# Patient Record
Sex: FEMALE | Race: ASIAN | NOT HISPANIC OR LATINO | Employment: UNEMPLOYED | URBAN - METROPOLITAN AREA
[De-identification: names, ages, dates, MRNs, and addresses within clinical notes are randomized per-mention and may not be internally consistent; named-entity substitution may affect disease eponyms.]

---

## 2017-07-28 ENCOUNTER — HOSPITAL ENCOUNTER (OUTPATIENT)
Dept: MAMMOGRAPHY | Facility: HOSPITAL | Age: 72
Discharge: HOME | End: 2017-07-28

## 2017-07-28 DIAGNOSIS — Z12.31 VISIT FOR SCREENING MAMMOGRAM: ICD-10-CM

## 2017-10-11 ENCOUNTER — COMMUNICATION - HEALTHEAST (OUTPATIENT)
Dept: FAMILY MEDICINE | Facility: CLINIC | Age: 72
End: 2017-10-11

## 2017-11-01 ENCOUNTER — OFFICE VISIT - HEALTHEAST (OUTPATIENT)
Dept: FAMILY MEDICINE | Facility: CLINIC | Age: 72
End: 2017-11-01

## 2017-11-01 DIAGNOSIS — Z23 IMMUNIZATION DUE: ICD-10-CM

## 2017-11-01 DIAGNOSIS — Z23 NEED FOR INFLUENZA VACCINATION: ICD-10-CM

## 2017-11-01 ASSESSMENT — MIFFLIN-ST. JEOR: SCORE: 957.13

## 2021-05-30 ENCOUNTER — RECORDS - HEALTHEAST (OUTPATIENT)
Dept: ADMINISTRATIVE | Facility: CLINIC | Age: 76
End: 2021-05-30

## 2021-05-31 ENCOUNTER — RECORDS - HEALTHEAST (OUTPATIENT)
Dept: ADMINISTRATIVE | Facility: CLINIC | Age: 76
End: 2021-05-31

## 2021-05-31 VITALS — BODY MASS INDEX: 28.33 KG/M2 | HEIGHT: 57 IN | WEIGHT: 131.31 LBS

## 2021-07-03 NOTE — ADDENDUM NOTE
Addendum Note by Belgica Whiteside CMA at 11/1/2017  5:56 PM     Author: Belgica Whiteside CMA Service: -- Author Type: Certified Medical Assistant    Filed: 11/1/2017  5:56 PM Encounter Date: 11/1/2017 Status: Signed    : Belgica Whiteside CMA (Certified Medical Assistant)    Addended by: BELGICA WHITESIDE on: 11/1/2017 05:56 PM        Modules accepted: Orders

## 2021-07-21 ENCOUNTER — RECORDS - HEALTHEAST (OUTPATIENT)
Dept: ADMINISTRATIVE | Facility: CLINIC | Age: 76
End: 2021-07-21

## 2023-12-19 ENCOUNTER — OFFICE VISIT (OUTPATIENT)
Dept: FAMILY MEDICINE | Facility: CLINIC | Age: 78
End: 2023-12-19
Payer: COMMERCIAL

## 2023-12-19 ENCOUNTER — TELEPHONE (OUTPATIENT)
Dept: FAMILY MEDICINE | Facility: CLINIC | Age: 78
End: 2023-12-19

## 2023-12-19 VITALS
RESPIRATION RATE: 21 BRPM | HEIGHT: 59 IN | WEIGHT: 115 LBS | DIASTOLIC BLOOD PRESSURE: 81 MMHG | HEART RATE: 85 BPM | OXYGEN SATURATION: 96 % | SYSTOLIC BLOOD PRESSURE: 129 MMHG | TEMPERATURE: 98.3 F | BODY MASS INDEX: 23.18 KG/M2

## 2023-12-19 DIAGNOSIS — E78.5 HYPERLIPIDEMIA, UNSPECIFIED HYPERLIPIDEMIA TYPE: ICD-10-CM

## 2023-12-19 DIAGNOSIS — E11.69 TYPE 2 DIABETES MELLITUS WITH OTHER SPECIFIED COMPLICATION, WITHOUT LONG-TERM CURRENT USE OF INSULIN (H): Primary | ICD-10-CM

## 2023-12-19 DIAGNOSIS — H04.123 DRY EYES: ICD-10-CM

## 2023-12-19 DIAGNOSIS — Z23 INFLUENZA VACCINATION ADMINISTERED AT CURRENT VISIT: ICD-10-CM

## 2023-12-19 LAB
ALBUMIN SERPL BCG-MCNC: 4.2 G/DL (ref 3.5–5.2)
ALP SERPL-CCNC: 144 U/L (ref 40–150)
ALT SERPL W P-5'-P-CCNC: 19 U/L (ref 0–50)
ANION GAP SERPL CALCULATED.3IONS-SCNC: 13 MMOL/L (ref 7–15)
AST SERPL W P-5'-P-CCNC: 18 U/L (ref 0–45)
BILIRUB SERPL-MCNC: 0.4 MG/DL
BUN SERPL-MCNC: 16.7 MG/DL (ref 8–23)
CALCIUM SERPL-MCNC: 9.4 MG/DL (ref 8.8–10.2)
CHLORIDE SERPL-SCNC: 96 MMOL/L (ref 98–107)
CHOLEST SERPL-MCNC: 334 MG/DL
CREAT SERPL-MCNC: 0.79 MG/DL (ref 0.51–0.95)
CREAT UR-MCNC: 22.2 MG/DL
DEPRECATED HCO3 PLAS-SCNC: 23 MMOL/L (ref 22–29)
EGFRCR SERPLBLD CKD-EPI 2021: 75 ML/MIN/1.73M2
ERYTHROCYTE [DISTWIDTH] IN BLOOD BY AUTOMATED COUNT: 12 % (ref 10–15)
FASTING STATUS PATIENT QL REPORTED: NO
GLUCOSE SERPL-MCNC: 645 MG/DL (ref 70–99)
HCT VFR BLD AUTO: 41.5 % (ref 35–47)
HDLC SERPL-MCNC: 43 MG/DL
HGB BLD-MCNC: 13.5 G/DL (ref 11.7–15.7)
LDLC SERPL CALC-MCNC: 215 MG/DL
MCH RBC QN AUTO: 28.7 PG (ref 26.5–33)
MCHC RBC AUTO-ENTMCNC: 32.5 G/DL (ref 31.5–36.5)
MCV RBC AUTO: 88 FL (ref 78–100)
MICROALBUMIN UR-MCNC: <12 MG/L
MICROALBUMIN/CREAT UR: NORMAL MG/G{CREAT}
NONHDLC SERPL-MCNC: 291 MG/DL
PLATELET # BLD AUTO: 224 10E3/UL (ref 150–450)
POTASSIUM SERPL-SCNC: 4.1 MMOL/L (ref 3.4–5.3)
PROT SERPL-MCNC: 7.9 G/DL (ref 6.4–8.3)
RBC # BLD AUTO: 4.7 10E6/UL (ref 3.8–5.2)
SODIUM SERPL-SCNC: 132 MMOL/L (ref 135–145)
TRIGL SERPL-MCNC: 381 MG/DL
WBC # BLD AUTO: 6 10E3/UL (ref 4–11)

## 2023-12-19 PROCEDURE — 82570 ASSAY OF URINE CREATININE: CPT

## 2023-12-19 PROCEDURE — 90662 IIV NO PRSV INCREASED AG IM: CPT

## 2023-12-19 PROCEDURE — 80061 LIPID PANEL: CPT

## 2023-12-19 PROCEDURE — 36415 COLL VENOUS BLD VENIPUNCTURE: CPT

## 2023-12-19 PROCEDURE — 99207 PR FOOT EXAM NO CHARGE: CPT

## 2023-12-19 PROCEDURE — 82043 UR ALBUMIN QUANTITATIVE: CPT

## 2023-12-19 PROCEDURE — 85027 COMPLETE CBC AUTOMATED: CPT

## 2023-12-19 PROCEDURE — 80053 COMPREHEN METABOLIC PANEL: CPT

## 2023-12-19 PROCEDURE — 99204 OFFICE O/P NEW MOD 45 MIN: CPT | Mod: 25

## 2023-12-19 PROCEDURE — 83036 HEMOGLOBIN GLYCOSYLATED A1C: CPT

## 2023-12-19 PROCEDURE — G0008 ADMIN INFLUENZA VIRUS VAC: HCPCS

## 2023-12-19 RX ORDER — GLIPIZIDE 10 MG/1
10 TABLET, FILM COATED, EXTENDED RELEASE ORAL DAILY
COMMUNITY
Start: 2023-03-09 | End: 2023-12-27

## 2023-12-19 RX ORDER — ATORVASTATIN CALCIUM 20 MG/1
20 TABLET, FILM COATED ORAL DAILY
COMMUNITY
Start: 2023-02-02 | End: 2024-03-04

## 2023-12-19 RX ORDER — GABAPENTIN 100 MG/1
100 CAPSULE ORAL
COMMUNITY
Start: 2023-03-09 | End: 2023-12-27

## 2023-12-19 NOTE — PROGRESS NOTES
Preceptor Attestation:   Patient seen, evaluated and discussed with the resident. I have verified the content of the note, which accurately reflects my assessment of the patient and the plan of care.  Supervising Physician:Maryellen Benedict MD  [facility]

## 2023-12-19 NOTE — PROGRESS NOTES
Assessment & Plan     Type 2 diabetes mellitus with other specified complication, without long-term current use of insulin (H)  Unknown controlled.  Has a blood glucose monitor with her, but states it has not been working properly.  Initially, she was checking her blood sugars twice a day, but again due to her monitor not working has not been of late.  Patient is not having episodes of hypoglycemia.     Medications before this visit: Patient unsure of which medication she is taking, but thinks her current regimen is Metformin 1000 BID, Jardiance 10 mg, Glipizide 10 mg  Medications after this visit: Will continue current regimen.  Will obtain lab work today, and request records from prior primary care.  Discussed that patient should bring all of her medications at follow-up visit, so we can document appropriately.     Diabetes Measures:  A1c <8.0, checked in last year: Unknown last A1c.  Last A1c documented in our chart is from 2014.  Patient's daughter reports she thinks that A1c was checked about 3 months ago.  Statin if indicated: Atorvastatin 20 mg  BP <140/90: At goal today.  On ASA if ischemic vascular disease: no  Tobacco use: no   UTD on annual eye exam: yes, reports seeing eye doctor yearly     - OR FOOT EXAM NO CHARGE  - AMB Adult Diabetes Educator Referral; Future  - Hemoglobin A1c  - Albumin Random Urine Quantitative with Creat Ratio  - CBC with platelets  - Comprehensive metabolic panel    Difficult to obtain clear history from patient, she was unsure of what medication she is taking or when last lab work was done. Will obtain screening lab work today.  In addition, had patient fill out TEMO to get information from prior primary care provider.  Advised to continue taking medications as prescribed for now until we have further information.   - follow up with in 2 weeks      Hyperlipidemia, unspecified hyperlipidemia type  Patient reports history of hyperlipidemia currently on atorvastatin 20 mg.  Reports  "taking this medication without any adverse effects.  Unknown last lab testing.  Will check lipids today.  - Lipid panel reflex to direct LDL Non-fasting    Influenza vaccination administered at current visit  Due for flu shot today.  - INFLUENZA VACCINE 65+ (FLUZONE HD)    Dry eyes  Requesting refill today.   - hypromellose-dextran (ARTIFICAL TEARS) 0.1-0.3 % ophthalmic solution; Place 1-2 drops into both eyes daily as needed for dry eyes       Return in about 2 weeks (around 1/2/2024) for Follow up labs+DM.    Joellen Meeks DO [facility]      Rancho Taylor is a 78 year old, presenting for the following health issues:  Diabetes and Imm/Inj (Flu shot)        12/19/2023     9:42 AM   Additional Questions   Roomed by Lashanda   Accompanied by daughter       HPI   78 year old presents with daughter today to establish care at our clinic.  She reports that she was previously seen by a provider, [facility].     She is here today for follow-up on her diabetes.  She is unsure of her last A1c.  She is unsure of what medication she is taking, but does endorse taking some medications.  She has a blood glucose monitor today in clinic, but states it has not been working.  She reports trying to take her blood sugar twice a day, but again does not know how her monitor is working.      Diabetes Follow-up    How often are you checking your blood sugar? Typically two time daily, but having issues with blood glucose sensor  What concerns do you have today about your diabetes? None and Other: issues with blood glucose sensor   Do you have any of these symptoms? (Select all that apply)  States she feels like \"bugs are crawling on feet\".  No redness, sores or blisters on feet.  No complaints of excessive thirst.  No reports of blurry vision.  No significant changes to weight.          Hyperlipidemia Follow-Up    Are you regularly taking any medication or supplement to lower your cholesterol?  " " Yes- reports taking atorvastatin 20 mg  Are you having muscle aches or other side effects that you think could be caused by your cholesterol lowering medication?  No    Hypertension Follow-up    Do you check your blood pressure regularly outside of the clinic? No   Are you following a low salt diet? No  Are your blood pressures ever more than 140 on the top number (systolic) OR more   than 90 on the bottom number (diastolic), for example 140/90? No    BP Readings from Last 2 Encounters:   12/19/23 129/81     Hemoglobin A1C (%)   Date Value   12/02/2014 6.7 (H)   09/02/2014 6.9 (H)     LDL Cholesterol Direct   Date Value   09/02/2014 159 mg/dl (H)   05/05/2014 131 mg/dL (H)     Also endorsing some ringing in her ears.  She states this has been chronic for many years.      Review of Systems   Constitutional, HEENT, cardiovascular, pulmonary, GI, , musculoskeletal, neuro, skin, endocrine and psych systems are negative, except as otherwise noted.      Objective    /81 (BP Location: Right arm, Patient Position: Sitting, Cuff Size: Adult Regular)   Pulse 85   Temp 98.3  F (36.8  C) (Oral)   Resp 21   Ht 1.49 m (4' 10.66\")   Wt 52.2 kg (115 lb)   SpO2 96%   BMI 23.50 kg/m    Body mass index is 23.5 kg/m .  Physical Exam   GEN: Pleasant female, in no acute distress.   HEENT: Normocephalic, atraumatic.   NECK: Supple. No cervical or supraclavicular adenopathy.   PULM: Non-labored breathing. No use of accessory muscles. Clear to ausculation bilaterally. No wheezes or crackles.   CV: Regular rate and rhythm. Normal S1, S2. No rubs, murmurs, or gallops.    ABDOMEN: Normoactive bowel sounds. Non-tender to palpation. Non-distended.    EXTREMITES:  No clubbing, cyanosis, or edema.    SKIN: No rash on limited skin exam  NEURO:  Awake. Oriented to person, place, time and situation.  Moving all extremities.    PSYCH: Calm. Appropriate affect, insight, judgment.  DM Foot Exam: DP and PT pulses 2+. No cuts or ulcers " present. Toenails hyperkeratotic. Sensation decreased with monofilament testing, but patient with difficulty following commands.      ----- Service Performed and Documented by Resident or Fellow ------

## 2023-12-19 NOTE — PATIENT INSTRUCTIONS
It was nice to meet you today! We discussed your diabetes at today's visit. We obtained some lab work and requested information from your prior primary care provider.     Continue your current regimen until we have further records and lab work.     We would like for you to follow up in 2 weeks where we will review your lab work and follow up on your records.     Please let us know if you have any questions or concerns.

## 2023-12-20 ENCOUNTER — HOSPITAL ENCOUNTER (EMERGENCY)
Facility: HOSPITAL | Age: 78
Discharge: HOME | End: 2023-12-20
Attending: EMERGENCY MEDICINE | Admitting: EMERGENCY MEDICINE
Payer: COMMERCIAL

## 2023-12-20 ENCOUNTER — TELEPHONE (OUTPATIENT)
Dept: FAMILY MEDICINE | Facility: CLINIC | Age: 78
End: 2023-12-20
Payer: COMMERCIAL

## 2023-12-20 VITALS
DIASTOLIC BLOOD PRESSURE: 78 MMHG | SYSTOLIC BLOOD PRESSURE: 128 MMHG | RESPIRATION RATE: 14 BRPM | HEART RATE: 80 BPM | WEIGHT: 113.76 LBS | BODY MASS INDEX: 23.24 KG/M2 | TEMPERATURE: 97.8 F | OXYGEN SATURATION: 96 %

## 2023-12-20 DIAGNOSIS — R73.9 HYPERGLYCEMIA: ICD-10-CM

## 2023-12-20 LAB
ALBUMIN UR-MCNC: NEGATIVE MG/DL
ANION GAP SERPL CALCULATED.3IONS-SCNC: 11 MMOL/L (ref 7–15)
APPEARANCE UR: CLEAR
B-OH-BUTYR SERPL-SCNC: 0.2 MMOL/L
BASE EXCESS BLDV CALC-SCNC: 2.6 MMOL/L
BASOPHILS # BLD AUTO: 0 10E3/UL (ref 0–0.2)
BASOPHILS NFR BLD AUTO: 1 %
BILIRUB UR QL STRIP: NEGATIVE
BUN SERPL-MCNC: 19.7 MG/DL (ref 8–23)
CALCIUM SERPL-MCNC: 9.6 MG/DL (ref 8.8–10.2)
CHLORIDE SERPL-SCNC: 95 MMOL/L (ref 98–107)
COLOR UR AUTO: ABNORMAL
CREAT SERPL-MCNC: 0.85 MG/DL (ref 0.51–0.95)
DEPRECATED HCO3 PLAS-SCNC: 26 MMOL/L (ref 22–29)
EGFRCR SERPLBLD CKD-EPI 2021: 69 ML/MIN/1.73M2
EOSINOPHIL # BLD AUTO: 0.1 10E3/UL (ref 0–0.7)
EOSINOPHIL NFR BLD AUTO: 1 %
ERYTHROCYTE [DISTWIDTH] IN BLOOD BY AUTOMATED COUNT: 12 % (ref 10–15)
GLUCOSE BLDC GLUCOMTR-MCNC: 367 MG/DL (ref 70–99)
GLUCOSE BLDC GLUCOMTR-MCNC: 494 MG/DL (ref 70–99)
GLUCOSE SERPL-MCNC: 502 MG/DL (ref 70–99)
GLUCOSE UR STRIP-MCNC: >1000 MG/DL
HBA1C MFR BLD: >15 % (ref 0–5.6)
HCO3 BLDV-SCNC: 28 MMOL/L (ref 24–30)
HCT VFR BLD AUTO: 40.9 % (ref 35–47)
HGB BLD-MCNC: 13.4 G/DL (ref 11.7–15.7)
HGB UR QL STRIP: NEGATIVE
IMM GRANULOCYTES # BLD: 0 10E3/UL
IMM GRANULOCYTES NFR BLD: 0 %
KETONES UR STRIP-MCNC: NEGATIVE MG/DL
LEUKOCYTE ESTERASE UR QL STRIP: ABNORMAL
LYMPHOCYTES # BLD AUTO: 2.4 10E3/UL (ref 0.8–5.3)
LYMPHOCYTES NFR BLD AUTO: 36 %
MCH RBC QN AUTO: 28.5 PG (ref 26.5–33)
MCHC RBC AUTO-ENTMCNC: 32.8 G/DL (ref 31.5–36.5)
MCV RBC AUTO: 87 FL (ref 78–100)
MONOCYTES # BLD AUTO: 0.4 10E3/UL (ref 0–1.3)
MONOCYTES NFR BLD AUTO: 7 %
MUCOUS THREADS #/AREA URNS LPF: PRESENT /LPF
NEUTROPHILS # BLD AUTO: 3.6 10E3/UL (ref 1.6–8.3)
NEUTROPHILS NFR BLD AUTO: 55 %
NITRATE UR QL: NEGATIVE
NRBC # BLD AUTO: 0 10E3/UL
NRBC BLD AUTO-RTO: 0 /100
OXYHGB MFR BLDV: 57.8 % (ref 70–75)
PCO2 BLDV: 48 MM HG (ref 35–50)
PH BLDV: 7.38 [PH] (ref 7.35–7.45)
PH UR STRIP: 5.5 [PH] (ref 5–7)
PLATELET # BLD AUTO: 220 10E3/UL (ref 150–450)
PO2 BLDV: 30 MM HG (ref 25–47)
POTASSIUM SERPL-SCNC: 4.4 MMOL/L (ref 3.4–5.3)
RBC # BLD AUTO: 4.71 10E6/UL (ref 3.8–5.2)
RBC URINE: 3 /HPF
SAO2 % BLDV: 58.6 % (ref 70–75)
SODIUM SERPL-SCNC: 132 MMOL/L (ref 135–145)
SP GR UR STRIP: 1.03 (ref 1–1.03)
SQUAMOUS EPITHELIAL: 8 /HPF
UROBILINOGEN UR STRIP-MCNC: <2 MG/DL
WBC # BLD AUTO: 6.5 10E3/UL (ref 4–11)
WBC URINE: 1 /HPF

## 2023-12-20 PROCEDURE — 250N000012 HC RX MED GY IP 250 OP 636 PS 637: Performed by: EMERGENCY MEDICINE

## 2023-12-20 PROCEDURE — 81001 URINALYSIS AUTO W/SCOPE: CPT | Performed by: EMERGENCY MEDICINE

## 2023-12-20 PROCEDURE — 87086 URINE CULTURE/COLONY COUNT: CPT | Performed by: EMERGENCY MEDICINE

## 2023-12-20 PROCEDURE — 82805 BLOOD GASES W/O2 SATURATION: CPT | Performed by: EMERGENCY MEDICINE

## 2023-12-20 PROCEDURE — 36415 COLL VENOUS BLD VENIPUNCTURE: CPT | Performed by: EMERGENCY MEDICINE

## 2023-12-20 PROCEDURE — 80048 BASIC METABOLIC PNL TOTAL CA: CPT | Performed by: EMERGENCY MEDICINE

## 2023-12-20 PROCEDURE — 96360 HYDRATION IV INFUSION INIT: CPT

## 2023-12-20 PROCEDURE — 85025 COMPLETE CBC W/AUTO DIFF WBC: CPT | Performed by: EMERGENCY MEDICINE

## 2023-12-20 PROCEDURE — 99284 EMERGENCY DEPT VISIT MOD MDM: CPT | Mod: 25

## 2023-12-20 PROCEDURE — 82962 GLUCOSE BLOOD TEST: CPT

## 2023-12-20 PROCEDURE — 258N000003 HC RX IP 258 OP 636: Performed by: EMERGENCY MEDICINE

## 2023-12-20 PROCEDURE — 82010 KETONE BODYS QUAN: CPT | Performed by: EMERGENCY MEDICINE

## 2023-12-20 RX ADMIN — SODIUM CHLORIDE 1000 ML: 9 INJECTION, SOLUTION INTRAVENOUS at 10:24

## 2023-12-20 RX ADMIN — INSULIN ASPART 8 UNITS: 100 INJECTION, SOLUTION INTRAVENOUS; SUBCUTANEOUS at 12:03

## 2023-12-20 ASSESSMENT — ACTIVITIES OF DAILY LIVING (ADL): ADLS_ACUITY_SCORE: 35

## 2023-12-20 NOTE — ED TRIAGE NOTES
Pt presents to triage ambulatory from home. Pt was seen in clinic yesterday and had labs drawn. Pt received a call today that her BG was elevated and she should report to the ER. Pt takes metformin two times daily and did take it this morning. Pt is also reporting headache, bilateral knee pain, right shoulder pain, and generalized muscle aches. She believes the shoulder pain is related to an injection yesterday.     Triage Assessment (Adult)       Row Name 12/20/23 1011          Triage Assessment    Airway WDL WDL        Respiratory WDL    Respiratory WDL WDL        Skin Circulation/Temperature WDL    Skin Circulation/Temperature WDL WDL        Cardiac WDL    Cardiac WDL WDL        Peripheral/Neurovascular WDL    Peripheral Neurovascular WDL WDL        Cognitive/Neuro/Behavioral WDL    Cognitive/Neuro/Behavioral WDL WDL

## 2023-12-20 NOTE — ED PROVIDER NOTES
EMERGENCY DEPARTMENT ENCOUNTER     NAME: Claudia May   AGE: 78 year old female   YOB: 1945   MRN: [id number]   EVALUATION DATE & TIME: No admission date for patient encounter.   PCP: Stacey Morgan     Chief Complaint   Patient presents with    Hyperglycemia   :    FINAL IMPRESSION       1. Hyperglycemia           ED COURSE & MEDICAL DECISION MAKING      Pertinent Labs & Imaging studies reviewed. (See chart for details)   78 year old female  presents to the Emergency Department for evaluation of elevated blood glucose that was discovered on a CMP when she went in to establish care at the [facility]. Initial Vitals Reviewed. Initial exam notable for well-appearing patient who has normal vitals and is asymptomatic.  Blood glucose here was just shy of 500.  Labs were initiated to rule out DKA and are fortunately negative.  Her blood sugar is around 500, and with a small amount of subcutaneous insulin and fluids improved into the 300s.  Given that she is asymptomatic, I do not think this requires inpatient management.  It looks like she was on glipizide and metformin at least as of the chart records I can see some going to encourage her to continue her diabetes medicines and follow-up with family clinic for medication adjustments and teaching.           At the conclusion of the encounter I discussed the results of all of the tests and the disposition. The questions were answered. The patient or family acknowledged understanding and was agreeable with the care plan.     0 minutes critical care time, see procedure note below for details if relevant    Medical Decision Making    History:  Supplemental history from: Family Member/Significant Other  External Record(s) reviewed: Outpatient Record: Telephone encounter with [facility] from 12/20/2023    Work Up:  Chart documentation includes differential considered and any EKGs or imaging independently interpreted by provider, where specified.  In  additional to work up documented, I considered the following work up: Documented in chart, if applicable.    External consultation:  Discussion of management with another provider: Documented in chart, if applicable    Complicating factors:  Care impacted by chronic illness: Diabetes  Care affected by social determinants of health: Access to Medical Care    Disposition considerations: Discharge. I recommended the patient continue their current prescription strength medication(s): metformin and glipizide. I considered admission, but ultimately discharged patient with reassuring workup and clinical improvement.        MEDICATIONS GIVEN IN THE EMERGENCY:   Medications   sodium chloride 0.9% BOLUS 1,000 mL (has no administration in time range)      NEW PRESCRIPTIONS STARTED AT TODAY'S ER VISIT   New Prescriptions    No medications on file     ================================================================   HISTORY OF PRESENT ILLNESS       Patient information was obtained from: Patient and family  Use of Intrepreter:  Yes  Phone  - Language Leanne Mckeon is a 78 year old female with history of DM who presents as a referral from clinic.  Patient reportedly had a CMP drawn and a visit to establish care.  She has a history of diabetes but was not sure about what medication she is on.  She was otherwise asymptomatic, but blood sugar resulted at 645, so she was referred to the ED for evaluation.  She is asymptomatic currently.    ================================================================        PAST HISTORY     PAST MEDICAL HISTORY:   No past medical history on file.   PAST SURGICAL HISTORY:   Past Surgical History:   Procedure Laterality Date    BREAST CYST ASPIRATION Left     left breast abcess drained      CURRENT MEDICATIONS:   acetaminophen (TYLENOL) 500 MG tablet  aspirin 81 MG EC tablet  atorvastatin (LIPITOR) 20 MG tablet  blood sugar diagnostic (ONE TOUCH ULTRA TEST) Strp  cholecalciferol, vitamin D3,  (VITAMIN D3) 2,000 unit cap  empagliflozin (JARDIANCE) 10 MG TABS tablet  ergocalciferol (ERGOCALCIFEROL) 50,000 unit capsule  FLUoxetine (PROZAC) 20 MG capsule  gabapentin (NEURONTIN) 100 MG capsule  glipiZIDE (GLUCOTROL XL) 10 MG 24 hr tablet  hypromellose-dextran (ARTIFICAL TEARS) 0.1-0.3 % ophthalmic solution  lisinopril (PRINIVIL,ZESTRIL) 5 MG tablet  MEDICATION CANNOT BE REORDERED - PLEASE MANUALLY REORDER AND DISCONTINUE THE OLD ORDER  metFORMIN (GLUCOPHAGE) 500 MG tablet  methyl salicylate-menthol (ICY HOT) 30-10 % Crea  METHYL SALICYLATE/MENTHOL (ICY HOT EXTRA STRENGTH TOP)  peg 400-propylene glycol PF (SYSTANE ULTRA, PF,) 0.4-0.3 % Dpet      ALLERGIES:   Allergies   Allergen Reactions    Ibuprofen Rash      FAMILY HISTORY:   No family history on file.   SOCIAL HISTORY:   Social History     Socioeconomic History    Marital status:    Tobacco Use    Smoking status: Never     Passive exposure: Never    Smokeless tobacco: Never   Social History Narrative    Steven     Social Determinants of Health     Financial Resource Strain: Low Risk  (12/19/2023)    Financial Resource Strain     Within the past 12 months, have you or your family members you live with been unable to get utilities (heat, electricity) when it was really needed?: No   Food Insecurity: Low Risk  (12/19/2023)    Food Insecurity     Within the past 12 months, did you worry that your food would run out before you got money to buy more?: No     Within the past 12 months, did the food you bought just not last and you didn t have money to get more?: No   Transportation Needs: Low Risk  (12/19/2023)    Transportation Needs     Within the past 12 months, has lack of transportation kept you from medical appointments, getting your medicines, non-medical meetings or appointments, work, or from getting things that you need?: No   Interpersonal Safety: Low Risk  (12/19/2023)    Interpersonal Safety     Do you feel physically and emotionally safe where  you currently live?: Yes     Within the past 12 months, have you been hit, slapped, kicked or otherwise physically hurt by someone?: No     Within the past 12 months, have you been humiliated or emotionally abused in other ways by your partner or ex-partner?: No   Housing Stability: Low Risk  (12/19/2023)    Housing Stability     Do you have housing? : Yes     Are you worried about losing your housing?: No        VITALS  Patient Vitals for the past 24 hrs:   Weight   12/20/23 1014 51.6 kg (113 lb 12.1 oz)        ================================================================    PHYSICAL EXAM     VITAL SIGNS: Wt 51.6 kg (113 lb 12.1 oz)   BMI 23.24 kg/m     Constitutional:  Awake, no acute distress   HENT:  Atraumatic, oropharynx without exudate or erythema, membranes moist  Lymph:  No adenopathy  Eyes: EOM intact, PERRL, no injection  Neck: Supple  Respiratory:  Clear to auscultation bilaterally, no wheezes or crackles   Cardiovascular:  Regular rate and rhythm, single S1 and S2   GI:  Soft, nontender, nondistended, no rebound or guarding   Musculoskeletal:  Moves all extremities, no lower extremity edema, no deformities    Skin:  Warm, dry  Neurologic:  Alert and oriented x3, no focal deficits noted       ================================================================  LAB       All pertinent labs reviewed and interpreted.   Labs Ordered and Resulted from Time of ED Arrival to Time of ED Departure   BASIC METABOLIC PANEL - Abnormal       Result Value    Sodium 132 (*)     Potassium 4.4      Chloride 95 (*)     Carbon Dioxide (CO2) 26      Anion Gap 11      Urea Nitrogen 19.7      Creatinine 0.85      GFR Estimate 69      Calcium 9.6      Glucose 502 (*)    BLOOD GAS VENOUS - Abnormal    pH Venous 7.38      pCO2 Venous 48      pO2 Venous 30      Bicarbonate Venous 28      Base Excess/Deficit 2.6      Oxyhemoglobin Venous 57.8 (*)     O2 Sat, Venous 58.6 (*)    ROUTINE UA WITH MICROSCOPIC REFLEX TO CULTURE -  Abnormal    Color Urine Light Yellow      Appearance Urine Clear      Glucose Urine >1000 (*)     Bilirubin Urine Negative      Ketones Urine Negative      Specific Gravity Urine 1.029      Blood Urine Negative      pH Urine 5.5      Protein Albumin Urine Negative      Urobilinogen Urine <2.0      Nitrite Urine Negative      Leukocyte Esterase Urine 250 Cris/uL (*)     Mucus Urine Present (*)     RBC Urine 3 (*)     WBC Urine 1      Squamous Epithelials Urine 8 (*)    GLUCOSE BY METER - Abnormal    GLUCOSE BY METER POCT 494 (*)    GLUCOSE BY METER - Abnormal    GLUCOSE BY METER POCT 367 (*)    KETONE BETA-HYDROXYBUTYRATE QUANTITATIVE, RAPID - Normal    Ketone (Beta-Hydroxybutyrate) Quantitative 0.20     GLUCOSE MONITOR NURSING POCT   CBC WITH PLATELETS AND DIFFERENTIAL    WBC Count 6.5      RBC Count 4.71      Hemoglobin 13.4      Hematocrit 40.9      MCV 87      MCH 28.5      MCHC 32.8      RDW 12.0      Platelet Count 220      % Neutrophils 55      % Lymphocytes 36      % Monocytes 7      % Eosinophils 1      % Basophils 1      % Immature Granulocytes 0      NRBCs per 100 WBC 0      Absolute Neutrophils 3.6      Absolute Lymphocytes 2.4      Absolute Monocytes 0.4      Absolute Eosinophils 0.1      Absolute Basophils 0.0      Absolute Immature Granulocytes 0.0      Absolute NRBCs 0.0     URINE CULTURE        ===============================================================  RADIOLOGY       Reviewed all pertinent imaging. Please see official radiology report.   No orders to display         ================================================================  EKG         I have independently reviewed and interpreted the EKG(s) documented above.     ================================================================  PROCEDURES           Francia Seymour M.D.   Emergency Medicine   [facility] EMERGENCY DEPARTMENT  [address] [place], [place] [id number] [phone]  Dept:  [phone]        Francia Seymour MD  12/20/23 2421

## 2023-12-20 NOTE — TELEPHONE ENCOUNTER
Returning message about critical lab value drawn in clinic yesterday 12/19. Pt is an 79 yo F who presented to clinic to establish care, reportedly w/ pmh of type 2 DM. Pt was unclear about what medications she takes for this, believes these may include metformin/glipizide but she was unsure and did not have her medications w/ her. CMP drawn at that time returned showing BG elevated to 645. Na low at 130 though w/ correction for serum glucose this is WNL. She did not have an elevated anion gap at that time.     Called pt via Leanne tele - interpretor this AM. Spoke w/ her daughter who states pt continues to feel quite unwell w/ fatigue and body aches. Denies significant nausea or vomiting at this time though appetite is poor. Given pt is 78 years old and not well established in clinic at this time, w/ sugars elevated >600 I do believe she should be evaluated more urgently. Recommended to pt daughter that she bring her mother in to the ER for further evaluation. She is comfortable w/ this plan and plans to bring her mother to [facility] ER after dropping her daughters off at school this AM. All questions answered at this time and pt's daughter feels comfortable w/ plan to self transport to ED via private vehicle.

## 2023-12-20 NOTE — RESULT ENCOUNTER NOTE
Ordering MD will be in this afternoon, per clinic abnormal result protocol, will route to Urgent Task Physician, Dr Medley to review and further advise. Thank you for your help. Dionne ANDREA

## 2023-12-20 NOTE — DISCHARGE INSTRUCTIONS
Make sure to take your diabetes medicines and make an appointment to follow-up with the [facility] clinic to discuss changes.

## 2023-12-20 NOTE — TELEPHONE ENCOUNTER
6:59 PM    Returned page from lab for critical lab value.   Blood glucose from [facility] BMP in clinic 645.     Called the patient with Leanne interpretor.   There was no answer. The interpretor left a voicemail. I will try to call back again later tonight.     Steve Jacques MD

## 2023-12-21 ENCOUNTER — PATIENT OUTREACH (OUTPATIENT)
Dept: CARE COORDINATION | Facility: CLINIC | Age: 78
End: 2023-12-21
Payer: COMMERCIAL

## 2023-12-21 LAB — BACTERIA UR CULT: NORMAL

## 2023-12-21 NOTE — PROGRESS NOTES
Clinic Care Coordination Contact  Follow Up Progress Note      Assessment: The pt was recently in the ED, I called to check up on the pt, and help the pt setup a ED follow up. The pt was at [facility] for hyperglycemia. I called and talked to the pt daughter, she stated that the pt doing better. Pt daughter did want to make a follow up, so I was able to setup for the pt to come in on 12/27/2023 at 9:00am with .    Care Gaps:    Health Maintenance Due   Topic Date Due    DEXA  Never done    ADVANCE CARE PLANNING  Never done    EYE EXAM  Never done    RSV VACCINE (Pregnancy & 60+) (1 - 1-dose 60+ series) Never done    MEDICARE ANNUAL WELLNESS VISIT  Never done    FALL RISK ASSESSMENT  Never done    ZOSTER IMMUNIZATION (2 of 3) 03/31/2008    COVID-19 Vaccine (4 - 2023-24 season) 09/01/2023           Care Plans      Intervention/Education provided during outreach:               Plan:     Care Coordinator will follow up in

## 2023-12-26 NOTE — PROGRESS NOTES
Assessment & Plan     Fall down stairs, initial encounter  Pt reportedly suffered mechanical fall last evening while attempting to walk down the stairs in her home. Pt's legs gave out from under her and she fell backwards onto her bottom. Did not hit her head or lose consciousness. Only blood thinning medication is ASA. Today in clinic presented w/ 10/10 R hip and knee px, significant bruising to the posterior thigh on the R. XRs ordered today for the lumbar spine, R hip, and R knee notable for obvious displaced fracture of the R femoral neck. At baseline pt ambulates w/ a walker at home, has been unable to walk since her fall d/t 10/10 px. Pt daughter does not feel comfortable transferring pt in and out of private vehicle to transport to ED and so EMS were called from clinic to bring pt to [facility] ED.   - XR Hip Right 2-3 Views  - XR Knee Right 1/2 Views  - XR Lumbar Spine 1 View  - Pt transported to [facility] ED via EMS    Type II diabetes mellitus with peripheral circulatory disorder (H)  A1c recently measured >15 12/19, current regimen includes jardiance and glipizide. Seen in the ED at [facility] recently 12/20 for BGs >600, found to not be in DKA, given insulin and fluids w/ improvement in this and discharged home. Today reporting home BGs which she checks twice a day have remained very elevated, typically between 400 and 600. Somewhat limited medical literacy, both pt and her daughter who is primary caretaker do not know anything about insulin. Plan was to initiate basal insulin w/ 10u lantus at bedtime today, however pt needing acute hospitalization per above. Will hold off on sending these today as pt was unable to get insulin teaching done w/ acute hip fracture. Will ultimately likely need RN visit for insulin teaching at minimum as well as covisit w/ pharmacy in the future to reinforce this and give further teaching.  - Plan to stop glipizide, start lantus 10u at bedtime once pt able to receive further  education, will hold off on ordering these today  - MTM referral placed today  - Refilling blood glucose testing lancets and test strips today    No follow-ups on file.    Mateus Medley MD  [facility] [place] Subjective Ree is a 78 year old, presenting for the following health issues:  e/r follow up  ([facility] due to high bp and yesterday pt fell at home causing  bruising on the Rt side of the leg. Fell down the stair )    HPI     ED follow up  77 yo F w/ pmh of dementia, DM II, GERD,   - Seen at [facility] ED 12/20 for hyperglycemia after routine metabolic panel in clinic returned showing BG of 645  - A1c >15 at last clinic visit 12/19  - Was not found to be in DKA, BGs improved w/ fluids and small amount of insulin  - Current DM regimen includes jardiance 10 mg and glipizide 10 mg daily  - Checks BGs at home once or twice a day  -  this AM, reports these have al been high  - Lowest number she has seen is still >200, highest has been 557  - Needs refills of of BG supplies    Fall  - Fell while walking down stairs earlier this week  - Landed on her back side, bruising on her thighs from this   - Did not hit her head, no LOC  - Has a walker at home, does not like as she is so unsteady on her feet and this tips over with her  - Requesting wheelchair order today  - R hip and knee pxful since that time. 10/10 px here  - Cannot sleep at night 2/2 this pain  - Has tried tylenol and and hot compress so far w/o significant relief    Review of Systems   Constitutional, HEENT, cardiovascular, pulmonary, gi and gu systems are negative, except as otherwise noted.      Objective    /74   Pulse 96   SpO2 98%   There is no height or weight on file to calculate BMI.    Physical Exam   GENERAL: healthy, alert, appears stated age  HEENT: Normocephalic, atraumatic, dry mucous membranes  NECK: no adenopathy, no asymmetry, masses, or scars and thyroid normal to palpation  RESP: lungs  clear to auscultation - no rales, rhonchi or wheezes  CV: regular rate and rhythm, normal S1 S2, no S3 or S4, no murmur, click or rub, no peripheral edema and peripheral pulses strong  ABDOMEN: soft, nontender, no hepatosplenomegaly, no masses and bowel sounds normal  MS: Significant ttp over R hip superior ileac crest, greater trochanter, mild ttp over femoral body, normal R knee exam    ----- Service Performed and Documented by Resident or Fellow ------

## 2023-12-27 ENCOUNTER — APPOINTMENT (OUTPATIENT)
Dept: RADIOLOGY | Facility: HOSPITAL | Age: 78
DRG: 481 | End: 2023-12-27
Attending: STUDENT IN AN ORGANIZED HEALTH CARE EDUCATION/TRAINING PROGRAM
Payer: COMMERCIAL

## 2023-12-27 ENCOUNTER — ANCILLARY PROCEDURE (OUTPATIENT)
Dept: GENERAL RADIOLOGY | Facility: CLINIC | Age: 78
End: 2023-12-27
Attending: FAMILY MEDICINE
Payer: COMMERCIAL

## 2023-12-27 ENCOUNTER — APPOINTMENT (OUTPATIENT)
Dept: RADIOLOGY | Facility: HOSPITAL | Age: 78
DRG: 481 | End: 2023-12-27
Attending: EMERGENCY MEDICINE
Payer: COMMERCIAL

## 2023-12-27 ENCOUNTER — ANESTHESIA EVENT (OUTPATIENT)
Dept: SURGERY | Facility: HOSPITAL | Age: 78
DRG: 481 | End: 2023-12-27
Payer: COMMERCIAL

## 2023-12-27 ENCOUNTER — HOSPITAL ENCOUNTER (INPATIENT)
Facility: HOSPITAL | Age: 78
LOS: 6 days | Discharge: NURSING, REHAB OR LONG-TERM CARE | DRG: 481 | End: 2024-01-02
Attending: EMERGENCY MEDICINE | Admitting: FAMILY MEDICINE
Payer: COMMERCIAL

## 2023-12-27 ENCOUNTER — OFFICE VISIT (OUTPATIENT)
Dept: FAMILY MEDICINE | Facility: CLINIC | Age: 78
End: 2023-12-27
Payer: COMMERCIAL

## 2023-12-27 ENCOUNTER — ANESTHESIA (OUTPATIENT)
Dept: SURGERY | Facility: HOSPITAL | Age: 78
DRG: 481 | End: 2023-12-27
Payer: COMMERCIAL

## 2023-12-27 ENCOUNTER — APPOINTMENT (OUTPATIENT)
Dept: CT IMAGING | Facility: HOSPITAL | Age: 78
DRG: 481 | End: 2023-12-27
Attending: EMERGENCY MEDICINE
Payer: COMMERCIAL

## 2023-12-27 VITALS — HEART RATE: 96 BPM | DIASTOLIC BLOOD PRESSURE: 74 MMHG | SYSTOLIC BLOOD PRESSURE: 118 MMHG | OXYGEN SATURATION: 98 %

## 2023-12-27 DIAGNOSIS — E11.51 TYPE II DIABETES MELLITUS WITH PERIPHERAL CIRCULATORY DISORDER (H): ICD-10-CM

## 2023-12-27 DIAGNOSIS — E11.65 TYPE 2 DIABETES MELLITUS WITH HYPERGLYCEMIA, UNSPECIFIED WHETHER LONG TERM INSULIN USE (H): Primary | ICD-10-CM
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
## 2023-12-27 DIAGNOSIS — S72.001A HIP FRACTURE, RIGHT, CLOSED, INITIAL ENCOUNTER (H): ICD-10-CM

## 2023-12-27 LAB
ABO/RH(D): NORMAL
ANION GAP SERPL CALCULATED.3IONS-SCNC: 14 MMOL/L (ref 7–15)
ANTIBODY SCREEN: NEGATIVE
APTT PPP: 24 SECONDS (ref 22–38)
BASOPHILS # BLD AUTO: 0.1 10E3/UL (ref 0–0.2)
BASOPHILS NFR BLD AUTO: 1 %
BLD PROD TYP BPU: NORMAL
BLOOD COMPONENT TYPE: NORMAL
BUN SERPL-MCNC: 18.8 MG/DL (ref 8–23)
CALCIUM SERPL-MCNC: 8.7 MG/DL (ref 8.8–10.2)
CHLORIDE SERPL-SCNC: 98 MMOL/L (ref 98–107)
CODING SYSTEM: NORMAL
CREAT SERPL-MCNC: 0.77 MG/DL (ref 0.51–0.95)
CROSSMATCH: NORMAL
DEPRECATED HCO3 PLAS-SCNC: 22 MMOL/L (ref 22–29)
EGFRCR SERPLBLD CKD-EPI 2021: 78 ML/MIN/1.73M2
EOSINOPHIL # BLD AUTO: 0.1 10E3/UL (ref 0–0.7)
EOSINOPHIL NFR BLD AUTO: 1 %
ERYTHROCYTE [DISTWIDTH] IN BLOOD BY AUTOMATED COUNT: 12.5 % (ref 10–15)
ETHANOL SERPL-MCNC: <0.01 G/DL
FLUAV RNA SPEC QL NAA+PROBE: NEGATIVE
FLUBV RNA RESP QL NAA+PROBE: NEGATIVE
GLUCOSE BLDC GLUCOMTR-MCNC: 237 MG/DL (ref 70–99)
GLUCOSE BLDC GLUCOMTR-MCNC: 291 MG/DL (ref 70–99)
GLUCOSE BLDC GLUCOMTR-MCNC: 306 MG/DL (ref 70–99)
GLUCOSE SERPL-MCNC: 339 MG/DL (ref 70–99)
HCT VFR BLD AUTO: 27.9 % (ref 35–47)
HGB BLD-MCNC: 9.3 G/DL (ref 11.7–15.7)
IMM GRANULOCYTES # BLD: 0.1 10E3/UL
IMM GRANULOCYTES NFR BLD: 1 %
INR PPP: 1.06 (ref 0.85–1.15)
INR PPP: NORMAL
ISSUE DATE AND TIME: NORMAL
LACTATE SERPL-SCNC: 1.4 MMOL/L (ref 0.7–2)
LYMPHOCYTES # BLD AUTO: 3.4 10E3/UL (ref 0.8–5.3)
LYMPHOCYTES NFR BLD AUTO: 33 %
MCH RBC QN AUTO: 28.9 PG (ref 26.5–33)
MCHC RBC AUTO-ENTMCNC: 33.3 G/DL (ref 31.5–36.5)
MCV RBC AUTO: 87 FL (ref 78–100)
MONOCYTES # BLD AUTO: 0.9 10E3/UL (ref 0–1.3)
MONOCYTES NFR BLD AUTO: 9 %
NEUTROPHILS # BLD AUTO: 6 10E3/UL (ref 1.6–8.3)
NEUTROPHILS NFR BLD AUTO: 55 %
NRBC # BLD AUTO: 0 10E3/UL
NRBC BLD AUTO-RTO: 0 /100
PLATELET # BLD AUTO: ABNORMAL 10*3/UL
POTASSIUM SERPL-SCNC: 4.5 MMOL/L (ref 3.4–5.3)
PROCALCITONIN SERPL IA-MCNC: 0.24 NG/ML
RBC # BLD AUTO: 3.22 10E6/UL (ref 3.8–5.2)
RSV RNA SPEC NAA+PROBE: NEGATIVE
SARS-COV-2 RNA RESP QL NAA+PROBE: NEGATIVE
SODIUM SERPL-SCNC: 134 MMOL/L (ref 135–145)
SPECIMEN EXPIRATION DATE: NORMAL
UNIT ABO/RH: NORMAL
UNIT NUMBER: NORMAL
UNIT STATUS: NORMAL
UNIT TYPE ISBT: 7300
WBC # BLD AUTO: 10.5 10E3/UL (ref 4–11)

## 2023-12-27 PROCEDURE — 72170 X-RAY EXAM OF PELVIS: CPT

## 2023-12-27 PROCEDURE — 80048 BASIC METABOLIC PNL TOTAL CA: CPT | Performed by: EMERGENCY MEDICINE

## 2023-12-27 PROCEDURE — 250N000012 HC RX MED GY IP 250 OP 636 PS 637

## 2023-12-27 PROCEDURE — 96374 THER/PROPH/DIAG INJ IV PUSH: CPT

## 2023-12-27 PROCEDURE — 85041 AUTOMATED RBC COUNT: CPT | Performed by: EMERGENCY MEDICINE

## 2023-12-27 PROCEDURE — 85610 PROTHROMBIN TIME: CPT | Performed by: EMERGENCY MEDICINE

## 2023-12-27 PROCEDURE — 73560 X-RAY EXAM OF KNEE 1 OR 2: CPT | Mod: RT

## 2023-12-27 PROCEDURE — 999N000065 XR FEMUR PORT RIGHT 2 VIEWS: Mod: RT

## 2023-12-27 PROCEDURE — 272N000001 HC OR GENERAL SUPPLY STERILE: Performed by: STUDENT IN AN ORGANIZED HEALTH CARE EDUCATION/TRAINING PROGRAM

## 2023-12-27 PROCEDURE — 93005 ELECTROCARDIOGRAM TRACING: CPT | Performed by: EMERGENCY MEDICINE

## 2023-12-27 PROCEDURE — 84145 PROCALCITONIN (PCT): CPT | Performed by: EMERGENCY MEDICINE

## 2023-12-27 PROCEDURE — 250N000011 HC RX IP 250 OP 636: Performed by: NURSE ANESTHETIST, CERTIFIED REGISTERED

## 2023-12-27 PROCEDURE — 99223 1ST HOSP IP/OBS HIGH 75: CPT | Mod: AI | Performed by: FAMILY MEDICINE

## 2023-12-27 PROCEDURE — 360N000084 HC SURGERY LEVEL 4 W/ FLUORO, PER MIN: Performed by: STUDENT IN AN ORGANIZED HEALTH CARE EDUCATION/TRAINING PROGRAM

## 2023-12-27 PROCEDURE — 258N000003 HC RX IP 258 OP 636: Performed by: NURSE ANESTHETIST, CERTIFIED REGISTERED

## 2023-12-27 PROCEDURE — 85730 THROMBOPLASTIN TIME PARTIAL: CPT | Performed by: EMERGENCY MEDICINE

## 2023-12-27 PROCEDURE — 258N000003 HC RX IP 258 OP 636: Performed by: ANESTHESIOLOGY

## 2023-12-27 PROCEDURE — 250N000011 HC RX IP 250 OP 636: Performed by: ANESTHESIOLOGY

## 2023-12-27 PROCEDURE — 73560 X-RAY EXAM OF KNEE 1 OR 2: CPT | Mod: TC | Performed by: RADIOLOGY

## 2023-12-27 PROCEDURE — 99215 OFFICE O/P EST HI 40 MIN: CPT | Mod: GC

## 2023-12-27 PROCEDURE — 86923 COMPATIBILITY TEST ELECTRIC: CPT | Performed by: ANESTHESIOLOGY

## 2023-12-27 PROCEDURE — 73502 X-RAY EXAM HIP UNI 2-3 VIEWS: CPT | Mod: TC | Performed by: RADIOLOGY

## 2023-12-27 PROCEDURE — 99285 EMERGENCY DEPT VISIT HI MDM: CPT | Mod: 25

## 2023-12-27 PROCEDURE — 87637 SARSCOV2&INF A&B&RSV AMP PRB: CPT | Performed by: EMERGENCY MEDICINE

## 2023-12-27 PROCEDURE — 71045 X-RAY EXAM CHEST 1 VIEW: CPT

## 2023-12-27 PROCEDURE — 86900 BLOOD TYPING SEROLOGIC ABO: CPT | Performed by: EMERGENCY MEDICINE

## 2023-12-27 PROCEDURE — 710N000009 HC RECOVERY PHASE 1, LEVEL 1, PER MIN: Performed by: STUDENT IN AN ORGANIZED HEALTH CARE EDUCATION/TRAINING PROGRAM

## 2023-12-27 PROCEDURE — 370N000017 HC ANESTHESIA TECHNICAL FEE, PER MIN: Performed by: STUDENT IN AN ORGANIZED HEALTH CARE EDUCATION/TRAINING PROGRAM

## 2023-12-27 PROCEDURE — P9016 RBC LEUKOCYTES REDUCED: HCPCS | Performed by: ANESTHESIOLOGY

## 2023-12-27 PROCEDURE — 250N000011 HC RX IP 250 OP 636: Performed by: STUDENT IN AN ORGANIZED HEALTH CARE EDUCATION/TRAINING PROGRAM

## 2023-12-27 PROCEDURE — 999N000141 HC STATISTIC PRE-PROCEDURE NURSING ASSESSMENT: Performed by: STUDENT IN AN ORGANIZED HEALTH CARE EDUCATION/TRAINING PROGRAM

## 2023-12-27 PROCEDURE — 250N000009 HC RX 250: Performed by: ANESTHESIOLOGY

## 2023-12-27 PROCEDURE — C1713 ANCHOR/SCREW BN/BN,TIS/BN: HCPCS | Performed by: STUDENT IN AN ORGANIZED HEALTH CARE EDUCATION/TRAINING PROGRAM

## 2023-12-27 PROCEDURE — 72125 CT NECK SPINE W/O DYE: CPT

## 2023-12-27 PROCEDURE — 73552 X-RAY EXAM OF FEMUR 2/>: CPT | Mod: RT

## 2023-12-27 PROCEDURE — 83605 ASSAY OF LACTIC ACID: CPT | Performed by: EMERGENCY MEDICINE

## 2023-12-27 PROCEDURE — 120N000001 HC R&B MED SURG/OB

## 2023-12-27 PROCEDURE — 82077 ASSAY SPEC XCP UR&BREATH IA: CPT | Performed by: EMERGENCY MEDICINE

## 2023-12-27 PROCEDURE — 250N000009 HC RX 250: Performed by: NURSE ANESTHETIST, CERTIFIED REGISTERED

## 2023-12-27 PROCEDURE — 72020 X-RAY EXAM OF SPINE 1 VIEW: CPT | Mod: TC | Performed by: RADIOLOGY

## 2023-12-27 PROCEDURE — 250N000011 HC RX IP 250 OP 636: Performed by: EMERGENCY MEDICINE

## 2023-12-27 PROCEDURE — 0QS636Z REPOSITION RIGHT UPPER FEMUR WITH INTRAMEDULLARY INTERNAL FIXATION DEVICE, PERCUTANEOUS APPROACH: ICD-10-PCS | Performed by: STUDENT IN AN ORGANIZED HEALTH CARE EDUCATION/TRAINING PROGRAM

## 2023-12-27 PROCEDURE — 258N000003 HC RX IP 258 OP 636: Performed by: EMERGENCY MEDICINE

## 2023-12-27 PROCEDURE — 999N000065 XR PELVIS PORT 1/2 VIEWS

## 2023-12-27 PROCEDURE — 250N000013 HC RX MED GY IP 250 OP 250 PS 637: Performed by: STUDENT IN AN ORGANIZED HEALTH CARE EDUCATION/TRAINING PROGRAM

## 2023-12-27 PROCEDURE — 96361 HYDRATE IV INFUSION ADD-ON: CPT

## 2023-12-27 PROCEDURE — 36415 COLL VENOUS BLD VENIPUNCTURE: CPT | Performed by: EMERGENCY MEDICINE

## 2023-12-27 PROCEDURE — 70450 CT HEAD/BRAIN W/O DYE: CPT

## 2023-12-27 PROCEDURE — 999N000180 XR SURGERY CARM FLUORO LESS THAN 5 MIN

## 2023-12-27 DEVICE — LAG SCREW, TI
Type: IMPLANTABLE DEVICE | Site: HIP | Status: FUNCTIONAL
Brand: GAMMA

## 2023-12-27 DEVICE — LOCKING SCREW, FULLY THREADED: Type: IMPLANTABLE DEVICE | Site: FEMUR | Status: FUNCTIONAL

## 2023-12-27 DEVICE — K-WIRE: Type: IMPLANTABLE DEVICE | Site: HIP | Status: FUNCTIONAL

## 2023-12-27 DEVICE — IMPLANTABLE DEVICE: Type: IMPLANTABLE DEVICE | Site: HIP | Status: FUNCTIONAL

## 2023-12-27 RX ORDER — ONDANSETRON 4 MG/1
4 TABLET, ORALLY DISINTEGRATING ORAL EVERY 30 MIN PRN
Status: DISCONTINUED | OUTPATIENT
Start: 2023-12-27 | End: 2023-12-27 | Stop reason: HOSPADM

## 2023-12-27 RX ORDER — LIDOCAINE 40 MG/G
CREAM TOPICAL
Status: DISCONTINUED | OUTPATIENT
Start: 2023-12-27 | End: 2024-01-02 | Stop reason: HOSPADM

## 2023-12-27 RX ORDER — AMOXICILLIN 250 MG
2 CAPSULE ORAL 2 TIMES DAILY PRN
Status: DISCONTINUED | OUTPATIENT
Start: 2023-12-27 | End: 2024-01-02 | Stop reason: HOSPADM

## 2023-12-27 RX ORDER — HYDROMORPHONE HYDROCHLORIDE 1 MG/ML
0.2 INJECTION, SOLUTION INTRAMUSCULAR; INTRAVENOUS; SUBCUTANEOUS
Status: DISCONTINUED | OUTPATIENT
Start: 2023-12-27 | End: 2023-12-27

## 2023-12-27 RX ORDER — BUPIVACAINE HYDROCHLORIDE 5 MG/ML
INJECTION, SOLUTION EPIDURAL; INTRACAUDAL
Status: COMPLETED | OUTPATIENT
Start: 2023-12-27 | End: 2023-12-27

## 2023-12-27 RX ORDER — NICOTINE POLACRILEX 4 MG
15-30 LOZENGE BUCCAL
Status: DISCONTINUED | OUTPATIENT
Start: 2023-12-27 | End: 2024-01-02 | Stop reason: HOSPADM

## 2023-12-27 RX ORDER — ONDANSETRON 2 MG/ML
4 INJECTION INTRAMUSCULAR; INTRAVENOUS EVERY 6 HOURS PRN
Status: DISCONTINUED | OUTPATIENT
Start: 2023-12-27 | End: 2023-12-27

## 2023-12-27 RX ORDER — PROCHLORPERAZINE MALEATE 5 MG
5 TABLET ORAL EVERY 6 HOURS PRN
Status: DISCONTINUED | OUTPATIENT
Start: 2023-12-27 | End: 2024-01-02 | Stop reason: HOSPADM

## 2023-12-27 RX ORDER — ACETAMINOPHEN 325 MG/1
975 TABLET ORAL EVERY 8 HOURS
Status: DISCONTINUED | OUTPATIENT
Start: 2023-12-27 | End: 2023-12-27

## 2023-12-27 RX ORDER — AMOXICILLIN 250 MG
1 CAPSULE ORAL 2 TIMES DAILY
Status: DISCONTINUED | OUTPATIENT
Start: 2023-12-27 | End: 2024-01-02 | Stop reason: HOSPADM

## 2023-12-27 RX ORDER — NALOXONE HYDROCHLORIDE 0.4 MG/ML
0.2 INJECTION, SOLUTION INTRAMUSCULAR; INTRAVENOUS; SUBCUTANEOUS
Status: DISCONTINUED | OUTPATIENT
Start: 2023-12-27 | End: 2024-01-02 | Stop reason: HOSPADM

## 2023-12-27 RX ORDER — LIDOCAINE 40 MG/G
CREAM TOPICAL
Status: DISCONTINUED | OUTPATIENT
Start: 2023-12-27 | End: 2023-12-27 | Stop reason: HOSPADM

## 2023-12-27 RX ORDER — ONDANSETRON 2 MG/ML
4 INJECTION INTRAMUSCULAR; INTRAVENOUS EVERY 6 HOURS PRN
Status: DISCONTINUED | OUTPATIENT
Start: 2023-12-27 | End: 2024-01-02 | Stop reason: HOSPADM

## 2023-12-27 RX ORDER — ACETAMINOPHEN 325 MG/1
975 TABLET ORAL EVERY 8 HOURS
Qty: 27 TABLET | Refills: 0 | Status: COMPLETED | OUTPATIENT
Start: 2023-12-27 | End: 2023-12-30

## 2023-12-27 RX ORDER — PHENYLEPHRINE HCL IN 0.9% NACL 50MG/250ML
PLASTIC BAG, INJECTION (ML) INTRAVENOUS
Status: DISCONTINUED
Start: 2023-12-27 | End: 2023-12-27 | Stop reason: HOSPADM

## 2023-12-27 RX ORDER — PROPOFOL 10 MG/ML
INJECTION, EMULSION INTRAVENOUS CONTINUOUS PRN
Status: DISCONTINUED | OUTPATIENT
Start: 2023-12-27 | End: 2023-12-27

## 2023-12-27 RX ORDER — HYDROMORPHONE HYDROCHLORIDE 1 MG/ML
0.2 INJECTION, SOLUTION INTRAMUSCULAR; INTRAVENOUS; SUBCUTANEOUS EVERY 5 MIN PRN
Status: DISCONTINUED | OUTPATIENT
Start: 2023-12-27 | End: 2023-12-27 | Stop reason: HOSPADM

## 2023-12-27 RX ORDER — FENTANYL CITRATE 50 UG/ML
25-100 INJECTION, SOLUTION INTRAMUSCULAR; INTRAVENOUS
Status: DISCONTINUED | OUTPATIENT
Start: 2023-12-27 | End: 2023-12-27 | Stop reason: HOSPADM

## 2023-12-27 RX ORDER — CEFADROXIL 500 MG/1
500 CAPSULE ORAL 2 TIMES DAILY
Qty: 28 CAPSULE | Refills: 0 | Status: SHIPPED | OUTPATIENT
Start: 2023-12-27 | End: 2024-01-11

## 2023-12-27 RX ORDER — TRANEXAMIC ACID 100 MG/ML
INJECTION, SOLUTION INTRAVENOUS
Status: COMPLETED
Start: 2023-12-27 | End: 2023-12-27

## 2023-12-27 RX ORDER — VANCOMYCIN HYDROCHLORIDE 1 G/20ML
INJECTION, POWDER, LYOPHILIZED, FOR SOLUTION INTRAVENOUS PRN
Status: DISCONTINUED | OUTPATIENT
Start: 2023-12-27 | End: 2023-12-27 | Stop reason: HOSPADM

## 2023-12-27 RX ORDER — HYDROMORPHONE HCL IN WATER/PF 6 MG/30 ML
0.1 PATIENT CONTROLLED ANALGESIA SYRINGE INTRAVENOUS
Status: DISCONTINUED | OUTPATIENT
Start: 2023-12-27 | End: 2024-01-02 | Stop reason: HOSPADM

## 2023-12-27 RX ORDER — CEFAZOLIN SODIUM 1 G/3ML
INJECTION, POWDER, FOR SOLUTION INTRAMUSCULAR; INTRAVENOUS PRN
Status: DISCONTINUED | OUTPATIENT
Start: 2023-12-27 | End: 2023-12-27

## 2023-12-27 RX ORDER — POLYETHYLENE GLYCOL 3350 17 G/17G
17 POWDER, FOR SOLUTION ORAL DAILY
Status: DISCONTINUED | OUTPATIENT
Start: 2023-12-28 | End: 2023-12-27

## 2023-12-27 RX ORDER — ACETAMINOPHEN 325 MG/1
650 TABLET ORAL EVERY 4 HOURS PRN
Status: DISCONTINUED | OUTPATIENT
Start: 2023-12-30 | End: 2024-01-02 | Stop reason: HOSPADM

## 2023-12-27 RX ORDER — OXYCODONE HYDROCHLORIDE 5 MG/1
5 TABLET ORAL EVERY 4 HOURS PRN
Status: DISCONTINUED | OUTPATIENT
Start: 2023-12-27 | End: 2024-01-02 | Stop reason: HOSPADM

## 2023-12-27 RX ORDER — AMOXICILLIN 250 MG
1 CAPSULE ORAL 2 TIMES DAILY
Status: DISCONTINUED | OUTPATIENT
Start: 2023-12-27 | End: 2023-12-27

## 2023-12-27 RX ORDER — CEFAZOLIN SODIUM 1 G/3ML
1 INJECTION, POWDER, FOR SOLUTION INTRAMUSCULAR; INTRAVENOUS EVERY 8 HOURS
Status: COMPLETED | OUTPATIENT
Start: 2023-12-28 | End: 2023-12-29

## 2023-12-27 RX ORDER — POLYETHYLENE GLYCOL 3350 17 G/17G
1 POWDER, FOR SOLUTION ORAL DAILY
Qty: 7 PACKET | Refills: 0 | Status: SHIPPED | OUTPATIENT
Start: 2023-12-27 | End: 2024-03-04

## 2023-12-27 RX ORDER — BUPIVACAINE HYDROCHLORIDE 2.5 MG/ML
INJECTION, SOLUTION EPIDURAL; INFILTRATION; INTRACAUDAL
Status: COMPLETED | OUTPATIENT
Start: 2023-12-27 | End: 2023-12-27

## 2023-12-27 RX ORDER — OXYCODONE HYDROCHLORIDE 5 MG/1
5 TABLET ORAL EVERY 4 HOURS PRN
Status: DISCONTINUED | OUTPATIENT
Start: 2023-12-27 | End: 2023-12-27

## 2023-12-27 RX ORDER — HYDROMORPHONE HYDROCHLORIDE 1 MG/ML
0.1 INJECTION, SOLUTION INTRAMUSCULAR; INTRAVENOUS; SUBCUTANEOUS
Status: DISCONTINUED | OUTPATIENT
Start: 2023-12-27 | End: 2023-12-27

## 2023-12-27 RX ORDER — PROCHLORPERAZINE MALEATE 5 MG
5 TABLET ORAL EVERY 6 HOURS PRN
Status: DISCONTINUED | OUTPATIENT
Start: 2023-12-27 | End: 2023-12-27

## 2023-12-27 RX ORDER — NALOXONE HYDROCHLORIDE 0.4 MG/ML
0.4 INJECTION, SOLUTION INTRAMUSCULAR; INTRAVENOUS; SUBCUTANEOUS
Status: DISCONTINUED | OUTPATIENT
Start: 2023-12-27 | End: 2024-01-02 | Stop reason: HOSPADM

## 2023-12-27 RX ORDER — CALCIUM CARBONATE 500 MG/1
1000 TABLET, CHEWABLE ORAL 4 TIMES DAILY PRN
Status: DISCONTINUED | OUTPATIENT
Start: 2023-12-27 | End: 2024-01-02 | Stop reason: HOSPADM

## 2023-12-27 RX ORDER — AMOXICILLIN 250 MG
1 CAPSULE ORAL 2 TIMES DAILY PRN
Status: DISCONTINUED | OUTPATIENT
Start: 2023-12-27 | End: 2024-01-02 | Stop reason: HOSPADM

## 2023-12-27 RX ORDER — HYDROMORPHONE HYDROCHLORIDE 1 MG/ML
0.4 INJECTION, SOLUTION INTRAMUSCULAR; INTRAVENOUS; SUBCUTANEOUS EVERY 5 MIN PRN
Status: DISCONTINUED | OUTPATIENT
Start: 2023-12-27 | End: 2023-12-27 | Stop reason: HOSPADM

## 2023-12-27 RX ORDER — SODIUM CHLORIDE, SODIUM LACTATE, POTASSIUM CHLORIDE, CALCIUM CHLORIDE 600; 310; 30; 20 MG/100ML; MG/100ML; MG/100ML; MG/100ML
INJECTION, SOLUTION INTRAVENOUS CONTINUOUS
Status: DISCONTINUED | OUTPATIENT
Start: 2023-12-27 | End: 2023-12-27 | Stop reason: HOSPADM

## 2023-12-27 RX ORDER — PROCHLORPERAZINE 25 MG
12.5 SUPPOSITORY, RECTAL RECTAL EVERY 12 HOURS PRN
Status: DISCONTINUED | OUTPATIENT
Start: 2023-12-27 | End: 2024-01-02 | Stop reason: HOSPADM

## 2023-12-27 RX ORDER — HYDROMORPHONE HCL IN WATER/PF 6 MG/30 ML
0.2 PATIENT CONTROLLED ANALGESIA SYRINGE INTRAVENOUS
Status: DISCONTINUED | OUTPATIENT
Start: 2023-12-27 | End: 2024-01-02 | Stop reason: HOSPADM

## 2023-12-27 RX ORDER — POLYETHYLENE GLYCOL 3350 17 G/17G
17 POWDER, FOR SOLUTION ORAL 2 TIMES DAILY PRN
Status: DISCONTINUED | OUTPATIENT
Start: 2023-12-27 | End: 2024-01-02 | Stop reason: HOSPADM

## 2023-12-27 RX ORDER — ONDANSETRON 2 MG/ML
4 INJECTION INTRAMUSCULAR; INTRAVENOUS EVERY 30 MIN PRN
Status: DISCONTINUED | OUTPATIENT
Start: 2023-12-27 | End: 2023-12-27 | Stop reason: HOSPADM

## 2023-12-27 RX ORDER — SODIUM CHLORIDE, SODIUM LACTATE, POTASSIUM CHLORIDE, CALCIUM CHLORIDE 600; 310; 30; 20 MG/100ML; MG/100ML; MG/100ML; MG/100ML
INJECTION, SOLUTION INTRAVENOUS CONTINUOUS PRN
Status: DISCONTINUED | OUTPATIENT
Start: 2023-12-27 | End: 2023-12-27

## 2023-12-27 RX ORDER — ONDANSETRON 4 MG/1
4 TABLET, ORALLY DISINTEGRATING ORAL EVERY 6 HOURS PRN
Status: DISCONTINUED | OUTPATIENT
Start: 2023-12-27 | End: 2023-12-27

## 2023-12-27 RX ORDER — AMOXICILLIN 250 MG
1-2 CAPSULE ORAL 2 TIMES DAILY
Qty: 30 TABLET | Refills: 0 | Status: SHIPPED | OUTPATIENT
Start: 2023-12-27 | End: 2024-03-04

## 2023-12-27 RX ORDER — SODIUM CHLORIDE 9 MG/ML
INJECTION, SOLUTION INTRAVENOUS CONTINUOUS PRN
Status: DISCONTINUED | OUTPATIENT
Start: 2023-12-27 | End: 2023-12-27

## 2023-12-27 RX ORDER — FENTANYL CITRATE 50 UG/ML
25 INJECTION, SOLUTION INTRAMUSCULAR; INTRAVENOUS EVERY 5 MIN PRN
Status: DISCONTINUED | OUTPATIENT
Start: 2023-12-27 | End: 2023-12-27 | Stop reason: HOSPADM

## 2023-12-27 RX ORDER — ACETAMINOPHEN 325 MG/1
650 TABLET ORAL EVERY 4 HOURS PRN
Status: DISCONTINUED | OUTPATIENT
Start: 2023-12-30 | End: 2023-12-27

## 2023-12-27 RX ORDER — ONDANSETRON 4 MG/1
4 TABLET, ORALLY DISINTEGRATING ORAL EVERY 6 HOURS PRN
Status: DISCONTINUED | OUTPATIENT
Start: 2023-12-27 | End: 2024-01-02 | Stop reason: HOSPADM

## 2023-12-27 RX ORDER — POLYETHYLENE GLYCOL 3350 17 G/17G
17 POWDER, FOR SOLUTION ORAL DAILY
Status: DISCONTINUED | OUTPATIENT
Start: 2023-12-28 | End: 2023-12-29

## 2023-12-27 RX ORDER — DEXMEDETOMIDINE HYDROCHLORIDE 4 UG/ML
INJECTION, SOLUTION INTRAVENOUS
Status: DISCONTINUED
Start: 2023-12-27 | End: 2023-12-27 | Stop reason: HOSPADM

## 2023-12-27 RX ORDER — ASPIRIN 81 MG/1
81 TABLET ORAL 2 TIMES DAILY
Qty: 60 TABLET | Refills: 0 | Status: SHIPPED | OUTPATIENT
Start: 2023-12-27 | End: 2024-03-04

## 2023-12-27 RX ORDER — FENTANYL CITRATE 50 UG/ML
INJECTION, SOLUTION INTRAMUSCULAR; INTRAVENOUS PRN
Status: DISCONTINUED | OUTPATIENT
Start: 2023-12-27 | End: 2023-12-27

## 2023-12-27 RX ORDER — DEXAMETHASONE SODIUM PHOSPHATE 10 MG/ML
INJECTION, SOLUTION INTRAMUSCULAR; INTRAVENOUS PRN
Status: DISCONTINUED | OUTPATIENT
Start: 2023-12-27 | End: 2023-12-27

## 2023-12-27 RX ORDER — DEXTROSE MONOHYDRATE 25 G/50ML
25-50 INJECTION, SOLUTION INTRAVENOUS
Status: DISCONTINUED | OUTPATIENT
Start: 2023-12-27 | End: 2024-01-02 | Stop reason: HOSPADM

## 2023-12-27 RX ORDER — BISACODYL 10 MG
10 SUPPOSITORY, RECTAL RECTAL DAILY PRN
Status: DISCONTINUED | OUTPATIENT
Start: 2023-12-27 | End: 2024-01-02 | Stop reason: HOSPADM

## 2023-12-27 RX ORDER — FENTANYL CITRATE 50 UG/ML
50 INJECTION, SOLUTION INTRAMUSCULAR; INTRAVENOUS EVERY 5 MIN PRN
Status: DISCONTINUED | OUTPATIENT
Start: 2023-12-27 | End: 2023-12-27 | Stop reason: HOSPADM

## 2023-12-27 RX ORDER — ONDANSETRON 2 MG/ML
INJECTION INTRAMUSCULAR; INTRAVENOUS PRN
Status: DISCONTINUED | OUTPATIENT
Start: 2023-12-27 | End: 2023-12-27

## 2023-12-27 RX ORDER — ACETAMINOPHEN 325 MG/1
650 TABLET ORAL EVERY 4 HOURS PRN
Qty: 100 TABLET | Refills: 0 | Status: SHIPPED | OUTPATIENT
Start: 2023-12-27 | End: 2024-01-08

## 2023-12-27 RX ORDER — CEFAZOLIN SODIUM/WATER 2 G/20 ML
SYRINGE (ML) INTRAVENOUS
Status: DISCONTINUED
Start: 2023-12-27 | End: 2023-12-27 | Stop reason: HOSPADM

## 2023-12-27 RX ORDER — PROCHLORPERAZINE 25 MG
12.5 SUPPOSITORY, RECTAL RECTAL EVERY 12 HOURS PRN
Status: DISCONTINUED | OUTPATIENT
Start: 2023-12-27 | End: 2023-12-27

## 2023-12-27 RX ORDER — ASPIRIN 81 MG/1
81 TABLET ORAL 2 TIMES DAILY
Status: DISCONTINUED | OUTPATIENT
Start: 2023-12-27 | End: 2024-01-02 | Stop reason: HOSPADM

## 2023-12-27 RX ORDER — FENTANYL CITRATE 50 UG/ML
100 INJECTION, SOLUTION INTRAMUSCULAR; INTRAVENOUS ONCE
Status: COMPLETED | OUTPATIENT
Start: 2023-12-27 | End: 2023-12-27

## 2023-12-27 RX ORDER — CEPHALEXIN 500 MG/1
500 CAPSULE ORAL EVERY 6 HOURS SCHEDULED
Qty: 52 CAPSULE | Refills: 0 | Status: DISCONTINUED | OUTPATIENT
Start: 2023-12-30 | End: 2023-12-30

## 2023-12-27 RX ORDER — SODIUM CHLORIDE, SODIUM LACTATE, POTASSIUM CHLORIDE, CALCIUM CHLORIDE 600; 310; 30; 20 MG/100ML; MG/100ML; MG/100ML; MG/100ML
INJECTION, SOLUTION INTRAVENOUS CONTINUOUS
Status: DISCONTINUED | OUTPATIENT
Start: 2023-12-27 | End: 2023-12-29

## 2023-12-27 RX ADMIN — FENTANYL CITRATE 100 MCG: 50 INJECTION, SOLUTION INTRAMUSCULAR; INTRAVENOUS at 12:27

## 2023-12-27 RX ADMIN — SODIUM CHLORIDE: 9 INJECTION, SOLUTION INTRAVENOUS at 17:29

## 2023-12-27 RX ADMIN — FENTANYL CITRATE 50 MCG: 50 INJECTION, SOLUTION INTRAMUSCULAR; INTRAVENOUS at 15:56

## 2023-12-27 RX ADMIN — PHENYLEPHRINE HYDROCHLORIDE 50 MCG: 10 INJECTION INTRAVENOUS at 17:36

## 2023-12-27 RX ADMIN — TRANEXAMIC ACID 1 G: 1 INJECTION, SOLUTION INTRAVENOUS at 18:30

## 2023-12-27 RX ADMIN — PHENYLEPHRINE HYDROCHLORIDE 0.5 MCG/KG/MIN: 10 INJECTION INTRAVENOUS at 16:35

## 2023-12-27 RX ADMIN — FENTANYL CITRATE 25 MCG: 50 INJECTION INTRAMUSCULAR; INTRAVENOUS at 16:24

## 2023-12-27 RX ADMIN — INSULIN GLARGINE 10 UNITS: 100 INJECTION, SOLUTION SUBCUTANEOUS at 22:39

## 2023-12-27 RX ADMIN — SODIUM CHLORIDE 500 ML: 9 INJECTION, SOLUTION INTRAVENOUS at 12:16

## 2023-12-27 RX ADMIN — ACETAMINOPHEN 975 MG: 325 TABLET ORAL at 22:36

## 2023-12-27 RX ADMIN — PHENYLEPHRINE HYDROCHLORIDE 100 MCG: 10 INJECTION INTRAVENOUS at 17:03

## 2023-12-27 RX ADMIN — TRANEXAMIC ACID 1 G: 1 INJECTION, SOLUTION INTRAVENOUS at 16:42

## 2023-12-27 RX ADMIN — PHENYLEPHRINE HYDROCHLORIDE 100 MCG: 10 INJECTION INTRAVENOUS at 17:09

## 2023-12-27 RX ADMIN — PHENYLEPHRINE HYDROCHLORIDE 100 MCG: 10 INJECTION INTRAVENOUS at 17:15

## 2023-12-27 RX ADMIN — BUPIVACAINE HYDROCHLORIDE 2.2 ML: 5 INJECTION, SOLUTION EPIDURAL; INTRACAUDAL; PERINEURAL at 16:26

## 2023-12-27 RX ADMIN — ONDANSETRON 4 MG: 2 INJECTION INTRAMUSCULAR; INTRAVENOUS at 17:00

## 2023-12-27 RX ADMIN — PHENYLEPHRINE HYDROCHLORIDE 100 MCG: 10 INJECTION INTRAVENOUS at 17:06

## 2023-12-27 RX ADMIN — PHENYLEPHRINE HYDROCHLORIDE 50 MCG: 10 INJECTION INTRAVENOUS at 17:54

## 2023-12-27 RX ADMIN — PROPOFOL 75 MCG/KG/MIN: 10 INJECTION, EMULSION INTRAVENOUS at 16:36

## 2023-12-27 RX ADMIN — BUPIVACAINE HYDROCHLORIDE 40 ML: 2.5 INJECTION, SOLUTION EPIDURAL; INFILTRATION; INTRACAUDAL; PERINEURAL at 15:56

## 2023-12-27 RX ADMIN — CEFAZOLIN 2 G: 1 INJECTION, POWDER, FOR SOLUTION INTRAMUSCULAR; INTRAVENOUS at 16:25

## 2023-12-27 RX ADMIN — SENNOSIDES AND DOCUSATE SODIUM 1 TABLET: 50; 8.6 TABLET ORAL at 22:37

## 2023-12-27 RX ADMIN — SODIUM CHLORIDE, POTASSIUM CHLORIDE, SODIUM LACTATE AND CALCIUM CHLORIDE: 600; 310; 30; 20 INJECTION, SOLUTION INTRAVENOUS at 15:38

## 2023-12-27 RX ADMIN — Medication 81 MG: at 22:36

## 2023-12-27 RX ADMIN — PHENYLEPHRINE HYDROCHLORIDE 50 MCG: 10 INJECTION INTRAVENOUS at 17:57

## 2023-12-27 ASSESSMENT — ACTIVITIES OF DAILY LIVING (ADL)
ADLS_ACUITY_SCORE: 35
ADLS_ACUITY_SCORE: 18
ADLS_ACUITY_SCORE: 18
ADLS_ACUITY_SCORE: 35
ADLS_ACUITY_SCORE: 18
ADLS_ACUITY_SCORE: 18

## 2023-12-27 NOTE — INTERVAL H&P NOTE
I have reviewed the surgical (or preoperative) H&P that is linked to this encounter, and examined the patient. There are no significant changes    Clinical Conditions Present on Arrival:  Clinically Significant Risk Factors Present on Admission         # Hyponatremia: Lowest Na = 132 mmol/L in last 30 days, will monitor as appropriate          # DMII: A1C = >15.0 % (Ref range: 0.0 - 5.6 %) within past 6 months

## 2023-12-27 NOTE — ED PROVIDER NOTES
EMERGENCY DEPARTMENT ENCOUNTER      NAME: Claudia May  AGE: 78 year old female  YOB: 1945  MRN: [id number]  EVALUATION DATE & TIME: 12/27/2023 11:33 AM    PCP: Stacey Morgan    ED PROVIDER: Donna Grider M.D.      Chief Complaint   Patient presents with    Hip Pain         FINAL IMPRESSION:  1. Hip fracture, right, closed, initial encounter (H)    2. Fall, initial encounter          ED COURSE & MEDICAL DECISION MAKING:    ED Course as of 12/27/23 1331   Wed Dec 27, 2023   1144 Pt sent to the ED by [facility] resident who I spoke wtih with right hip pain after fall last night onto buttocks, neuro intact but right hip painful with right leg shortened and externally rotated and outpatient XR with right femoral neck fracture, amenable to CT head and c-spine after I spoke with her and daughter who is helping to translate Steven with her consent,  pending preop labs and EKG/XR in patient who ambulates normaly with cane, fentanyl given for pain   1327 Pt endorsed to [facility] resident to med surg   1330 I spoke with [facility] who will see patient shortly while admitted, wants continue NPO in case can do procedure today       Pertinent Labs & Imaging studies reviewed. (See chart for details)    N95 worn  A face shield was worn also  COVID PPE    Medical Decision Making    History:  Supplemental history from: Documented in chart, if applicable  External Record(s) reviewed: Documented in chart, if applicable.    Work Up:  Chart documentation includes differential considered and any EKGs or imaging independently interpreted by provider, where specified.  In additional to work up documented, I considered the following work up: Documented in chart, if applicable.    External consultation:  Discussion of management with another provider: Documented in chart, if applicable and Hospitalist and Orthopedics    Complicating factors:  Care impacted by chronic illness: Dementia, Diabetes, Hyperlipidemia, and Hypertension  Care  affected by social determinants of health: N/A    Disposition considerations: Admit.        At the conclusion of the encounter I discussed the results of all of the tests and the disposition. The questions were answered. The patient or family acknowledged understanding and was agreeable with the care plan.     MEDICATIONS GIVEN IN THE EMERGENCY:  Medications   fentaNYL (PF) (SUBLIMAZE) injection 100 mcg (100 mcg Intravenous $Given 12/27/23 8709)   sodium chloride 0.9% BOLUS 500 mL (0 mLs Intravenous Stopped 12/27/23 1329)       NEW PRESCRIPTIONS STARTED AT TODAY'S ER VISIT  New Prescriptions    No medications on file          =================================================================    HPI      Claudia Mckeon is a 78 year old female with PMHx of dementia, HTN, HLD, frequent falls, T2DM, and recurrent UTIs, who presents to the ED today via EMS with injuries sustained during a fall.    Per chart review, the patient was seen earlier today at [facility] for a follow up appointment. During this visit, the patient had an XR hip that showed a right hip fracture. Patient was referred to the ED for evaluation and admission.     Last night at 0100, the patient was attempting to walk down the stairs in her house. She fell onto her buttocks and was able to scoot down the stairs to call for help. She was unable to get up on her own. Patient endorses right hip pain. She did not hit her head during the fall. Fall was unwitnessed. This morning, the patient had a scheduled appointment at her clinic. When her family brought her, the clinic referred them to the ED for further evaluation. Patient ambulatory with a cane at baseline. She does not have neck or back pain at this time. Patient is on aspirin, but no other blood thinning medications. She took tylenol this morning, but no other pain medications at home. No other complaints at this time.       REVIEW OF SYSTEMS   All other systems reviewed and are negative except as  noted above in HPI.    PAST MEDICAL HISTORY:  No past medical history on file.    PAST SURGICAL HISTORY:  Past Surgical History:   Procedure Laterality Date    BREAST CYST ASPIRATION Left     left breast abcess drained       CURRENT MEDICATIONS:    acetaminophen (TYLENOL) 500 MG tablet  aspirin 81 MG EC tablet  atorvastatin (LIPITOR) 20 MG tablet  blood sugar diagnostic (ONE TOUCH ULTRA TEST) Strp  cholecalciferol, vitamin D3, (VITAMIN D3) 2,000 unit cap  empagliflozin (JARDIANCE) 10 MG TABS tablet  ergocalciferol (ERGOCALCIFEROL) 50,000 unit capsule  FLUoxetine (PROZAC) 20 MG capsule  gabapentin (NEURONTIN) 100 MG capsule  hypromellose-dextran (ARTIFICAL TEARS) 0.1-0.3 % ophthalmic solution  lisinopril (PRINIVIL,ZESTRIL) 5 MG tablet  MEDICATION CANNOT BE REORDERED - PLEASE MANUALLY REORDER AND DISCONTINUE THE OLD ORDER  metFORMIN (GLUCOPHAGE) 500 MG tablet  methyl salicylate-menthol (ICY HOT) 30-10 % Crea  METHYL SALICYLATE/MENTHOL (ICY HOT EXTRA STRENGTH TOP)  peg 400-propylene glycol PF (SYSTANE ULTRA, PF,) 0.4-0.3 % Dpet        ALLERGIES:  Allergies   Allergen Reactions    Ibuprofen Rash       FAMILY HISTORY:  No family history on file.    SOCIAL HISTORY:   Social History     Socioeconomic History    Marital status:    Tobacco Use    Smoking status: Never     Passive exposure: Never    Smokeless tobacco: Never   Social History Narrative    Steven     Social Determinants of Health     Financial Resource Strain: Low Risk  (12/27/2023)    Financial Resource Strain     Within the past 12 months, have you or your family members you live with been unable to get utilities (heat, electricity) when it was really needed?: No   Food Insecurity: Low Risk  (12/27/2023)    Food Insecurity     Within the past 12 months, did you worry that your food would run out before you got money to buy more?: No     Within the past 12 months, did the food you bought just not last and you didn t have money to get more?: No    Transportation Needs: Low Risk  (12/27/2023)    Transportation Needs     Within the past 12 months, has lack of transportation kept you from medical appointments, getting your medicines, non-medical meetings or appointments, work, or from getting things that you need?: No   Interpersonal Safety: Low Risk  (12/19/2023)    Interpersonal Safety     Do you feel physically and emotionally safe where you currently live?: Yes     Within the past 12 months, have you been hit, slapped, kicked or otherwise physically hurt by someone?: No     Within the past 12 months, have you been humiliated or emotionally abused in other ways by your partner or ex-partner?: No   Housing Stability: Low Risk  (12/27/2023)    Housing Stability     Do you have housing? : Yes     Are you worried about losing your housing?: No       VITALS:  Patient Vitals for the past 24 hrs:   BP Temp Temp src Pulse Resp SpO2   12/27/23 1136 102/51 98.6  F (37  C) Oral 93 18 93 %       PHYSICAL EXAM    PHYSICAL EXAM    VITAL SIGNS: /51   Pulse 93   Temp 98.6  F (37  C) (Oral)   Resp 18   SpO2 93%    GENERAL: Awake, alert.  In no acute distress. GCS 15  HEENT: Normocephalic, atraumatic.  Pupils equal, round and reactive.  Conjunctiva normal.  EOMI. No ren sign, no racoon eyes, no mastoid tenderness, no hemotympanum, no facial instability, no nasal bridge pain, no nasal septal hematoma, no intraoral lacerations, no loose teeth, no mandible pain or deformity  NECK: No stridor or apparent deformity. No midline pain to palpation.  PULMONARY: Symmetrical breath sounds without distress.  Lungs clear to auscultation bilaterally without wheezes, rhonchi or rales.  CARDIO: Regular rate and rhythm.  No significant murmur, rub or gallop.  Radial pulses strong and symmetrical.  THORAX: No focal chest wall deformity or crepitus  BACK: No focal tenderness or deformity to each vertebral level in midline  ABDOMINAL: Abdomen soft, non-distended and non-tender to  palpation.  No CVAT, BL.  EXTREMITIES: No lower extremity swelling or edema. Pelvis stable and without focal tenderness. Bilateral pedal pulses 2+ and equal. Right hip tenderness. Right lower extremity shortened and externally rotated.   NEURO: Alert and oriented to person, place and time.  Cranial nerves grossly intact.  No focal motor deficit. Sensation globally intact.  PSYCH: Normal mood and affect  SKIN: No rashes    LAB:  All pertinent labs reviewed and interpreted.  Results for orders placed or performed during the hospital encounter of 12/27/23   Result Value Ref Range    INR     Basic metabolic panel   Result Value Ref Range    Sodium 134 (L) 135 - 145 mmol/L    Potassium 4.5 3.4 - 5.3 mmol/L    Chloride 98 98 - 107 mmol/L    Carbon Dioxide (CO2) 22 22 - 29 mmol/L    Anion Gap 14 7 - 15 mmol/L    Urea Nitrogen 18.8 8.0 - 23.0 mg/dL    Creatinine 0.77 0.51 - 0.95 mg/dL    GFR Estimate 78 >60 mL/min/1.73m2    Calcium 8.7 (L) 8.8 - 10.2 mg/dL    Glucose 339 (H) 70 - 99 mg/dL   Lactic acid whole blood   Result Value Ref Range    Lactic Acid 1.4 0.7 - 2.0 mmol/L   Result Value Ref Range    Procalcitonin 0.24 <0.50 ng/mL   Ethyl Alcohol Level   Result Value Ref Range    Alcohol ethyl <0.01 <=0.01 g/dL   Symptomatic Influenza A/B, RSV, & SARS-CoV2 PCR (COVID-19) Nasopharyngeal    Specimen: Nasopharyngeal; Swab   Result Value Ref Range    Influenza A PCR Negative Negative    Influenza B PCR Negative Negative    RSV PCR Negative Negative    SARS CoV2 PCR Negative Negative   CBC with platelets and differential   Result Value Ref Range    WBC Count 10.5 4.0 - 11.0 10e3/uL    RBC Count 3.22 (L) 3.80 - 5.20 10e6/uL    Hemoglobin 9.3 (L) 11.7 - 15.7 g/dL    Hematocrit 27.9 (L) 35.0 - 47.0 %    MCV 87 78 - 100 fL    MCH 28.9 26.5 - 33.0 pg    MCHC 33.3 31.5 - 36.5 g/dL    RDW 12.5 10.0 - 15.0 %    Platelet Count      % Neutrophils 55 %    % Lymphocytes 33 %    % Monocytes 9 %    % Eosinophils 1 %    % Basophils 1 %    %  Immature Granulocytes 1 %    NRBCs per 100 WBC 0 <1 /100    Absolute Neutrophils 6.0 1.6 - 8.3 10e3/uL    Absolute Lymphocytes 3.4 0.8 - 5.3 10e3/uL    Absolute Monocytes 0.9 0.0 - 1.3 10e3/uL    Absolute Eosinophils 0.1 0.0 - 0.7 10e3/uL    Absolute Basophils 0.1 0.0 - 0.2 10e3/uL    Absolute Immature Granulocytes 0.1 <=0.4 10e3/uL    Absolute NRBCs 0.0 10e3/uL   Adult Type and Screen   Result Value Ref Range    ABO/RH(D) B POS     SPECIMEN EXPIRATION DATE [id number]        RADIOLOGY:  Reviewed all pertinent imaging. Please see official radiology report.  XR Chest Port 1 View    (Results Pending)   XR Pelvis and Hip Right 2 Views    (Results Pending)   XR Knee Right 3 Views    (Results Pending)   XR Femur Right 2 Views    (Results Pending)   CT Head w/o Contrast    (Results Pending)   CT Cervical Spine w/o Contrast    (Results Pending)         EKG:    Reviewed and interpreted as:   Time: 12/27/23 1216  Rate: 95 bpm  Sinus rhythm  No ST abnormalities  No prior ECGs available for comparison.       I have independently reviewed and interpreted the EKG(s) documented above.        I, Sherley Newsome, am serving as a scribe to document services personally performed by Dr. Donna Grider based on my observation and the provider's statements to me. I, Donna Grider MD attest that Sherley Newsome is acting in a scribe capacity, has observed my performance of the services and has documented them in accordance with my direction.       Donna Grider MD  12/27/23 6449

## 2023-12-27 NOTE — ED NOTES
Expected Patient Referral to ED  11:03 AM    Referring Clinic/Provider:  [facility]    Reason for referral/Clinical facts:  Right femoral neck fx on x-ray after fall yesterday    Recommendations provided:  Send to ED for further evaluation, I requested he direct admit to his service    Caller was informed that this institution does possess the capabilities and/or resources to provide for patient and should be transferred to our facility.    Discussed that if direct admit is sought and any hurdles are encountered, this ED would be happy to see the patient and evaluate.    Informed caller that recommendations provided are recommendations based only on the facts provided and that they responsible to accept or reject the advice, or to seek a formal in person consultation as needed and that this ED will see/treat patient should they arrive.      Donna Grider MD  [facility] EMERGENCY DEPARTMENT  [address] [place] [id number]  [phone]       Donna Grider MD  12/27/23 1109

## 2023-12-27 NOTE — PROGRESS NOTES
Preceptor Attestation:   Patient seen, evaluated and discussed with the resident. I have verified the content of the note, which accurately reflects my assessment of the patient and the plan of care.  Agree with transfer of care to ER with plan for hospitalization for hip fracture.  Will need insulin teaching as well.     Supervising Physician:Leanne Barillas MD    [facility]

## 2023-12-27 NOTE — H&P
[facility]    History and Physical - Hospitalist Service       Date of Admission:  12/27/2023    Assessment & Plan      Claudia Mckeon is a 78 year old female admitted on 12/27/2023. She has a history of uncontrolled T2DM, HTN, HLD and is admitted for right hip fracture after a fall. Going to OR 12/27/23 for repair.     Fall  Acute right hip fracture  Patient had a fall around 0100 on 12/27. Uses walker at baseline. She fell on her butt, did not hit her head, no LOC, no blood thinners (baby aspirin only). Immediately had right hip pain and was unable to stand up, had to crawl to ask for help. Went to [facility] 12/27 AM and found to have right hip fracture and sent to [facility] ED. On admission, imaging was obtained which showed an intertrochanteric fracture of the right femur. Ortho to see patient and may go to OR today, 12/27.   - Ortho consult, appreciate their recs   - Operative repair on 12/27  - PT/OT/SW consults  - Imaging   - CT H and cervical spine: No acute change. Degenerative changes present   - XR R Femur and pelvis: R Intertrochanteric fracture with moderate displacement   - XR R knee: No fracture. Degenerative changes. Osteopenia.   - Pain control:   - Heat, ice, tylenol   - IV dilaudid and PO oxycodone  - NPO for potential procedure today    Anemia  Patient with hgb of 9.3 on admission with recent value of 13.4 one week ago. MCV 87. No signs of active bleeding on exam other than into the ecchymosis of her right inner thigh.   - Hgb check at 1800  - Daily CBC  - Type and screen performed, consent obtained     Type 2 DM  A1c over 15 on 12/19/23. Patient on oral medications at home, likely needs insulin therapy at this point. Will hold PTA oral anti-diabetic medications in favor of insulin regimen during stay.   - Holding PTA jardiance and metformin  - PTA gabapentin  - Will use insulin for glucose control while admitted   - TDD of insulin 20 units based on BMI weight based  "estimation   - As patient is likely to be on a diet after operation today we will give lantus 10 units this evening, 3 units mealtime and medium sliding scale correction   - Would likely benefit from insulin management in outpatient setting        Osteopenia  Osteopenia noted in right knee of imaging today. Do not see history of DEXA scan.   - Could consider DEXA scan in outpatient setting    Constipation  No bowel movement for 2-3 days and some subjective \"gassy pain\"  - Scheduled senna and miralax in the setting of hip repair and likely opioid use  - PRN: Senna and miralax  - Will monitor for BM    Chronic:  HTN: Patient had lisinopril on medlist previously but was not taking it on med rec.   HLD: PTA lipitor    Preop clearance: Patient's risk factors include HTN, HLD, T2DM. EKG shows sinus rhythm without signs of ischemia. Patient's functional capacity is Poor: < 4 METS: Vacuuming, activities of daily living, walking 2mph, writing  Revised Cardiac Risk Index for Pre-Operative Risk  High risk surgery? No  History of ischemic heart disease? No  History of CHF? No  History of cerebrovascular disease? No  Pre-op treatment w/ insulin? Yes  Pre-op creatinine > 2.0? No    1 point = class II risk 0.9% risk of major cardiac event  Patient is cleared for operative repair of her hip if needed without any additional preoperative testing.           Diet: NPO per Anesthesia Guidelines for Procedure/Surgery Except for: Meds    DVT Prophylaxis: Pneumatic Compression Devices  Faith Catheter: Not present  Fluids: NPO, for surgery. PO afterwards.   Lines: None     Cardiac Monitoring: None  Code Status: Full Code    Clinically Significant Risk Factors Present on Admission                    # Dementia: noted on problem list   # DMII: A1C = >15.0 % (Ref range: 0.0 - 5.6 %) within past 6 months               Disposition Plan      Expected Discharge Date: 12/29/2023                The patient's care was discussed with the Attending " Physician, Dr. Rivas .      Jose Bray MD  Hospitalist Service  [facility]  Securely message with [place] (more info)  Text page via [facility] Paging/Directory   ______________________________________________________________________    Chief Complaint   Right hip pain    History is obtained from the patient and family member    History of Present Illness   Ree May is a 78 year old female admitted on 12/27/2023. She has a history of uncontrolled T2DM, HTN, HLD and is admitted for right hip fracture after a fall.     Patient fell during the middle of the night (12/26-12/27) onto her buttocks and immediately had right hip pain. She did not hit her head but was unable to get up afterwards. Unwitnessed fall. Could not sleep due to pain. Went to clinic today and then came to ED. Aspirin daily but no blood thinners.     Uses cane at baseline. No obvious signs of bleeding: no hematemesis, bright blood per rectum, or melanotic stool. Does have a large bruise on her inner right thigh.    Patient denies headache, fever, chills, shortness of breath, difficulty breathing, chest pain, nausea, vomiting, change in urination.    Lives with family and uses a walker at baseline. No alcohol, tobacco, or recreational drug use.     Past Medical History    History reviewed. No pertinent past medical history.    Past Surgical History   Past Surgical History:   Procedure Laterality Date    BREAST CYST ASPIRATION Left     left breast abcess drained       Prior to Admission Medications   Prior to Admission Medications   Prescriptions Last Dose Informant Patient Reported? Taking?   atorvastatin (LIPITOR) 20 MG tablet 12/25/2023 Pharmacy Yes Yes   Sig: Take 20 mg by mouth daily   blood glucose (NO BRAND SPECIFIED) lancets standard   No No   Sig: Use to test blood sugar 2 times daily or as directed.   blood glucose (NO BRAND SPECIFIED) test strip   No No   Sig: Use to test blood sugar 2 times daily or as directed.    blood sugar diagnostic (ONE TOUCH ULTRA TEST) Strp   Yes No   Sig: [BLOOD SUGAR DIAGNOSTIC (ONE TOUCH ULTRA TEST) STRP] Use As Directed daily. Test one time.   Blue In Vitro      Facility-Administered Medications: None           Physical Exam   Vital Signs: Temp: 98.6  F (37  C) Temp src: Oral BP: 129/66 Pulse: 93   Resp: 27 SpO2: 94 % O2 Device: None (Room air)    Weight: 0 lbs 0 oz  Constitutional: drowsy but easy to arouse, cooperative, no apparent distress, and appears stated age  Eyes: Lids and lashes normal, pupils equal, round, extra ocular muscles intact, sclera clear, conjunctiva normal  ENT: Normocephalic, without obvious abnormality, atraumatic, external ears without lesions, oral pharynx with moist mucous membranes and without pallor, tonsils without erythema or exudates,   Hematologic / Lymphatic: no cervical lymphadenopathy  Respiratory: No increased work of breathing, good air exchange, clear to auscultation bilaterally, no crackles or wheezing  Cardiovascular:  regular rate and rhythm, normal S1 and S2, and no murmur noted  GI: Soft, non-distended, mild tenderness in the RLQ, no masses palpated  Skin: no rashes or erythema. Does have a large ecchymosis of the medial right thigh extended from grown half way down to knee and covering the medial half of the thigh.   Musculoskeletal: Clear pain of the right hip. Did not evaluate due to acute pain and plan for OR. No lower extremity swelling or mass behind knees bilaterally.  Neurologic: Awake, alert, oriented.  Cranial nerves II-XII are grossly intact.   Neuropsychiatric: General: normal, calm and normal eye contact  Level of consciousness: Drowsy but easy to arouse.   Affect: normal      Medical Decision Making       Please see A&P for additional details of medical decision making.      Data   ------------------------- PAST 24 HR DATA REVIEWED -----------------------------------------------    I have personally reviewed the following data over the  past 24 hrs:    10.5  \   9.3 (L)   / N/A     134 (L) 98 18.8 /  339 (H)   4.5 22 0.77 \     Procal: 0.24 CRP: N/A Lactic Acid: 1.4       INR:  1.06 PTT:  24   D-dimer:  N/A Fibrinogen:  N/A       Imaging results reviewed over the past 24 hrs:   Recent Results (from the past 24 hour(s))   XR Hip Right 2-3 Views    Narrative    EXAM: XR HIP RIGHT 2-3 VIEWS  LOCATION: [facility]  DATE: 12/27/2023    INDICATION:  Fall down stairs, initial encounter  COMPARISON: None.      Impression    IMPRESSION: Acute moderately comminuted intratrochanteric fracture of the proximal right femur. Moderate fracture displacement and angulation. The femoral neck remains intact. The femoral head remains intact and normally aligned. No significant arthritic   changes in the right hip.   XR Knee Right 1/2 Views    Narrative    EXAM: XR KNEE RIGHT 1/2 VIEWS  LOCATION: [facility]  DATE: 12/27/2023    INDICATION:  Fall down stairs, initial encounter  COMPARISON: None.      Impression    IMPRESSION: Single lateral view of the right knee only. Severe tricompartmental degenerative arthritic changes. No evidence of acute fracture. No convincing joint malalignment on the single view. No significant knee joint effusion.   CT Head w/o Contrast    Narrative    EXAM: CT HEAD W/O CONTRAST  LOCATION: [facility]  DATE: 12/27/2023    INDICATION: fall age 78 distracting injury dementia, trauma, injury.  COMPARISON: Brain MRI 01/17/2014  TECHNIQUE: Routine CT Head without IV contrast. Multiplanar reformats. Dose reduction techniques were used.    FINDINGS:  INTRACRANIAL CONTENTS: No intracranial hemorrhage. Mild diffuse cerebral parenchymal volume loss. No midline shift. The basilar cisterns are patent. No cerebellar tonsillar ectopia. Mild periventricular and scattered foci of deep white matter   hypodensities involving both cerebral hemispheres. No CT evidence for an acute  infarct.    VISUALIZED ORBITS/SINUSES/MASTOIDS: No intraorbital abnormality. Mild mucosal thickening of ethmoid air cells. Small chronic mucosal thickening of the left sphenoid sinus. No middle ear or mastoid effusion.    BONES/SOFT TISSUES: No acute abnormality.      Impression    IMPRESSION:  1.  No intracranial hemorrhage, mass lesions, hydrocephalus or CT evidence for an acute infarct.  2.  Mild diffuse cerebral parenchymal volume loss. Chronic hypertensive/microvascular ischemic white matter changes.   CT Cervical Spine w/o Contrast    Narrative    EXAM: CT CERVICAL SPINE W/O CONTRAST  LOCATION: [facility]  DATE: 12/27/2023    INDICATION: fall distracting injury dementia, trauma, injury.  COMPARISON: None.  TECHNIQUE: Routine CT Cervical Spine without IV contrast. Multiplanar reformats. Dose reduction techniques were used.    FINDINGS:  VERTEBRA: Mild reversal of the normal cervical lordosis. Normal sagittal alignment. Craniocervical junction is within normal limits. Vertebral body heights are maintained. Small well-corticated osseous defect involving the anterior endplates from C4 to   C7. No fractures.    CANAL/FORAMINA: Intervertebral disc spaces are within normal limits. Mild spinal canal narrowing at C2-C3. Mild to moderate spinal canal narrowing at C3-C4. Moderate severe right neuroforaminal narrowing at C3-C4. Mild right neuroforaminal narrowing at   C5-C6.    PARASPINAL: No extraspinal abnormality.      Impression    IMPRESSION:  1.  Mild reversal of the normal cervical lordosis, nonspecific. It can be seen in muscle spasm.  2.  No fracture or posttraumatic subluxation.     XR Chest Port 1 View    Narrative    EXAM: XR CHEST PORT 1 VIEW  LOCATION: [facility]  DATE: 12/27/2023    INDICATION: Preoperative exam.  COMPARISON: None.      Impression    IMPRESSION: Negative chest.   XR Femur Right 2 Views    Narrative    EXAM: XR FEMUR RIGHT 2 VIEWS  LOCATION:  "[facility]  DATE: 12/27/2023    INDICATION: Right hip pain.  COMPARISON: None.      Impression    IMPRESSION: Acute moderately displaced intertrochanteric fracture of the right femur with mild resulting varus angulation. Advanced degenerative changes in the medial compartment of the right knee.   XR Pelvis 1/2 Views    Narrative    EXAM: XR PELVIS 1/2 VIEWS  LOCATION: [facility]  DATE: 12/27/2023    INDICATION: Right hip pain.  COMPARISON: None.      Impression    IMPRESSION: Acute moderately displaced intertrochanteric fracture of the right femur with superior displacement of the femoral shaft. Osteopenia.   XR Knee Right 1/2 Views    Narrative    EXAM: XR KNEE RIGHT 1/2 VIEWS  LOCATION: [facility]  DATE: 12/27/2023    INDICATION: Right knee pain.  COMPARISON: None.      Impression    IMPRESSION: No fracture. Advanced degenerative changes in the medial compartment and mild degenerative changes in the lateral compartment. Osteopenia. Small effusion.   POC US Guidance Needle Placement    Narrative    Ultrasound was performed as guidance to an anesthesia procedure.  Click   \"PACS images\" hyperlink below to view any stored images.  For specific   procedure details, view procedure note authored by anesthesia.     "

## 2023-12-27 NOTE — OR NURSING
Patient admitted from the ED with blood glucose of 494 and then 339 mg/dL. Per report, ED RN did not treat this blood sugar. Upon admission to [facility], blood glucose was 306 mg/dL and this was communicated to Dr. Bustillos who said to recheck in 30 minutes as it seems it is trending down without intervention.

## 2023-12-27 NOTE — ED NOTES
Bed: [facility]  Expected date:   Expected time:   Means of arrival: Ambulance  Comments:  SPF: Hip pain, fall

## 2023-12-27 NOTE — ANESTHESIA PROCEDURE NOTES
"Intrathecal injection Procedure Note    Pre-Procedure   Staff -        Anesthesiologist:  Sunny Guajardo MD       Performed By: anesthesiologist       Location: OR       Procedure Start/Stop Times: 12/27/2023 4:21 PM and 12/27/2023 4:26 PM       Pre-Anesthestic Checklist: patient identified, IV checked, risks and benefits discussed, informed consent, monitors and equipment checked and pre-op evaluation  Timeout:       Correct Patient: Yes        Correct Procedure: Yes        Correct Site: Yes        Correct Position: Yes   Procedure Documentation  Procedure: intrathecal injection       Patient Position: sitting       Patient Prep/Sterile Barriers: sterile gloves, mask, patient draped       Skin prep: Chloraprep       Insertion Site: L3-4. (left paramedian approach).       Needle Gauge: 24.        Needle Length (Inches): 4        Spinal Needle Type: Pencan       Introducer used       Introducer: 20 G       # of attempts: 1 and  # of redirects:  3  Medication(s) Administered   0.5% Bupivacaine PF (Intrathecal) - Intrathecal   2.2 mL - 12/27/2023 4:26:00 PM  Medication Administration Time: 12/27/2023 4:21 PM      FOR [facility] ([facility] ONLY:   Pain Team Contact information: please page the Pain Team Via [employer]. Search \"Pain\". During daytime hours, please page the attending first. At night please page the resident first.      "

## 2023-12-27 NOTE — PHARMACY-ADMISSION MEDICATION HISTORY
Pharmacist Admission Medication History    Admission medication history is complete. The information provided in this note is only as accurate as the sources available at the time of the update.    Information Source(s): Patient, Family member, Patient's pharmacy, and CareEverywhere/SureScripts via in-person and phone    Pertinent Information: family member (marcus) was able to help verify last dose and supplement/OTC information, called preferred pharmacy to complete medication history    Changes made to PTA medication list:  Added: None  Deleted:   OTCs (grandson reports patient only taking prescription medication) - Tylenol PRN, aspirin, vitamin D, Icy hot cream, dry eye drops  Rx (no fill hx past July, grandson reports patient is only monitoring blood sugar not taking medication for glycemic management) - empagliflozin, ergocalciferol 50,000 units weekly, fluoxetine, gabapentin, lisinopril, metformin  Changed: None    Medication Affordability:  Not including over the counter (OTC) medications, was there a time in the past 3 months when you did not take your medications as prescribed because of cost?: No    Allergies reviewed with patient and updates made in EHR: yes    Medication History Completed By: Nicollette McMann, RPH 12/27/2023 2:33 PM    PTA Med List   Medication Sig Last Dose    atorvastatin (LIPITOR) 20 MG tablet Take 20 mg by mouth daily 12/25/2023

## 2023-12-27 NOTE — CONSULTS
ORTHOPEDIC CONSULTATION    Consultation  Claudia May  1945, MRN [id number]    Fall, initial encounter [W19.XXXA]  Hip fracture, right, closed, initial encounter (H) [S72.001A]    PCP: Stacey Morgan, [phone]   Code status:  Full Code       Extended Emergency Contact Information  Primary Emergency Contact: Martha Lopez   [facility]  Home Phone: [phone]  Mobile Phone: [phone]  Relation: Other  Secondary Emergency Contact: Lisette Cardoza   [facility]  Home Phone: [phone]  Relation: Other         IMPRESSION:  In review, the patient is a 78 year old-year-old female with past medical history significant for anemia (hgb 9.3 on admission), DM2 (hgb A1c >15 on 2023), HLD, HTN, now with right intertrochanteric femur fracture after ground level fall on 2023.     Preoperatively, the patient was evaluated by Medicine who have commended no further preoperative optimization recommended based on stable chronic medical co morbidities.  However, the patient does have significantly poorly controlled type 2 diabetes and an anemia of relatively unknown etiology that will require further optimization and workup postoperatively.     Preoperatively, I discussed the diagnosis and available treatment options in detail with the patient and family. We discussed the nature of the planned procedure below, risks and benefits, as well as alternatives including nonsurgical management were discussed in detail with the patient and family. I reviewed and discussed the patient's condition and relevant images with the patient and family. We discussed options for further evaluation and treatment, including conservative non-operative management versus surgical intervention.       From a conservative standpoint, the patient can pursue non-operative management, which would include protected weight bearing to the lower extremity, which carries a high risk of morbidity and mortality due to prolonged and diminished  mobilization/ambulation and it's associated complications, which include but are not limited to blood clots (DVT/PE), skin ulcerations and pressure sores, and pneumonia. In the long term, there is also the risk of chronic pain, fracture nonunion, and fracture malunion.     From a surgical standpoint, we discussed closed reduction vs open reduction and internal fixation. We discussed what surgical intervention entails in addition to postoperative recovery and rehabilitation. Given the above findings, which include the patient's age, activity level (ambulatory at home with walker at baseline), fracture location and morphology, utilizing shared decision making we agreed to proceed with surgical fixation of the fracture using closed vs. Open means of reduction. We discussed that this procedure has a high likelihood of providing immediate post-operative mobilization, but that weightbearing status would ultimately be determined after intraoperative assessment of fracture fixation is made. The patient/family understands the indications and possible complications of surgery.     We discussed at length the risks and benefits of closed vs. Open reduction and internal fixation surgery. Our discussion included but was not limited to the risk of pain, bleeding, infection, blood clots (DVT, PE), wound issues, continued chronic pain in the hip, thigh, or knee, difficulty with ambulation, iatrogenic fracture, damage to nearby neurovascular structures,  allergic reaction to implanted components, implant wear, loosening and/or failure of the implants requiring revision, fracture malunion or fracture nonunion. The possibility of intra-operative and/or post-operative medical complications such as anesthesia complications or reactions, respiratory and cardiovascular events, stroke, heart attack and/or death were also discussed.    Additionally, we discussed in detail that she has a significant elevated risk for wound complications  infection, bone healing complications, nonunion, malunion, and overall complication profile given her poorly controlled type 2 diabetes.  Additionally, we discussed the plan to likely transfuse her perioperatively given her acute hemoglobin drop from 13.4 last week to 9.3 this week.     The patient/family demonstrated an understanding of these risks as well as the potential benefits of fracture fixation surgery which would include possible improvement in pain and earlier ambulation/mobilization. The patient demonstrated an understanding of the indications of surgery and all possible complications, and together we have decided to proceed with surgery. Specific details of the surgical procedure, hospitalization, recovery, rehabilitation, and long-term precautions were also presented. The final choices will be made at the time of procedure to match the anatomy and condition of the bone, ligaments, tendons, and muscles. All of patients questions were answered preoperatively at which time informed consent was taken.     PLAN:    Plan for right femur trochanteric nail fixation 12/27/2023.  NPO for OR today    Thank you for including [facility] in the care of Claudia Mckeon. It has been a pleasure participating in her care.    CHIEF COMPLAINT: Hip fracture, right, closed, initial encounter (H)    HISTORY OF PRESENT ILLNESS:  Linda Taylor is an 78 year old female with past medical history notable for anemia (hgb 9.3 on admission), DM2 (hgb A1c >15 on 12/19/2023), HLD, HTN who had a ground-level fall at home overnight last night.  She was unable to ambulate and was brought to the emergency department where she was diagnosed with a right intertrochanteric femur fracture.    The patient's grandson was also present for the visit, Waldo Hankins, who provided some language assistance.    She reports right hip pain with any movement.  She denies any normal hip pain over the past few weeks.  She does describe some generalized bodyaches  and pains over the past few weeks though.    She lives at home in [place] with a number of family members, including her grandson who is with her today.  They have some stairs at home that she does regularly.  She uses a walker typically around the house though.  She occasionally gets out into the community with a walker.    No prior right hip surgeries reported.      ALLERGIES:   Review of patient's allergies indicates   Allergies   Allergen Reactions    Ibuprofen Rash         MEDICATIONS UPON ADMISSION:  Medications were reviewed.  They include:   Medications Prior to Admission   Medication Sig Dispense Refill Last Dose    atorvastatin (LIPITOR) 20 MG tablet Take 20 mg by mouth daily   12/25/2023    blood glucose (NO BRAND SPECIFIED) lancets standard Use to test blood sugar 2 times daily or as directed. 100 each 11     blood glucose (NO BRAND SPECIFIED) test strip Use to test blood sugar 2 times daily or as directed. 100 strip 11     blood sugar diagnostic (ONE TOUCH ULTRA TEST) Strp [BLOOD SUGAR DIAGNOSTIC (ONE TOUCH ULTRA TEST) STRP] Use As Directed daily. Test one time.   Blue In Vitro          SOCIAL HISTORY:   she  reports that she has never smoked. She has never been exposed to tobacco smoke. She has never used smokeless tobacco.      FAMILY HISTORY:  family history is not on file.      REVIEW OF SYSTEMS:   Reviewed with patient. See HPI, otherwise negative       PHYSICAL EXAMINATION:  Vitals: Temp:  [98.3  F (36.8  C)-98.6  F (37  C)] 98.3  F (36.8  C)  Pulse:  [91-96] 93  Resp:  [18-29] 22  BP: (102-129)/(51-74) 123/68  SpO2:  [93 %-99 %] 99 %  General: resting in bed  Respiratory: non labored breathing on room air  Cardiovascular: palpable distal pulses  Right Lower Extremity:  - Extremity held in a shortened and externally rotated position  - No rashes, erythema, old scars around visualized extremity  - Pain reproduced at hip with gentle log roll of extremity  - Fires ehl, fhl, gsc ta  - SILT  dorsal/plantar foot  - Palpable dp pulse  - No tenderness at distal thigh, knee, leg, ankle, foot    RADIOGRAPHIC EVALUATION:  Personally reviewed:     AP Pelvis, AP/Lateral Femur, AP/Lateral Knee XR from 12/27/2023  -Displaced right intertrochanteric femur fracture with discrete posterior medial calcar, basicervical femoral neck and greater trochanteric fragments.  No distal hardware or fracture visualized.  Patient does have severe medial compartment joint space narrowing of the right knee with large osteophyte formation.     PERTINENT LABS:  Lab Results: personally reviewed.     Hgb 9.3  Hematocrit 27.9  WBC 10.5  Plt 220 (last week, platelets clumped today)  INR clotted result today  Cr 0.77  K 4.5  Na 134  Ca 8.7      Eugenio Waldron MD  [facility]  Date: 12/27/2023  Time: 3:50 PM    CC1:   French Rivas MD    CC2:   Stacey Morgan

## 2023-12-27 NOTE — ANESTHESIA PROCEDURE NOTES
Fascia iliaca Procedure Note    Pre-Procedure   Staff -        Anesthesiologist:  Jacobo Lozano MD       Performed By: anesthesiologist       Location: pre-op       Procedure Start/Stop Times: 12/27/2023 3:56 PM and 12/27/2023 4:00 PM       Pre-Anesthestic Checklist: patient identified, IV checked, site marked, risks and benefits discussed, informed consent, monitors and equipment checked, pre-op evaluation, at physician/surgeon's request and post-op pain management  Timeout:       Correct Patient: Yes        Correct Procedure: Yes        Correct Site: Yes        Correct Position: Yes        Correct Laterality: Yes        Site Marked: Yes  Procedure Documentation  Procedure: Fascia iliaca       Laterality: right       Patient Position: supine       Patient Prep/Sterile Barriers: sterile gloves, mask       Skin prep: Chloraprep       Needle Type: Touhy needle       Needle Gauge: 20.        Needle Length (Inches): 4        Ultrasound guided       1. Ultrasound was used to identify targeted nerve, plexus, vascular marker, or fascial plane and place a needle adjacent to it in real-time.       2. Ultrasound was used to visualize the spread of anesthetic in close proximity to the above referenced structure.       3. A permanent image is entered into the patient's record.       4. The visualized anatomic structures appeared normal.       5. There were no apparent abnormal pathologic findings.    Assessment/Narrative         The placement was negative for: blood aspirated, painful injection and site bleeding       Paresthesias: No.       Bolus given via needle..        Secured via.        Insertion/Infusion Method: Single Shot       Complications: none    Medication(s) Administered   Bupivacaine 0.25% PF (Infiltration) - Infiltration   40 mL - 12/27/2023 3:56:00 PM  Medication Administration Time: 12/27/2023 3:56 PM      FOR [facility] [place]:   Pain Team Contact information: please page the Pain Team Via [employer].  "Search \"Pain\". During daytime hours, please page the attending first. At night please page the resident first.      "

## 2023-12-27 NOTE — ANESTHESIA PREPROCEDURE EVALUATION
Anesthesia Pre-Procedure Evaluation    Patient: Claudia Mckeon   MRN: [id number] : 1945        Procedure : Procedure(s):  INTERNAL FIXATION, FRACTURE, TROCHANTERIC, HIP, USING PINS OR RODS          History reviewed. No pertinent past medical history.   Past Surgical History:   Procedure Laterality Date    BREAST CYST ASPIRATION Left     left breast abcess drained      Allergies   Allergen Reactions    Ibuprofen Rash      Social History     Tobacco Use    Smoking status: Never     Passive exposure: Never    Smokeless tobacco: Never   Substance Use Topics    Alcohol use: Not on file      Wt Readings from Last 1 Encounters:   23 51.6 kg (113 lb 12.1 oz)        Anesthesia Evaluation   Pt has not had prior anesthetic         ROS/MED HX  ENT/Pulmonary:  - neg pulmonary ROS     Neurologic:     (+)   dementia,                             Cardiovascular:     (+) Dyslipidemia - -   -  - -                                      METS/Exercise Tolerance: >4 METS    Hematologic:  - neg hematologic  ROS     Musculoskeletal:   (+)  arthritis,             GI/Hepatic:     (+) GERD,                   Renal/Genitourinary:  - neg Renal ROS     Endo:     (+)  type II DM,                    Psychiatric/Substance Use:  - neg psychiatric ROS     Infectious Disease:  - neg infectious disease ROS     Malignancy:  - neg malignancy ROS     Other:  - neg other ROS          Physical Exam    Airway  airway exam normal      Mallampati: II   TM distance: > 3 FB   Neck ROM: full   Mouth opening: > 3 cm    Respiratory Devices and Support         Dental  no notable dental history     (+) Edentulous      Cardiovascular   cardiovascular exam normal          Pulmonary   pulmonary exam normal                OUTSIDE LABS:  CBC:   Lab Results   Component Value Date    WBC 10.5 2023    WBC 6.5 2023    HGB 9.3 (L) 2023    HGB 13.4 2023    HCT 27.9 (L) 2023    HCT 40.9 2023    PLT  2023      Comment:      Platelets  "Clumped-Platelet Count Not Available     12/20/2023     BMP:   Lab Results   Component Value Date     (L) 12/27/2023     (L) 12/20/2023    POTASSIUM 4.5 12/27/2023    POTASSIUM 4.4 12/20/2023    CHLORIDE 98 12/27/2023    CHLORIDE 95 (L) 12/20/2023    CO2 22 12/27/2023    CO2 26 12/20/2023    BUN 18.8 12/27/2023    BUN 19.7 12/20/2023    CR 0.77 12/27/2023    CR 0.85 12/20/2023     (H) 12/27/2023     (H) 12/20/2023     COAGS:   Lab Results   Component Value Date    PTT 24 12/27/2023    INR 1.06 12/27/2023     POC: No results found for: \"BGM\", \"HCG\", \"HCGS\"  HEPATIC:   Lab Results   Component Value Date    ALBUMIN 4.2 12/19/2023    PROTTOTAL 7.9 12/19/2023    ALT 19 12/19/2023    AST 18 12/19/2023    ALKPHOS 144 12/19/2023    BILITOTAL 0.4 12/19/2023     OTHER:   Lab Results   Component Value Date    LACT 1.4 12/27/2023    A1C >15.0 (H) 12/19/2023    OMARI 8.7 (L) 12/27/2023       Anesthesia Plan    ASA Status:  3    NPO Status:  NPO Appropriate    Anesthesia Type: Spinal.              Consents    Anesthesia Plan(s) and associated risks, benefits, and realistic alternatives discussed. Questions answered and patient/representative(s) expressed understanding.     - Discussed:     - Discussed with:  Patient, Other (See Comment),  (Discussed with marcus, who was  per patient request)      - Extended Intubation/Ventilatory Support Discussed: No.      - Patient is DNR/DNI Status: No     Use of blood products discussed: No .     Postoperative Care    Pain management: Multi-modal analgesia, Peripheral nerve block (Single Shot).   PONV prophylaxis: Ondansetron (or other 5HT-3), Dexamethasone or Solumedrol, Background Propofol Infusion     Comments:               Jacobo Lozano MD    I have reviewed the pertinent notes and labs in the chart from the past 30 days and (re)examined the patient.  Any updates or changes from those notes are reflected in this note.     # " Hyponatremia: Lowest Na = 132 mmol/L in last 30 days, will monitor as appropriate          # DMII: A1C = >15.0 % (Ref range: 0.0 - 5.6 %) within past 6 months

## 2023-12-27 NOTE — H&P (VIEW-ONLY)
EMERGENCY DEPARTMENT ENCOUNTER      NAME: Claudia May  AGE: 78 year old female  YOB: 1945  MRN: [id number]  EVALUATION DATE & TIME: 12/27/2023 11:33 AM    PCP: Stacey Morgan    ED PROVIDER: Donna Grider M.D.      Chief Complaint   Patient presents with    Hip Pain         FINAL IMPRESSION:  1. Hip fracture, right, closed, initial encounter (H)    2. Fall, initial encounter          ED COURSE & MEDICAL DECISION MAKING:    ED Course as of 12/27/23 1331   Wed Dec 27, 2023   1144 Pt sent to the ED by [facility] resident who I spoke wtih with right hip pain after fall last night onto buttocks, neuro intact but right hip painful with right leg shortened and externally rotated and outpatient XR with right femoral neck fracture, amenable to CT head and c-spine after I spoke with her and daughter who is helping to translate Steven with her consent,  pending preop labs and EKG/XR in patient who ambulates normaly with cane, fentanyl given for pain   1327 Pt endorsed to [facility] resident to med surg   1330 I spoke with [facility] who will see patient shortly while admitted, wants continue NPO in case can do procedure today       Pertinent Labs & Imaging studies reviewed. (See chart for details)    N95 worn  A face shield was worn also  COVID PPE    Medical Decision Making    History:  Supplemental history from: Documented in chart, if applicable  External Record(s) reviewed: Documented in chart, if applicable.    Work Up:  Chart documentation includes differential considered and any EKGs or imaging independently interpreted by provider, where specified.  In additional to work up documented, I considered the following work up: Documented in chart, if applicable.    External consultation:  Discussion of management with another provider: Documented in chart, if applicable and Hospitalist and Orthopedics    Complicating factors:  Care impacted by chronic illness: Dementia, Diabetes, Hyperlipidemia, and Hypertension  Care  affected by social determinants of health: N/A    Disposition considerations: Admit.        At the conclusion of the encounter I discussed the results of all of the tests and the disposition. The questions were answered. The patient or family acknowledged understanding and was agreeable with the care plan.     MEDICATIONS GIVEN IN THE EMERGENCY:  Medications   fentaNYL (PF) (SUBLIMAZE) injection 100 mcg (100 mcg Intravenous $Given 12/27/23 2359)   sodium chloride 0.9% BOLUS 500 mL (0 mLs Intravenous Stopped 12/27/23 1329)       NEW PRESCRIPTIONS STARTED AT TODAY'S ER VISIT  New Prescriptions    No medications on file          =================================================================    HPI      Claudia Mckeon is a 78 year old female with PMHx of dementia, HTN, HLD, frequent falls, T2DM, and recurrent UTIs, who presents to the ED today via EMS with injuries sustained during a fall.    Per chart review, the patient was seen earlier today at [facility] for a follow up appointment. During this visit, the patient had an XR hip that showed a right hip fracture. Patient was referred to the ED for evaluation and admission.     Last night at 0100, the patient was attempting to walk down the stairs in her house. She fell onto her buttocks and was able to scoot down the stairs to call for help. She was unable to get up on her own. Patient endorses right hip pain. She did not hit her head during the fall. Fall was unwitnessed. This morning, the patient had a scheduled appointment at her clinic. When her family brought her, the clinic referred them to the ED for further evaluation. Patient ambulatory with a cane at baseline. She does not have neck or back pain at this time. Patient is on aspirin, but no other blood thinning medications. She took tylenol this morning, but no other pain medications at home. No other complaints at this time.       REVIEW OF SYSTEMS   All other systems reviewed and are negative except as  noted above in HPI.    PAST MEDICAL HISTORY:  No past medical history on file.    PAST SURGICAL HISTORY:  Past Surgical History:   Procedure Laterality Date    BREAST CYST ASPIRATION Left     left breast abcess drained       CURRENT MEDICATIONS:    acetaminophen (TYLENOL) 500 MG tablet  aspirin 81 MG EC tablet  atorvastatin (LIPITOR) 20 MG tablet  blood sugar diagnostic (ONE TOUCH ULTRA TEST) Strp  cholecalciferol, vitamin D3, (VITAMIN D3) 2,000 unit cap  empagliflozin (JARDIANCE) 10 MG TABS tablet  ergocalciferol (ERGOCALCIFEROL) 50,000 unit capsule  FLUoxetine (PROZAC) 20 MG capsule  gabapentin (NEURONTIN) 100 MG capsule  hypromellose-dextran (ARTIFICAL TEARS) 0.1-0.3 % ophthalmic solution  lisinopril (PRINIVIL,ZESTRIL) 5 MG tablet  MEDICATION CANNOT BE REORDERED - PLEASE MANUALLY REORDER AND DISCONTINUE THE OLD ORDER  metFORMIN (GLUCOPHAGE) 500 MG tablet  methyl salicylate-menthol (ICY HOT) 30-10 % Crea  METHYL SALICYLATE/MENTHOL (ICY HOT EXTRA STRENGTH TOP)  peg 400-propylene glycol PF (SYSTANE ULTRA, PF,) 0.4-0.3 % Dpet        ALLERGIES:  Allergies   Allergen Reactions    Ibuprofen Rash       FAMILY HISTORY:  No family history on file.    SOCIAL HISTORY:   Social History     Socioeconomic History    Marital status:    Tobacco Use    Smoking status: Never     Passive exposure: Never    Smokeless tobacco: Never   Social History Narrative    Steven     Social Determinants of Health     Financial Resource Strain: Low Risk  (12/27/2023)    Financial Resource Strain     Within the past 12 months, have you or your family members you live with been unable to get utilities (heat, electricity) when it was really needed?: No   Food Insecurity: Low Risk  (12/27/2023)    Food Insecurity     Within the past 12 months, did you worry that your food would run out before you got money to buy more?: No     Within the past 12 months, did the food you bought just not last and you didn t have money to get more?: No    Transportation Needs: Low Risk  (12/27/2023)    Transportation Needs     Within the past 12 months, has lack of transportation kept you from medical appointments, getting your medicines, non-medical meetings or appointments, work, or from getting things that you need?: No   Interpersonal Safety: Low Risk  (12/19/2023)    Interpersonal Safety     Do you feel physically and emotionally safe where you currently live?: Yes     Within the past 12 months, have you been hit, slapped, kicked or otherwise physically hurt by someone?: No     Within the past 12 months, have you been humiliated or emotionally abused in other ways by your partner or ex-partner?: No   Housing Stability: Low Risk  (12/27/2023)    Housing Stability     Do you have housing? : Yes     Are you worried about losing your housing?: No       VITALS:  Patient Vitals for the past 24 hrs:   BP Temp Temp src Pulse Resp SpO2   12/27/23 1136 102/51 98.6  F (37  C) Oral 93 18 93 %       PHYSICAL EXAM    PHYSICAL EXAM    VITAL SIGNS: /51   Pulse 93   Temp 98.6  F (37  C) (Oral)   Resp 18   SpO2 93%    GENERAL: Awake, alert.  In no acute distress. GCS 15  HEENT: Normocephalic, atraumatic.  Pupils equal, round and reactive.  Conjunctiva normal.  EOMI. No ren sign, no racoon eyes, no mastoid tenderness, no hemotympanum, no facial instability, no nasal bridge pain, no nasal septal hematoma, no intraoral lacerations, no loose teeth, no mandible pain or deformity  NECK: No stridor or apparent deformity. No midline pain to palpation.  PULMONARY: Symmetrical breath sounds without distress.  Lungs clear to auscultation bilaterally without wheezes, rhonchi or rales.  CARDIO: Regular rate and rhythm.  No significant murmur, rub or gallop.  Radial pulses strong and symmetrical.  THORAX: No focal chest wall deformity or crepitus  BACK: No focal tenderness or deformity to each vertebral level in midline  ABDOMINAL: Abdomen soft, non-distended and non-tender to  palpation.  No CVAT, BL.  EXTREMITIES: No lower extremity swelling or edema. Pelvis stable and without focal tenderness. Bilateral pedal pulses 2+ and equal. Right hip tenderness. Right lower extremity shortened and externally rotated.   NEURO: Alert and oriented to person, place and time.  Cranial nerves grossly intact.  No focal motor deficit. Sensation globally intact.  PSYCH: Normal mood and affect  SKIN: No rashes    LAB:  All pertinent labs reviewed and interpreted.  Results for orders placed or performed during the hospital encounter of 12/27/23   Result Value Ref Range    INR     Basic metabolic panel   Result Value Ref Range    Sodium 134 (L) 135 - 145 mmol/L    Potassium 4.5 3.4 - 5.3 mmol/L    Chloride 98 98 - 107 mmol/L    Carbon Dioxide (CO2) 22 22 - 29 mmol/L    Anion Gap 14 7 - 15 mmol/L    Urea Nitrogen 18.8 8.0 - 23.0 mg/dL    Creatinine 0.77 0.51 - 0.95 mg/dL    GFR Estimate 78 >60 mL/min/1.73m2    Calcium 8.7 (L) 8.8 - 10.2 mg/dL    Glucose 339 (H) 70 - 99 mg/dL   Lactic acid whole blood   Result Value Ref Range    Lactic Acid 1.4 0.7 - 2.0 mmol/L   Result Value Ref Range    Procalcitonin 0.24 <0.50 ng/mL   Ethyl Alcohol Level   Result Value Ref Range    Alcohol ethyl <0.01 <=0.01 g/dL   Symptomatic Influenza A/B, RSV, & SARS-CoV2 PCR (COVID-19) Nasopharyngeal    Specimen: Nasopharyngeal; Swab   Result Value Ref Range    Influenza A PCR Negative Negative    Influenza B PCR Negative Negative    RSV PCR Negative Negative    SARS CoV2 PCR Negative Negative   CBC with platelets and differential   Result Value Ref Range    WBC Count 10.5 4.0 - 11.0 10e3/uL    RBC Count 3.22 (L) 3.80 - 5.20 10e6/uL    Hemoglobin 9.3 (L) 11.7 - 15.7 g/dL    Hematocrit 27.9 (L) 35.0 - 47.0 %    MCV 87 78 - 100 fL    MCH 28.9 26.5 - 33.0 pg    MCHC 33.3 31.5 - 36.5 g/dL    RDW 12.5 10.0 - 15.0 %    Platelet Count      % Neutrophils 55 %    % Lymphocytes 33 %    % Monocytes 9 %    % Eosinophils 1 %    % Basophils 1 %    %  Immature Granulocytes 1 %    NRBCs per 100 WBC 0 <1 /100    Absolute Neutrophils 6.0 1.6 - 8.3 10e3/uL    Absolute Lymphocytes 3.4 0.8 - 5.3 10e3/uL    Absolute Monocytes 0.9 0.0 - 1.3 10e3/uL    Absolute Eosinophils 0.1 0.0 - 0.7 10e3/uL    Absolute Basophils 0.1 0.0 - 0.2 10e3/uL    Absolute Immature Granulocytes 0.1 <=0.4 10e3/uL    Absolute NRBCs 0.0 10e3/uL   Adult Type and Screen   Result Value Ref Range    ABO/RH(D) B POS     SPECIMEN EXPIRATION DATE [id number]        RADIOLOGY:  Reviewed all pertinent imaging. Please see official radiology report.  XR Chest Port 1 View    (Results Pending)   XR Pelvis and Hip Right 2 Views    (Results Pending)   XR Knee Right 3 Views    (Results Pending)   XR Femur Right 2 Views    (Results Pending)   CT Head w/o Contrast    (Results Pending)   CT Cervical Spine w/o Contrast    (Results Pending)         EKG:    Reviewed and interpreted as:   Time: 12/27/23 1216  Rate: 95 bpm  Sinus rhythm  No ST abnormalities  No prior ECGs available for comparison.       I have independently reviewed and interpreted the EKG(s) documented above.        I, Sherley Newsome, am serving as a scribe to document services personally performed by Dr. Donna Grider based on my observation and the provider's statements to me. I, Donna Grider MD attest that Sherley Newsome is acting in a scribe capacity, has observed my performance of the services and has documented them in accordance with my direction.       Donna Grider MD  12/27/23 4999

## 2023-12-27 NOTE — ED TRIAGE NOTES
"Patient brought by EMS came from [facility], [place], per report had a fall yesterday \" last step of the stair and landed on bottom\" c/o right hip pain, not on blood thinner.  Blood sugar 376        "

## 2023-12-28 ENCOUNTER — APPOINTMENT (OUTPATIENT)
Dept: PHYSICAL THERAPY | Facility: HOSPITAL | Age: 78
DRG: 481 | End: 2023-12-28
Payer: COMMERCIAL

## 2023-12-28 ENCOUNTER — APPOINTMENT (OUTPATIENT)
Dept: OCCUPATIONAL THERAPY | Facility: HOSPITAL | Age: 78
DRG: 481 | End: 2023-12-28
Payer: COMMERCIAL

## 2023-12-28 LAB
ANION GAP SERPL CALCULATED.3IONS-SCNC: 8 MMOL/L (ref 7–15)
ATRIAL RATE - MUSE: 95 BPM
BUN SERPL-MCNC: 13.9 MG/DL (ref 8–23)
CALCIUM SERPL-MCNC: 8.5 MG/DL (ref 8.8–10.2)
CHLORIDE SERPL-SCNC: 104 MMOL/L (ref 98–107)
CREAT SERPL-MCNC: 0.76 MG/DL (ref 0.51–0.95)
DEPRECATED HCO3 PLAS-SCNC: 27 MMOL/L (ref 22–29)
DIASTOLIC BLOOD PRESSURE - MUSE: 51 MMHG
EGFRCR SERPLBLD CKD-EPI 2021: 79 ML/MIN/1.73M2
ERYTHROCYTE [DISTWIDTH] IN BLOOD BY AUTOMATED COUNT: 13.2 % (ref 10–15)
GLUCOSE BLDC GLUCOMTR-MCNC: 150 MG/DL (ref 70–99)
GLUCOSE BLDC GLUCOMTR-MCNC: 170 MG/DL (ref 70–99)
GLUCOSE BLDC GLUCOMTR-MCNC: 214 MG/DL (ref 70–99)
GLUCOSE BLDC GLUCOMTR-MCNC: 255 MG/DL (ref 70–99)
GLUCOSE BLDC GLUCOMTR-MCNC: 261 MG/DL (ref 70–99)
GLUCOSE SERPL-MCNC: 190 MG/DL (ref 70–99)
HCT VFR BLD AUTO: 27.3 % (ref 35–47)
HGB BLD-MCNC: 8.9 G/DL (ref 11.7–15.7)
INTERPRETATION ECG - MUSE: NORMAL
MCH RBC QN AUTO: 28.5 PG (ref 26.5–33)
MCHC RBC AUTO-ENTMCNC: 32.6 G/DL (ref 31.5–36.5)
MCV RBC AUTO: 88 FL (ref 78–100)
P AXIS - MUSE: 34 DEGREES
PLATELET # BLD AUTO: 190 10E3/UL (ref 150–450)
POTASSIUM SERPL-SCNC: 3.9 MMOL/L (ref 3.4–5.3)
PR INTERVAL - MUSE: 126 MS
QRS DURATION - MUSE: 82 MS
QT - MUSE: 352 MS
QTC - MUSE: 442 MS
R AXIS - MUSE: 52 DEGREES
RBC # BLD AUTO: 3.12 10E6/UL (ref 3.8–5.2)
SODIUM SERPL-SCNC: 139 MMOL/L (ref 135–145)
SYSTOLIC BLOOD PRESSURE - MUSE: 102 MMHG
T AXIS - MUSE: 38 DEGREES
VENTRICULAR RATE- MUSE: 95 BPM
WBC # BLD AUTO: 9 10E3/UL (ref 4–11)

## 2023-12-28 PROCEDURE — 250N000013 HC RX MED GY IP 250 OP 250 PS 637: Performed by: STUDENT IN AN ORGANIZED HEALTH CARE EDUCATION/TRAINING PROGRAM

## 2023-12-28 PROCEDURE — 97535 SELF CARE MNGMENT TRAINING: CPT | Mod: GO

## 2023-12-28 PROCEDURE — 250N000012 HC RX MED GY IP 250 OP 636 PS 637

## 2023-12-28 PROCEDURE — 85027 COMPLETE CBC AUTOMATED: CPT

## 2023-12-28 PROCEDURE — 99232 SBSQ HOSP IP/OBS MODERATE 35: CPT | Mod: GC

## 2023-12-28 PROCEDURE — 80048 BASIC METABOLIC PNL TOTAL CA: CPT

## 2023-12-28 PROCEDURE — 36415 COLL VENOUS BLD VENIPUNCTURE: CPT

## 2023-12-28 PROCEDURE — 97530 THERAPEUTIC ACTIVITIES: CPT | Mod: GP

## 2023-12-28 PROCEDURE — 97166 OT EVAL MOD COMPLEX 45 MIN: CPT | Mod: GO

## 2023-12-28 PROCEDURE — 97161 PT EVAL LOW COMPLEX 20 MIN: CPT | Mod: GP

## 2023-12-28 PROCEDURE — 120N000001 HC R&B MED SURG/OB

## 2023-12-28 PROCEDURE — 250N000013 HC RX MED GY IP 250 OP 250 PS 637

## 2023-12-28 PROCEDURE — 250N000011 HC RX IP 250 OP 636: Performed by: STUDENT IN AN ORGANIZED HEALTH CARE EDUCATION/TRAINING PROGRAM

## 2023-12-28 RX ORDER — ENOXAPARIN SODIUM 100 MG/ML
40 INJECTION SUBCUTANEOUS EVERY 24 HOURS
Status: DISCONTINUED | OUTPATIENT
Start: 2023-12-28 | End: 2023-12-28

## 2023-12-28 RX ORDER — OXYCODONE HYDROCHLORIDE 5 MG/1
2.5 TABLET ORAL EVERY 4 HOURS PRN
Qty: 10 TABLET | Refills: 0 | Status: SHIPPED | OUTPATIENT
Start: 2023-12-28 | End: 2024-03-04

## 2023-12-28 RX ADMIN — SENNOSIDES AND DOCUSATE SODIUM 1 TABLET: 50; 8.6 TABLET ORAL at 20:51

## 2023-12-28 RX ADMIN — OXYCODONE HYDROCHLORIDE 5 MG: 5 TABLET ORAL at 20:52

## 2023-12-28 RX ADMIN — INSULIN ASPART 3 UNITS: 100 INJECTION, SOLUTION INTRAVENOUS; SUBCUTANEOUS at 17:13

## 2023-12-28 RX ADMIN — OXYCODONE HYDROCHLORIDE 5 MG: 5 TABLET ORAL at 10:26

## 2023-12-28 RX ADMIN — CEFAZOLIN 1 G: 1 INJECTION, POWDER, FOR SOLUTION INTRAMUSCULAR; INTRAVENOUS at 00:32

## 2023-12-28 RX ADMIN — POLYETHYLENE GLYCOL 3350 17 G: 17 POWDER, FOR SOLUTION ORAL at 08:29

## 2023-12-28 RX ADMIN — INSULIN ASPART 3 UNITS: 100 INJECTION, SOLUTION INTRAVENOUS; SUBCUTANEOUS at 12:04

## 2023-12-28 RX ADMIN — CEFAZOLIN 1 G: 1 INJECTION, POWDER, FOR SOLUTION INTRAMUSCULAR; INTRAVENOUS at 08:27

## 2023-12-28 RX ADMIN — ACETAMINOPHEN 975 MG: 325 TABLET ORAL at 13:11

## 2023-12-28 RX ADMIN — ACETAMINOPHEN 975 MG: 325 TABLET ORAL at 20:52

## 2023-12-28 RX ADMIN — INSULIN ASPART 1 UNITS: 100 INJECTION, SOLUTION INTRAVENOUS; SUBCUTANEOUS at 08:27

## 2023-12-28 RX ADMIN — Medication 81 MG: at 20:50

## 2023-12-28 RX ADMIN — SENNOSIDES AND DOCUSATE SODIUM 1 TABLET: 50; 8.6 TABLET ORAL at 08:29

## 2023-12-28 RX ADMIN — CEFAZOLIN 1 G: 1 INJECTION, POWDER, FOR SOLUTION INTRAMUSCULAR; INTRAVENOUS at 17:17

## 2023-12-28 RX ADMIN — SENNOSIDES AND DOCUSATE SODIUM 1 TABLET: 50; 8.6 TABLET ORAL at 21:00

## 2023-12-28 RX ADMIN — Medication 81 MG: at 08:29

## 2023-12-28 RX ADMIN — ACETAMINOPHEN 975 MG: 325 TABLET ORAL at 04:34

## 2023-12-28 RX ADMIN — POLYETHYLENE GLYCOL 3350 17 G: 17 POWDER, FOR SOLUTION ORAL at 21:00

## 2023-12-28 ASSESSMENT — ACTIVITIES OF DAILY LIVING (ADL)
ADLS_ACUITY_SCORE: 18
ADLS_ACUITY_SCORE: 32
ADLS_ACUITY_SCORE: 18
ADLS_ACUITY_SCORE: 18
ADLS_ACUITY_SCORE: 27
ADLS_ACUITY_SCORE: 18
ADLS_ACUITY_SCORE: 32
ADLS_ACUITY_SCORE: 32

## 2023-12-28 NOTE — PROGRESS NOTES
12/28/23 7765   Appointment Info   Signing Clinician's Name / Credentials (PT) Barbra Azevedo DPT       Present yes   Language Leanne via Vocera - per dtr, pt's dialect different from interpreters making it difficult for pt to understand    Living Environment   People in Home child(oneil), adult   Current Living Arrangements house   Home Accessibility stairs within home   Number of Stairs, Within Home, Primary greater than 10 stairs   Living Environment Comments bedroom on 2nd floor, many steps to get into home   Self-Care   Usual Activity Tolerance moderate   Current Activity Tolerance fair   Equipment Currently Used at Home cane, straight   Fall history within last six months yes   Number of times patient has fallen within last six months 1   Activity/Exercise/Self-Care Comment typically ind w/ ADLs at baseline, gets assist at times from dtr for IADLs   General Information   Onset of Illness/Injury or Date of Surgery 12/27/23   Referring Physician Lelo Sierra PA-C   Patient/Family Therapy Goals Statement (PT) none stated   Pertinent History of Current Problem (include personal factors and/or comorbidities that impact the POC) Patient had a fall around 0100 on 12/27. Uses walker at baseline. She fell on her butt, did not hit her head, no LOC, no blood thinners (baby aspirin only). Immediately had right hip pain and was unable to stand up, had to crawl to ask for help. Went to [facility] 12/27 AM and found to have right hip fracture and sent to [facility] ED. On admission, imaging was obtained which showed an intertrochanteric fracture of the right femur. Operative repair on 12/27, now POD#1 and pain present but improved. ORIF RLE   Existing Precautions/Restrictions fall   Weight-Bearing Status - LLE weight-bearing as tolerated   Weight-Bearing Status - RLE weight-bearing as tolerated   Cognition   Affect/Mental Status (Cognition) WFL   Pain Assessment   Patient  Currently in Pain Yes, see Vital Sign flowsheet  (with movement)   Range of Motion (ROM)   Range of Motion ROM deficits secondary to surgical procedure;ROM deficits secondary to pain;ROM deficits secondary to weakness   Strength (Manual Muscle Testing)   Strength (Manual Muscle Testing) Deficits observed during functional mobility   Bed Mobility   Bed Mobility supine-sit   Supine-Sit Titus (Bed Mobility) moderate assist (50% patient effort)   Impairments Contributing to Impaired Bed Mobility pain;decreased strength   Assistive Device (Bed Mobility) bed rails;draw sheet   Comment, (Bed Mobility) needing inc assist with draw sheet for hips towards EOB, instructed on use of hands on bed rail to pull self towrads EOB   Transfers   Transfers sit-stand transfer   Sit-Stand Transfer   Sit-Stand Titus (Transfers) moderate assist (50% patient effort);2 person assist   Assistive Device (Sit-Stand Transfers) walker, front-wheeled   Comment, (Sit-Stand Transfer) wanting to pull to stand with walker   Gait/Stairs (Locomotion)   Titus Level (Gait) minimum assist (75% patient effort)   Assistive Device (Gait) walker, front-wheeled   Distance in Feet (Gait) 3 steps   Comment, (Gait/Stairs) 3 steps from EOB to chair, does not put weight on RLE d/t inc pain.   Clinical Impression   Criteria for Skilled Therapeutic Intervention Yes, treatment indicated   PT Diagnosis (PT) impaired functional mobility, gait abnormality   Influenced by the following impairments decreased strength, decreased endurance, pain, decreased balance   Functional limitations due to impairments gait, transfers, stairs, bed mob   Clinical Presentation (PT Evaluation Complexity) stable   Clinical Presentation Rationale pt presents as medically diagnosed   Clinical Decision Making (Complexity) low complexity   Planned Therapy Interventions (PT) balance training;bed mobility training;gait training;home exercise program;neuromuscular  re-education;patient/family education;strengthening;transfer training;stair training   Risk & Benefits of therapy have been explained evaluation/treatment results reviewed;patient   PT Total Evaluation Time   PT Eval, Low Complexity Minutes (03844) 10   Physical Therapy Goals   PT Frequency Daily   PT Predicted Duration/Target Date for Goal Attainment 01/04/24   PT Goals Bed Mobility;Transfers;Gait;Stairs   PT: Bed Mobility Supervision/stand-by assist;Supine to/from sit   PT: Transfers Supervision/stand-by assist;Sit to/from stand;Bed to/from chair;Assistive device   PT: Gait Minimal assist;Assistive device;Rolling walker;50 feet   PT: Stairs Moderate assist;4 stairs;Rail on both sides   Interventions   Interventions Quick Adds Therapeutic Activity   Therapeutic Activity   Therapeutic Activities: dynamic activities to improve functional performance Minutes (58972) 10   Symptoms Noted During/After Treatment Increased pain   Treatment Detail/Skilled Intervention additional sit <> stand x 1 with FWW, inc demonstration prior to standing for hand placement and weight bearing. Pt educated on WB orders and that she can be WBAT on RLE. Sit <> stand Elaina x 1 with inc assist and v/c for hand placement. Static standing x 6 minutes total with inc weight shifting L and R, pt reporting inc discomfort. Up in chair following PT with alarm on and call light in hand. Inc time needed throughout for interpretation and instructions   PT Discharge Planning   PT Plan weight shifting before amb, amb w/ FWW and w/c follow, bring short FWW, bed mob. LE ex as able   PT Discharge Recommendation (DC Rec) Transitional Care Facility   PT Rationale for DC Rec currently recommending TCU as pt only able to take a few steps from EOB to chair. Will need TCU to improve strength and endurance needed for functional mobility at home.   PT Brief overview of current status EOB > chair A x 2 with FWW, bed mob modA x 1, sit <> stand Elaina x 1 with FWW   PT  Equipment Needed at Discharge walker, rolling   Total Session Time   Timed Code Treatment Minutes 10   Total Session Time (sum of timed and untimed services) 20

## 2023-12-28 NOTE — ANESTHESIA POSTPROCEDURE EVALUATION
Patient: Ree May    Procedure: Procedure(s):  INTERNAL FIXATION, FRACTURE, TROCHANTERIC, RIGHT HIP, USING RODS       Anesthesia Type:  Spinal    Note:  Disposition: Inpatient   Postop Pain Control: Uneventful            Sign Out: Well controlled pain   PONV: No   Neuro/Psych: Uneventful            Sign Out: Acceptable/Baseline neuro status   Airway/Respiratory: Uneventful            Sign Out: Acceptable/Baseline resp. status   CV/Hemodynamics: Uneventful            Sign Out: Acceptable CV status; No obvious hypovolemia; No obvious fluid overload   Other NRE: NONE   DID A NON-ROUTINE EVENT OCCUR?            Last vitals:  Vitals Value Taken Time   /64 12/27/23 1930   Temp     Pulse 70 12/27/23 1945   Resp 26 12/27/23 1942   SpO2 98 % 12/27/23 1945   Vitals shown include unfiled device data.    Electronically Signed By: Anitra Barton MD  December 27, 2023  9:16 PM  
gradual onset

## 2023-12-28 NOTE — PROGRESS NOTES
"   12/28/23 0745   Appointment Info   Signing Clinician's Name / Credentials (OT) Liza Almonte OTD OTR/L       Present yes   Language Leanne   Living Environment   People in Home child(oneil), adult   Current Living Arrangements house   Home Accessibility stairs within home   Number of Stairs, Within Home, Primary greater than 10 stairs   Living Environment Comments Pt reports bedroom is on the 2nd floor,  has \"many steps\" to get into home   Self-Care   Equipment Currently Used at Home cane, straight   Fall history within last six months yes   Number of times patient has fallen within last six months 1   Activity/Exercise/Self-Care Comment Pt reports typically ind with all ADLs at baseline, gets intermittent assist from daughter for IADLs   General Information   Onset of Illness/Injury or Date of Surgery 12/27/23   Referring Physician French Rivas MD   Patient/Family Therapy Goal Statement (OT) To decrease pain, to walk   Additional Occupational Profile Info/Pertinent History of Current Problem The patient is a 78 year old year-old female with a past medical history of anemia (hgb 9.3 on admission), DM2 (hgb A1c >15 on 12/19/2023), HLD, HTN  who sustained a right intertrochanteric femur fracture with subtrochanteric extension as the result of a ground level fall. After considering the patient's condition and the impact of their injury on the patient's quality of life a closed versus open reduction and surgical fixation procedure was offered to the patient as a reasonable option.   Existing Precautions/Restrictions weight bearing   Right Lower Extremity (Weight-bearing Status) weight-bearing as tolerated (WBAT)   Cognitive Status Examination   Orientation Status orientation to person, place and time   Affect/Mental Status (Cognitive) WFL   Follows Commands repetition of directions required;verbal cues/prompting required   Cognitive Status Comments Pt demos some difficulty with safety awareness " and follow through d/t communication difficulties with , slightly impulsive   Posture   Posture not impaired   Range of Motion Comprehensive   General Range of Motion bilateral upper extremity ROM WNL   Strength Comprehensive (MMT)   General Manual Muscle Testing (MMT) Assessment no strength deficits identified   Bed Mobility   Bed Mobility supine-sit   Supine-Sit [place] (Bed Mobility) moderate assist (50% patient effort)   Assistive Device (Bed Mobility) draw sheet   Transfers   Transfers sit-stand transfer;toilet transfer   Sit-Stand Transfer   Sit-Stand [place] (Transfers) minimum assist (75% patient effort)   Assistive Device (Sit-Stand Transfers) walker, front-wheeled   Toilet Transfer   [place] Level (Toilet Transfer) maximum assist (25% patient effort)   Assistive Device (Toilet Transfer) walker, front-wheeled   Balance   Balance Assessment standing dynamic balance;standing static balance   Standing Balance: Static contact guard   Standing Balance: Dynamic minimal assist   Activities of Daily Living   BADL Assessment/Intervention lower body dressing;grooming;toileting   Lower Body Dressing Assessment/Training   [place] Level (Lower Body Dressing) maximum assist (25% patient effort)   Grooming Assessment/Training   Position (Grooming) supported sitting   [place] Level (Grooming) set up   Toileting   [place] Level (Toileting) maximum assist (25% patient effort)   Clinical Impression   Criteria for Skilled Therapeutic Interventions Met (OT) Yes, treatment indicated   OT Diagnosis Decreased ind with ADLs and safety   Influenced by the following impairments Hip fracture s/p internal fixation   OT Problem List-Impairments impacting ADL activity tolerance impaired;balance;pain;post-surgical precautions;mobility   Assessment of Occupational Performance 3-5 Performance Deficits   Identified Performance Deficits dressing, toileting, bathing, fxl transfers/mobility   Planned  Therapy Interventions (OT) ADL retraining;bed mobility training;progressive activity/exercise;transfer training   Clinical Decision Making Complexity (OT) detailed assessment/moderate complexity   Risk & Benefits of therapy have been explained evaluation/treatment results reviewed;care plan/treatment goals reviewed;risks/benefits reviewed;current/potential barriers reviewed;patient   OT Total Evaluation Time   OT Eval, Moderate Complexity Minutes (09452) 10   OT Goals   Therapy Frequency (OT) Daily   OT Predicted Duration/Target Date for Goal Attainment 01/04/24   OT Goals Hygiene/Grooming;Lower Body Dressing;Toilet Transfer/Toileting   OT: Hygiene/Grooming supervision/stand-by assist;while standing;within precautions   OT: Lower Body Dressing Minimal assist;using adaptive equipment;within precautions   OT: Toilet Transfer/Toileting Supervision/stand-by assist;toilet transfer;cleaning and garment management;within precautions   Interventions   Interventions Quick Adds Self-Care/Home Management   Self-Care/Home Management   Self-Care/Home Mgmt/ADL, Compensatory, Meal Prep Minutes (10170) 12   Symptoms Noted During/After Treatment (Meal Preparation/Planning Training) increased pain   Treatment Detail/Skilled Intervention Evaluation completed, treatment initiated. Pt tolerated standing EOB CGA w/ cueing for hand placement onto FWW ~ 4 minutes. Minimal weight bearing through RLE d/t pain despite cueing. Seated rest break, min A for controlled descent. STS min A from bed, hand over hand assist for safe hand placement onto walker. Pt slightly impulsive attempting to pivot to chair without safe hand placement onto walker. Required max A x 1 at gait belt and walker to advance pt safely to bedside chair. Pt requested TH assist holding RLE to scoot back in chair, pt able to reposition self using UE at arm rests. Left in chair with call light in reach and legs elevated.   OT Discharge Planning   OT Plan Progress  transfers/mobility, toileting, LB dressing with AE as needed, standing tolerance, g/h   OT Discharge Recommendation (DC Rec) Transitional Care Facility   OT Rationale for DC Rec Pt currently requires Ax1 for mobility/transfers and ADLs, recommend TCU to progress ind with ADLs and safety   OT Brief overview of current status CGA STS, max A x 1 transfer to chair   OT Equipment Needed at Discharge walker, standard;shower chair   Total Session Time   Timed Code Treatment Minutes 12   Total Session Time (sum of timed and untimed services) 22

## 2023-12-28 NOTE — PLAN OF CARE
Problem: Adult Inpatient Plan of Care  Goal: Absence of Hospital-Acquired Illness or Injury  Intervention: Identify and Manage Fall Risk  Recent Flowsheet Documentation  Taken 12/27/2023 2021 by Iman Hartman RN  Safety Promotion/Fall Prevention: activity supervised  Intervention: Prevent Skin Injury  Recent Flowsheet Documentation  Taken 12/27/2023 2021 by Iman Hartman RN  Skin Protection: incontinence pads utilized  Device Skin Pressure Protection: absorbent pad utilized/changed  Intervention: Prevent and Manage VTE (Venous Thromboembolism) Risk  Recent Flowsheet Documentation  Taken 12/27/2023 2021 by Iman Hartman RN  VTE Prevention/Management: SCDs (sequential compression devices) on   Goal Outcome Evaluation:  Pt reported pain in the right hip - got PRN Tylenol.  CMS intact in the right leg - she has bruising in the inner thigh and trochanter.  IV site developed phlebitis so IV was removed and she is awaiting SWAT to place another.  She was able to make needs known through a Leanne .  She is drinking well and takes pills whole.  BP 16/75 and O2 sats are above 90.  She is on capnography for 24 hours post op.

## 2023-12-28 NOTE — PROGRESS NOTES
[facility]    Progress Note - Hospitalist Service       Date of Admission:  12/27/2023    Assessment & Plan   Claudia Mckeon is a 78 year old female admitted on 12/27/2023. She has a history of uncontrolled T2DM, HTN, HLD and is admitted for right hip fracture after a fall. POD#1 (12/27) from internal fixation of the right hip. Also adjusting her new insulin regimen, likely to discharge to TCU on POD3 with insulin regimen.     Fall  Acute right hip fracture  POD1 R hip repair   Patient had a fall around 0100 on 12/27. Uses walker at baseline. She fell on her butt, did not hit her head, no LOC, no blood thinners (baby aspirin only). Immediately had right hip pain and was unable to stand up, had to crawl to ask for help. [facility] and found to have right hip fracture and sent to [facility] ED. On admission, imaging was obtained which showed an intertrochanteric fracture of the right femur. Operative repair on 12/27, now POD#1 and pain present but improved.   - Ortho consult, appreciate their recs              - Operative repair on 12/27   - Pain 7/10 today   - Aspirin 81 mg BID x 4 weeks postop for VTE prophylaxis    - 48 hours of cefazolin and then keflex for 14 days total   - 2 week follow up with Dr. Waldron for wound check and xR   - Consider referral to Dr. Harrison for bone health evaluation.   - PT/OT/SW consults  - Imaging              - CT H and cervical spine: No acute change. Degenerative changes present              - XR R Femur and pelvis: R Intertrochanteric fracture with moderate displacement              - XR R knee: No fracture. Degenerative changes. Osteopenia.   - Pain control:              - Heat, ice, tylenol              - IV dilaudid and PO oxycodone  - Regular diet     Anemia  Patient with hgb of 9.3 on admission with recent value of 13.4 one week ago. MCV 87. No signs of active bleeding on exam other than into the ecchymosis of her right inner thigh. Hgb  "stable at 9.0 this AM.  - Daily CBC  - Type and screen performed, consent obtained   - Received rRBC on 12/27 before hip repair      Type 2 DM  A1c over 15 on 12/19/23. Patient on oral medications at home, likely needs insulin therapy at this point. Will hold PTA oral anti-diabetic medications in favor of insulin regimen during stay. Glucose of 170 this AM. Will revaluate insulin use this evening and adjust her lantus dosing appropriately.   - Holding PTA jardiance and metformin  - PTA gabapentin  - Will use insulin for glucose control while admitted              - TDD of insulin 20 units based on BMI weight based estimation   - Lantus 10 units evening, 3 units mealtime and medium sliding scale   - Would likely benefit from insulin management in outpatient setting             Osteopenia  Osteopenia noted in right knee of imaging today. Do not see history of DEXA scan.   - Could consider DEXA scan in outpatient setting  - Consider referral to Dr. Harrison for bone health evaluation.      Constipation  No bowel movement for 2-3 days and some subjective \"gassy pain\". Patient reports its been almost a week since her last bowel movement.   - Scheduled senna BID and miralax daily in the setting of hip repair and likely opioid use  - PRN: Senna and miralax  - Will monitor for BM     Chronic:  HTN: Patient had lisinopril on medlist previously but was not taking it on med rec.   HLD: PTA lipitor             Diet: Advance Diet as Tolerated: Regular Diet Adult  Discharge Instruction - Regular Diet Adult    DVT Prophylaxis: Aspirin 81 mg BID x 4 weeks postop   Faith Catheter: Not present  Fluids: PO  Lines: None     Cardiac Monitoring: None  Code Status: Full Code      Clinically Significant Risk Factors Present on Admission                    # Dementia: noted on problem list   # DMII: A1C = >15.0 % (Ref range: 0.0 - 5.6 %) within past 6 months               Disposition Plan      Expected Discharge Date: 12/30/2023              "   The patient's care was discussed with the Attending Physician, Dr. Rivas .    Jose Bray MD  Hospitalist Service  [facility]  Securely message with [employer] (more info)  Text page via [employer] Paging/Directory   ______________________________________________________________________    Interval History   Patient had surgical repair of her R hip fracture yesterday, uncomplicated. She reports that the surgery went well. Her pain is somewhat improved but still having 6-7/10 pain in her right hip. She is eating and drinking okay. Urinating without issue. She still has not had a bowel movement but is passing gas. Has not had a bowel movement for at least 3 days but patient says maybe more like a week.     Patient denies headache, fever, chills, shortness of breath, difficulty breathing, chest pain, nausea, vomiting, change in urination.       Physical Exam   Vital Signs: Temp: 98.1  F (36.7  C) Temp src: Oral BP: 111/54 Pulse: 79   Resp: 20 SpO2: 96 % O2 Device: Nasal cannula Oxygen Delivery: 2 LPM  Weight: 0 lbs 0 oz  GENERAL: Healthy, alert and no distress  EYES: Eyes grossly normal to inspection.  No discharge or erythema, or obvious scleral/conjunctival abnormalities.  RESP:  Lungs clear throughout. No wheeze or crackles.   CV: Heart RRR. No murmur  Abdomen: Soft, nontender, nondistended. Mild firmness likely stool burden present in LLQ - nontender.   MSK: No gross deformity. Normal tone.  SKIN: Visible skin clear. No significant rash, abnormal pigmentation or lesions.  NEURO: Cranial nerves grossly intact.  Mentation and speech appropriate for age.  PSYCH: Mentation appears normal, affect normal, judgement and insight intact, normal speech and appearance well-groomed.      Medical Decision Making       Please see A&P for additional details of medical decision making.      Data   ------------------------- PAST 24 HR DATA REVIEWED -----------------------------------------------    I have  personally reviewed the following data over the past 24 hrs:    9.0  \   8.9 (L)   / 190     139 104 13.9 /  170 (H)   3.9 27 0.76 \     Procal: 0.24 CRP: N/A Lactic Acid: 1.4       INR:  1.06 PTT:  24   D-dimer:  N/A Fibrinogen:  N/A       Imaging results reviewed over the past 24 hrs:   Recent Results (from the past 24 hour(s))   CT Head w/o Contrast    Narrative    EXAM: CT HEAD W/O CONTRAST  LOCATION: [facility]  DATE: 12/27/2023    INDICATION: fall age 78 distracting injury dementia, trauma, injury.  COMPARISON: Brain MRI 01/17/2014  TECHNIQUE: Routine CT Head without IV contrast. Multiplanar reformats. Dose reduction techniques were used.    FINDINGS:  INTRACRANIAL CONTENTS: No intracranial hemorrhage. Mild diffuse cerebral parenchymal volume loss. No midline shift. The basilar cisterns are patent. No cerebellar tonsillar ectopia. Mild periventricular and scattered foci of deep white matter   hypodensities involving both cerebral hemispheres. No CT evidence for an acute infarct.    VISUALIZED ORBITS/SINUSES/MASTOIDS: No intraorbital abnormality. Mild mucosal thickening of ethmoid air cells. Small chronic mucosal thickening of the left sphenoid sinus. No middle ear or mastoid effusion.    BONES/SOFT TISSUES: No acute abnormality.      Impression    IMPRESSION:  1.  No intracranial hemorrhage, mass lesions, hydrocephalus or CT evidence for an acute infarct.  2.  Mild diffuse cerebral parenchymal volume loss. Chronic hypertensive/microvascular ischemic white matter changes.   CT Cervical Spine w/o Contrast    Narrative    EXAM: CT CERVICAL SPINE W/O CONTRAST  LOCATION: [facility]  DATE: 12/27/2023    INDICATION: fall distracting injury dementia, trauma, injury.  COMPARISON: None.  TECHNIQUE: Routine CT Cervical Spine without IV contrast. Multiplanar reformats. Dose reduction techniques were used.    FINDINGS:  VERTEBRA: Mild reversal of the normal cervical  lordosis. Normal sagittal alignment. Craniocervical junction is within normal limits. Vertebral body heights are maintained. Small well-corticated osseous defect involving the anterior endplates from C4 to   C7. No fractures.    CANAL/FORAMINA: Intervertebral disc spaces are within normal limits. Mild spinal canal narrowing at C2-C3. Mild to moderate spinal canal narrowing at C3-C4. Moderate severe right neuroforaminal narrowing at C3-C4. Mild right neuroforaminal narrowing at   C5-C6.    PARASPINAL: No extraspinal abnormality.      Impression    IMPRESSION:  1.  Mild reversal of the normal cervical lordosis, nonspecific. It can be seen in muscle spasm.  2.  No fracture or posttraumatic subluxation.     XR Chest Port 1 View    Narrative    EXAM: XR CHEST PORT 1 VIEW  LOCATION: [facility]  DATE: 12/27/2023    INDICATION: Preoperative exam.  COMPARISON: None.      Impression    IMPRESSION: Negative chest.   XR Femur Right 2 Views    Narrative    EXAM: XR FEMUR RIGHT 2 VIEWS  LOCATION: [facility]  DATE: 12/27/2023    INDICATION: Right hip pain.  COMPARISON: None.      Impression    IMPRESSION: Acute moderately displaced intertrochanteric fracture of the right femur with mild resulting varus angulation. Advanced degenerative changes in the medial compartment of the right knee.   XR Pelvis 1/2 Views    Narrative    EXAM: XR PELVIS 1/2 VIEWS  LOCATION: [facility]  DATE: 12/27/2023    INDICATION: Right hip pain.  COMPARISON: None.      Impression    IMPRESSION: Acute moderately displaced intertrochanteric fracture of the right femur with superior displacement of the femoral shaft. Osteopenia.   XR Knee Right 1/2 Views    Narrative    EXAM: XR KNEE RIGHT 1/2 VIEWS  LOCATION: [facility]  DATE: 12/27/2023    INDICATION: Right knee pain.  COMPARISON: None.      Impression    IMPRESSION: No fracture. Advanced degenerative changes  "in the medial compartment and mild degenerative changes in the lateral compartment. Osteopenia. Small effusion.   POC US Guidance Needle Placement    Narrative    Ultrasound was performed as guidance to an anesthesia procedure.  Click   \"PACS images\" hyperlink below to view any stored images.  For specific   procedure details, view procedure note authored by anesthesia.   POC US Guidance Needle Placement    Narrative    Ultrasound was performed as guidance to an anesthesia procedure.  Click   \"PACS images\" hyperlink below to view any stored images.  For specific   procedure details, view procedure note authored by anesthesia.   XR Surgery MERCEDES Fluoro L/T 5 Min    Narrative    This exam was marked as non-reportable because it will not be read by a   radiologist or a [place] non-radiologist provider.         XR Femur Port Right 2 Views    Narrative    EXAM: XR PELVIS PORT 1/2 VIEWS, XR FEMUR PORT RIGHT 2 VIEWS  LOCATION: [facility]  DATE: 12/27/2023    INDICATION: Status post hip surgery.  COMPARISON: None.      Impression    IMPRESSION: Postoperative changes of IM sheryl and screw fixation traversing a comminuted intertrochanteric fracture of the right hip. This is in good anatomic alignment with mild distraction of a fracture fragment involving the lesser trochanter. Post   procedural air surrounding the right hip. No dislocation. Left hip negative for fracture. Pelvis negative for fracture. Right femur otherwise negative for fracture. Additional degenerative change at the right knee joint.   XR Pelvis Port 1/2 Views    Narrative    EXAM: XR PELVIS PORT 1/2 VIEWS, XR FEMUR PORT RIGHT 2 VIEWS  LOCATION: [facility]  DATE: 12/27/2023    INDICATION: Status post hip surgery.  COMPARISON: None.      Impression    IMPRESSION: Postoperative changes of IM sheryl and screw fixation traversing a comminuted intertrochanteric fracture of the right hip. This is in good anatomic " alignment with mild distraction of a fracture fragment involving the lesser trochanter. Post   procedural air surrounding the right hip. No dislocation. Left hip negative for fracture. Pelvis negative for fracture. Right femur otherwise negative for fracture. Additional degenerative change at the right knee joint.

## 2023-12-28 NOTE — BRIEF OP NOTE
[facility]    Brief Operative Note    Pre-operative diagnosis: Hip fracture, right, closed, initial encounter (H) [S72.001A]  Post-operative diagnosis Same as pre-operative diagnosis    Procedure: INTERNAL FIXATION, FRACTURE, TROCHANTERIC, HIP, USING PINS OR RODS, Right - Hip    Surgeon: Surgeon(s) and Role:     * Eugenio Waldron MD - Primary  Anesthesia: MAC with Block   Estimated Blood Loss: 250 mL from 12/27/2023  4:13 PM to 12/27/2023  6:59 PM      Drains: None  Specimens: * No specimens in log *  Findings:   Four-part unstable intertrochanteric femur fracture with subtrochanteric extension.  Was able to reduce in uniplanar fashion with the [place] table.  Then using external pressure from a posterior to anterior direction was able to reduce the shaft segment to the basicervical neck segment.  Long cephalomedullary nail placed for fixation. .  Complications: None apparent .  Implants:   Implant Name Type Inv. Item Serial No.  Lot No. LRB No. Used Action   IMP WIRE ELLIOT [id number] 1210-6450S - [id number]  IMP WIRE ELLIOT [id number] 1210-6450S  [employer] [id number] Right 1 Used as a Supply   IMP WIRE ELLIOT [id number] 1210-6450S - [id number]  IMP WIRE ELLIOT [id number] 1210-6450S  [employer] [id number] Right 1 Used as a Supply   [employer]AM[employer] 3 SYSTEM LONG NAIL R1.5    [employer] [id number] Right 1 Implanted   IMP SCR BONE STRK G3 LAG 10.5X90MM TI 3060-0090S - [id number] Metallic [employer]/[place] IMP SCR BONE STRK G3 LAG 10.5X90MM TI 3060-0090S  [employer] [id number] Right 1 Implanted   IMP WIRE ELLIOT [id number] 1210-6450S - [id number]  IMP WIRE ELLIOT [id number] 1210-6450S  [employer] [id number] Right 1 Used as a Supply   IMP SCR STRK LOCK 5.0X37.5MM FT 1896-5037S - [id number] Metallic Hardware/[place] IMP SCR STRK LOCK 5.0X37.5MM FT 1896-5037S  [employer] [id number] Right 1 Implanted   IMP SCR STRK LOCK 5.0X35MM FT 1896-5035S - [id number]  Metallic Hardware/[place] IMP SCR STRK LOCK 5.0X35MM FT 9400-0860S  [employer] [id number] Right 1 Implanted

## 2023-12-28 NOTE — OP NOTE
PATIENT: Claudia Mckeon    MEDICAL RECORD #: [id number]    DATE OF OPERATION: 12/27/2023    PREOPERATIVE DIAGNOSIS: Right intertrochanteric femur fracture with subtrochanteric extension    POSTOPERATIVE DIAGNOSIS: Right intertrochanteric femur fracture with subtrochanteric extension    OPERATION: Trochanteric fixation of right intertrochanteric femur fracture with subtrochanteric extension    SURGEON: Eugenio Waldron MD    ASSISTANT(s): Lelo Sierra PA-C    A skilled assistant was required due to the nature of the case for patient position, retraction, exposure, implant placement, closure and dressing application.    ANESTHESIA: Spinal, fascia iliaca block    COMPLICATIONS: No Immediate    ANTIBIOTICS: Cefazolin in a weight based dose IV given within 1 hour prior to skin incision    OTHER MEDICATIONS: Tranexamic acid 1000 mg IV at start of case, and 1000 mg IV at start of wound closure    EBL: 250 mL    FINDINGS:  Four-part unstable intertrochanteric femur fracture with subtrochanteric extension.  Was able to reduce in uniplanar fashion with the [place] table.  Then using external pressure from a posterior to anterior direction was able to reduce the shaft segment to the basicervical neck segment.  Long cephalomedullary nail placed for fixation     IMPLANTS:  System: [employer] gamma 3  10 mm x 320 mm 130 degree cephalomedullary nail  9.5 mm x 90 mm lag screw  5.0 x 37.5 mm and 5.0 x 35 mm distal interlocking screws    PATIENT HISTORY:  The patient is a 78 year old year-old female with a past medical history of anemia (hgb 9.3 on admission), DM2 (hgb A1c >15 on 12/19/2023), HLD, HTN  who sustained a right intertrochanteric femur fracture with subtrochanteric extension as the result of a ground level fall. After considering the patient's condition and the impact of their injury on the patient's quality of life a closed versus open reduction and surgical fixation procedure was offered to the patient as a reasonable  option.    Prior to the surgery I discussed the nature of the surgery including alternatives to surgery and the purpose of, and indications for proceeding with surgery. I discussed that this surgery is a shared decision between the patient, family and the surgeon.    Risks and benefits and alternatives of the procedure have been explained to the patient and their family.  Anesthesia complications and risks include but are not limited to stroke, heart attack, and death. The surgical risks include but are not limited to infection, bleeding, nerve and blood vessel injury, deep vein thrombosis, pulmonary embolus, stiffness, pain, scar, need for reoperation, thigh/leg numbness, weakness, and mechanical failure of the implant including loosening, metal complications, metal allergy, wear or breakage.  I discused the expected recovery from surgery and the importance of compliance with all our pre and post-operative recommendations in order to maximize the recovery. The patient understands the risks of loss of life, loss of limb and, loss of function and wishes to proceed. They understand they are at increased risk for infection, wound healing complications, nonunion, malunion, hardware failure given their history of uncontrolled type 2 diabetes.   A signed and witnessed consent was obtained and placed in the chart.    DESCRIPTION OF PROCEDURE:  The patient was identified in the preoperative area. A signed and witness consent was confirmed in the chart. The surgery team confirmed with the patient the operative plan and surgical site. The surgical site was confirmed by the patient and marked by the surgical team. The patient was given the opportunity to ask any further questions and all questions were answered.     The patient was brought to the operating room where anesthesia was induced by the anesthesia team without incident.     PATIENT POSITIONING:   The patient was placed in the supine position on a HANA table with the  feet well padded in the boots. All extremities were padded to ensure adequate protection.       TIME OUT:  A timeout was performed prior to the procedure which verified the correct patient, positioning, operation to be performed, operative site, antibiotics, allergies, imaging, and any other concerns. All parties were in agreement.     PROCEDURE IN DETAIL:  Prior to prepping and draping the c-arm was brought in and AP and lateral fluoroscopic images were obtained. A reduction maneuver using the [place] table was performed including traction, internal rotation, and adduction of the affected lower extremity.  Multiple shots in different angles of the fracture were visualized as the fracture did not reduce easily with these standard maneuvers.  There was some residual flexion of the proximal fragment and extension of the distal fragment.  Utilizing some external pressure from a posterior to anterior direction around the greater trochanter this allowed improved reduction of the medial calcar arc on all visualized views.    The operative site was then prepped and draped in usual sterile fashion. A final timeout was then performed confirming patient, procedure, laterality, and surgical site. All in the room agreed.    A 3 cm incision was made just proximal to the greater trochanter in line with the femur. Sharp dissection was made down through the gluteal fascia followed by blunt finger dissection down to the tip of the greater trochanter. The abductors were palpated and protected.  There was significant comminution at the tip of the greater trochanter and no discernible routine start point could be identified with the guidepin.  The guidepin was able to be advanced through fracture into the proximal femur into the diaphysis.  The position was confirmed to be at the tip of the greater trochanter on the AP view, and in the anterior one third per  instructions on the lateral view. The opening reamer was used to  open the proximal femur.     A ball tipped guide wire was introduced into the femur and advanced to the center of the physeal scar in the distal femur. It's position was confirmed with AP and lateral fluoroscopic images. Sequential reaming was performed of the femoral canal over the ball tipped guide wire starting at 9 mm reamer and finishing at 11.5 mm reamer when there was good cortical contact.    The 10 x 320 130 degree intramedullary nail was opened to the back table, assembled to the aiming jig and then carefully inserted into the proximal femur. This was malleted into place checking implant position and fracture reduction on AP and lateral fluoroscopic images. Once in the desired position, a small incision was made laterally to allow for the aiming sleeve for the cephalomedullary screw to be placed onto the lateral femur.  During this maneuver and placing the guide sleeve down on the lateral bone it was noted that the fracture remained quite unstable.  The fracture reduction was held again, using that external pressure from a posterior to anterior direction over the greater trochanter.  Additionally, a turn of fine traction was added to the [place] table in order to preferentially place the patient in slight valgus alignment for better control collapse of the basicervical fragment.  As this was being performed, A guide pin was placed into the femoral neck and head through the nail and checked on AP and lateral fluoroscopic imaging.  The pin was positioned low in the neck, and in the posterior one third in order to optimize screw fixation in the best bone possible in the femoral neck and head.  Once confirming position, this was measured and overdrilled.  Next, a 95 mm for medullary lag screw was placed.  During placement of this screw at about the midpoint of the femoral neck the fracture began to mild reduce.  The screw was subsequently removed, the fracture was reduced utilizing a bone hook on the medial neck  in order to hold the basicervical femoral neck in place in addition to a rotation pin placed in the anterior superior portion of the femoral neck and head.  Utilizing these 2 stabilizing forces, the 95 mm cephalomedullary lag screw was then able to be placed.  The reduction was confirmed on multiple rotational views, but with some residual distraction.    The setscrew was then placed in the top of the nail and fully tightened and released half turn.  Next, the fracture was compressed through the nail and lag screw maximally.    While there were elements of this fracture that were subtrochanteric in nature, the basicervical portion remained the primary fracture component and due to this, the setscrew remained a half turn loosened from fully tight in order to allow further compression through the lag screw as the patient begins to weight-bear.    Next, distally, two interlocking screws were placed using perfect [place] technique. A small lateral incision was made down to bone on the lateral distal femur. The screw holes in the distal IMN were drilled, measured, and appropriate length screws were placed.    The aiming guide was then disassembled and final AP and lateral fluoroscopic images were obtained of the femur confirming implant position and fracture reduction.    The wounds were then copiously irrigated with normal saline. The deep fascia was closed with 0 Vicryl. Deep dermal closed with 2-0 Vicryl. Skin closed 3-0 Monocryl followed by derambond and steristrips. Sterile dressings were placed.    The drapes were then taken down and the patient was placed supine. The patient's lower extremities were warm and well perfused with brisk capillary refill and palpable pulses. The patient was then awoken, transferred to the hospital cart and taken to the PACU in stable condition. The patient tolerated the procedure well. The patient's family/caregivers were made aware of their condition.    Final sponge and needle counts  were correct x2.    POSTOPERATIVE PLAN:  Pain Control: Continue per multimodal pain protocol. Minimize opioid pain medications as able.  Weight Bearing: Weight bearing as tolerated  Precautions: Recommend walker at all times  DVT Prophylaxis: Risk stratified DVT prophylaxis. Aspirin 81 mg BID x 4 weeks postop. Early ambulation and SCDs while in bed.  Antibiotics: Given high risk and uncontrolled type 2 diabetes we will plan for 48 hours of IV cefazolin, followed by 2 weeks of oral antibiotic prophylaxis (Keflex while inpatient; Duricef as outpatient)  Diet: Advance diet as tolerated from orthopedic perspective  GI: Plan for aggressive bowel regimen to prevent constipation from opioid medications  Lines: HLIV once tolerating PO  PT/OT: Eval and treatment. Will follow up on recommendations.  Follow up:  2 weeks with Dr. Waldron for wound check and XR.  Consider referral to Dr. Harrison for bone health evaluation.  Discharge plan: Pending PT/OT evaluation and ongoing workup by medical team    Eugenio Waldron MD  Orthopedic Surgery  [facility]

## 2023-12-28 NOTE — PROGRESS NOTES
Orthopedic Progress Note      Assessment: 1 Day Post-Op  S/P Procedure(s):  INTERNAL FIXATION, FRACTURE, TROCHANTERIC, RIGHT HIP, USING RODS     Plan:   Pain Control: Continue per multimodal pain protocol. Minimize opioid pain medications as able.  Weight Bearing: Weight bearing as tolerated  Precautions: Recommend walker at all times  DVT Prophylaxis: Risk stratified DVT prophylaxis. Aspirin 81 mg BID x 4 weeks postop. Early ambulation and SCDs while in bed.  Antibiotics: Given high risk and uncontrolled type 2 diabetes we will plan for 48 hours of IV cefazolin, followed by 2 weeks of oral antibiotic prophylaxis (Keflex while inpatient; Duricef as outpatient)  Diet: Advance diet as tolerated from orthopedic perspective  GI: Plan for aggressive bowel regimen to prevent constipation from opioid medications  Labs: Hgb 8.9  PT/OT: Eval and treatment. Will follow up on recommendations.  Follow up:  2 weeks with Dr. Waldron for wound check and XR.  Consider referral to Dr. Harrison for bone health evaluation.  Discharge plan: Pending PT/OT evaluation and ongoing workup by medical team      Subjective:  Pain: mild  Nausea, Vomiting:  No  Lightheadedness, Dizziness:  No  Neuro:  Patient denies new onset numbness or paresthesias  Fever, chills: No  Chest pain: No  SOB: No    Patient reports feeling well today. Patient reports pain is tolerable with current pain regimen. Patient eating and drinking well. Patient voiding and passing gas however no BM.  Sitting up in chair. All questions/concerns answered.      Objective:  /54 (BP Location: Left arm)   Pulse 79   Temp 98.1  F (36.7  C) (Oral)   Resp 20   SpO2 96%     The patient is A&Ox3. Appears comfortable, sitting up at bedside.  Calves without tenderness, neg Shadi's  Brisk capillary refill in the toes.   Palpable Right dorsalis pedis pulse. Right foot warm & well-perfused.  Sensation is intact to light touch & equal bilaterally in the femoral, DP, SP & tibial nerve  distributions.  ROM: Appropriately flexes & extends all toes bilaterally.   Motor: +5/5 dorsiflexion, plantar flexion & EHL bilaterally.   Leg lengths equal.  Dressing C/D/I without strikethrough, no surrounding erythema.      No drain     Pertinent Labs   Lab Results: personally reviewed.   Lab Results   Component Value Date    INR 1.06 12/27/2023    INR  12/27/2023      Comment:      Disregard results. Specimen clotted.  This is a corrected result. Previous result was 1.02 on 12/27/2023 at 12:31 PM CST     Lab Results   Component Value Date    WBC 9.0 12/28/2023    HGB 8.9 (L) 12/28/2023    HCT 27.3 (L) 12/28/2023    MCV 88 12/28/2023     12/28/2023     Lab Results   Component Value Date     12/28/2023    CO2 27 12/28/2023         Report completed by:  KRAIG DOMINGUEZ PA-C  Date: 12/28/2023  [facility]

## 2023-12-28 NOTE — ANESTHESIA CARE TRANSFER NOTE
Patient: Ree May    Procedure: Procedure(s):  INTERNAL FIXATION, FRACTURE, TROCHANTERIC, HIP, USING PINS OR RODS       Diagnosis: Hip fracture, right, closed, initial encounter (H) [S72.001A]  Diagnosis Additional Information: No value filed.    Anesthesia Type:   Spinal     Note:    Oropharynx: oropharynx clear of all foreign objects  Level of Consciousness: awake  Oxygen Supplementation: face mask  Level of Supplemental Oxygen (L/min / FiO2): 6  Independent Airway: airway patency satisfactory and stable  Dentition: dentition unchanged  Vital Signs Stable: post-procedure vital signs reviewed and stable  Report to RN Given: handoff report given  Patient transferred to: PACU    Handoff Report: Identifed the Patient, Identified the Reponsible Provider, Reviewed the pertinent medical history, Discussed the surgical course, Reviewed Intra-OP anesthesia mangement and issues during anesthesia, Set expectations for post-procedure period and Allowed opportunity for questions and acknowledgement of understanding      Vitals:  Vitals Value Taken Time   /58 12/27/23 1905   Temp 98.5f    Pulse 74 12/27/23 1906   Resp 17 12/27/23 1906   SpO2 100 % 12/27/23 1906   Vitals shown include unfiled device data.    Electronically Signed By: LAURA Rodrigues CRNA  December 27, 2023  7:08 PM

## 2023-12-28 NOTE — PLAN OF CARE
Goal Outcome Evaluation:       Patient was incontinent during the night. Little pain at the IV site. Scheduled tylenol given, and was effective. Slept well during the night. Saline locked.

## 2023-12-29 ENCOUNTER — APPOINTMENT (OUTPATIENT)
Dept: PHYSICAL THERAPY | Facility: HOSPITAL | Age: 78
DRG: 481 | End: 2023-12-29
Payer: COMMERCIAL

## 2023-12-29 LAB
ANION GAP SERPL CALCULATED.3IONS-SCNC: 9 MMOL/L (ref 7–15)
BUN SERPL-MCNC: 10.8 MG/DL (ref 8–23)
CALCIUM SERPL-MCNC: 8.8 MG/DL (ref 8.8–10.2)
CHLORIDE SERPL-SCNC: 104 MMOL/L (ref 98–107)
CREAT SERPL-MCNC: 0.69 MG/DL (ref 0.51–0.95)
DEPRECATED HCO3 PLAS-SCNC: 26 MMOL/L (ref 22–29)
EGFRCR SERPLBLD CKD-EPI 2021: 87 ML/MIN/1.73M2
ERYTHROCYTE [DISTWIDTH] IN BLOOD BY AUTOMATED COUNT: 13.2 % (ref 10–15)
GLUCOSE BLDC GLUCOMTR-MCNC: 284 MG/DL (ref 70–99)
GLUCOSE BLDC GLUCOMTR-MCNC: 328 MG/DL (ref 70–99)
GLUCOSE BLDC GLUCOMTR-MCNC: 367 MG/DL (ref 70–99)
GLUCOSE BLDC GLUCOMTR-MCNC: 417 MG/DL (ref 70–99)
GLUCOSE BLDC GLUCOMTR-MCNC: 93 MG/DL (ref 70–99)
GLUCOSE SERPL-MCNC: 126 MG/DL (ref 70–99)
HCT VFR BLD AUTO: 29.6 % (ref 35–47)
HGB BLD-MCNC: 9.5 G/DL (ref 11.7–15.7)
MCH RBC QN AUTO: 28.7 PG (ref 26.5–33)
MCHC RBC AUTO-ENTMCNC: 32.1 G/DL (ref 31.5–36.5)
MCV RBC AUTO: 89 FL (ref 78–100)
PLATELET # BLD AUTO: 230 10E3/UL (ref 150–450)
POTASSIUM SERPL-SCNC: 3.9 MMOL/L (ref 3.4–5.3)
RBC # BLD AUTO: 3.31 10E6/UL (ref 3.8–5.2)
SODIUM SERPL-SCNC: 139 MMOL/L (ref 135–145)
WBC # BLD AUTO: 9.3 10E3/UL (ref 4–11)

## 2023-12-29 PROCEDURE — 97110 THERAPEUTIC EXERCISES: CPT | Mod: GP

## 2023-12-29 PROCEDURE — 36415 COLL VENOUS BLD VENIPUNCTURE: CPT

## 2023-12-29 PROCEDURE — 250N000011 HC RX IP 250 OP 636: Performed by: STUDENT IN AN ORGANIZED HEALTH CARE EDUCATION/TRAINING PROGRAM

## 2023-12-29 PROCEDURE — 250N000013 HC RX MED GY IP 250 OP 250 PS 637

## 2023-12-29 PROCEDURE — 250N000013 HC RX MED GY IP 250 OP 250 PS 637: Performed by: STUDENT IN AN ORGANIZED HEALTH CARE EDUCATION/TRAINING PROGRAM

## 2023-12-29 PROCEDURE — 99232 SBSQ HOSP IP/OBS MODERATE 35: CPT | Mod: GC

## 2023-12-29 PROCEDURE — 120N000001 HC R&B MED SURG/OB

## 2023-12-29 PROCEDURE — 80048 BASIC METABOLIC PNL TOTAL CA: CPT

## 2023-12-29 PROCEDURE — 85014 HEMATOCRIT: CPT

## 2023-12-29 PROCEDURE — 97530 THERAPEUTIC ACTIVITIES: CPT | Mod: GP

## 2023-12-29 RX ORDER — POLYETHYLENE GLYCOL 3350 17 G/17G
17 POWDER, FOR SOLUTION ORAL 2 TIMES DAILY
Status: DISCONTINUED | OUTPATIENT
Start: 2023-12-29 | End: 2024-01-02 | Stop reason: HOSPADM

## 2023-12-29 RX ADMIN — POLYETHYLENE GLYCOL 3350 17 G: 17 POWDER, FOR SOLUTION ORAL at 09:08

## 2023-12-29 RX ADMIN — Medication 81 MG: at 09:08

## 2023-12-29 RX ADMIN — INSULIN ASPART 3 UNITS: 100 INJECTION, SOLUTION INTRAVENOUS; SUBCUTANEOUS at 12:41

## 2023-12-29 RX ADMIN — OXYCODONE HYDROCHLORIDE 5 MG: 5 TABLET ORAL at 09:28

## 2023-12-29 RX ADMIN — OXYCODONE HYDROCHLORIDE 5 MG: 5 TABLET ORAL at 21:18

## 2023-12-29 RX ADMIN — CEFAZOLIN 1 G: 1 INJECTION, POWDER, FOR SOLUTION INTRAMUSCULAR; INTRAVENOUS at 09:08

## 2023-12-29 RX ADMIN — SENNOSIDES AND DOCUSATE SODIUM 1 TABLET: 50; 8.6 TABLET ORAL at 21:04

## 2023-12-29 RX ADMIN — BISACODYL 10 MG: 10 SUPPOSITORY RECTAL at 09:31

## 2023-12-29 RX ADMIN — INSULIN ASPART 4 UNITS: 100 INJECTION, SOLUTION INTRAVENOUS; SUBCUTANEOUS at 17:14

## 2023-12-29 RX ADMIN — POLYETHYLENE GLYCOL 3350 17 G: 17 POWDER, FOR SOLUTION ORAL at 21:04

## 2023-12-29 RX ADMIN — OXYCODONE HYDROCHLORIDE 5 MG: 5 TABLET ORAL at 16:00

## 2023-12-29 RX ADMIN — ACETAMINOPHEN 975 MG: 325 TABLET ORAL at 21:04

## 2023-12-29 RX ADMIN — ACETAMINOPHEN 975 MG: 325 TABLET ORAL at 05:19

## 2023-12-29 RX ADMIN — ACETAMINOPHEN 975 MG: 325 TABLET ORAL at 13:54

## 2023-12-29 RX ADMIN — CEFAZOLIN 1 G: 1 INJECTION, POWDER, FOR SOLUTION INTRAMUSCULAR; INTRAVENOUS at 01:17

## 2023-12-29 RX ADMIN — SENNOSIDES AND DOCUSATE SODIUM 1 TABLET: 50; 8.6 TABLET ORAL at 09:08

## 2023-12-29 RX ADMIN — CEFAZOLIN 1 G: 1 INJECTION, POWDER, FOR SOLUTION INTRAMUSCULAR; INTRAVENOUS at 16:02

## 2023-12-29 RX ADMIN — Medication 81 MG: at 21:04

## 2023-12-29 ASSESSMENT — ACTIVITIES OF DAILY LIVING (ADL)
ADLS_ACUITY_SCORE: 28
ADLS_ACUITY_SCORE: 27
ADLS_ACUITY_SCORE: 28
ADLS_ACUITY_SCORE: 27
ADLS_ACUITY_SCORE: 28
ADLS_ACUITY_SCORE: 27
ADLS_ACUITY_SCORE: 28
ADLS_ACUITY_SCORE: 28

## 2023-12-29 NOTE — PLAN OF CARE
Goal Outcome Evaluation:         Problem: Adult Inpatient Plan of Care  Goal: Absence of Hospital-Acquired Illness or Injury  Intervention: Prevent Skin Injury  Recent Flowsheet Documentation  Taken 12/29/2023 0900 by Tanesha Wray RN  Device Skin Pressure Protection: absorbent pad utilized/changed     Problem: Adult Inpatient Plan of Care  Goal: Optimal Comfort and Wellbeing  Outcome: Progressing     Problem: Adult Inpatient Plan of Care  Goal: Optimal Comfort and Wellbeing  Outcome: Progressing     Problem: Adult Inpatient Plan of Care  Goal: Optimal Comfort and Wellbeing  Intervention: Monitor Pain and Promote Comfort  Recent Flowsheet Documentation  Taken 12/29/2023 0928 by Tanesha Wray, RN  Pain Management Interventions:   medication (see MAR)   cold applied   pillow support provided     Problem: Risk for Delirium  Goal: Improved Attention and Thought Clarity  Outcome: Progressing      Nursing used a Leanne  for assessment.  Patient A&OX4.  Patient given prn oxycodone X1 on this shift for pain rated 6/10.  Patient was repositioned Q2H to prevent skin breakdown.  Incision site is bruised and swollen.  Dressing WDL no drainage noted.  Ice applied to incision site.  Pillow placed between legs when patient is in bed.  CMS is intact.  Legs are equal in length.  No internal rotation noted.  Patient up to chair on this shift.  BG @ 1200 was 284.  Sliding scale insulin was given as ordered and meal time insulin was given with morning and afternoon meal.

## 2023-12-29 NOTE — PROGRESS NOTES
"CM received consult to aide in discharge planning.     RNCM placed phone call via  services was unable to get through to patient, phone stated \"not available please try again later\". Unable to leave voicemail on Martha's cell phone. Also Imtiaz's cell phone.     Patient's contacts numbers are not in working order/ unable to leave voicemail. No family at bedside.     CM will follow up with patient's family regarding discharge when family is present/ able. Consult left until able to connect with family.     2:59 PM - Attempted to meet with patient family at bedside, no one present.     Francesca Cole RN   "

## 2023-12-29 NOTE — PLAN OF CARE
"Goal Outcome Evaluation:      Problem: Adult Inpatient Plan of Care  Goal: Patient-Specific Goal (Individualized)  Description: You can add care plan individualizations to a care plan. Examples of Individualization might be:  \"Parent requests to be called daily at 9am for status\", \"I have a hard time hearing out of my right ear\", or \"Do not touch me to wake me up as it startles  me\".  Outcome: Progressing     Problem: Adult Inpatient Plan of Care  Goal: Optimal Comfort and Wellbeing  Outcome: Progressing     Problem: Risk for Delirium  Goal: Improved Attention and Thought Clarity  Outcome: Progressing    A/O x4. VSS. Sat 93% on RA. Scheduled Tylenol and PRN Oxycodone were given for right hip pain which were effective per pt. No BM today. Using purewick. Surgical incision dressing is dry and intact. Up to chair for meals with assist of two.                             "

## 2023-12-29 NOTE — PLAN OF CARE
Goal Outcome Evaluation:       Patient did c/o of pain at the IV site, and  requested to take the IV out. Scheduled tylenol given, and the IV was reinserted at the different location. Slept well throughout the night. No nausea or vomiting noted during the shift. No bowel movement overnight. Denies hip pain, while lying quietly in the back.

## 2023-12-29 NOTE — PROGRESS NOTES
Orthopedic Progress Note      Assessment: 2 Days Post-Op  S/P Procedure(s):  INTERNAL FIXATION, FRACTURE, TROCHANTERIC, RIGHT HIP, USING RODS     Plan:   Pain Control: Continue per multimodal pain protocol. Minimize opioid pain medications as able.  Weight Bearing: Weight bearing as tolerated  Precautions: Recommend walker at all times  DVT Prophylaxis: Risk stratified DVT prophylaxis. Aspirin 81 mg BID x 4 weeks postop. Early ambulation and SCDs while in bed.  Antibiotics: Given high risk and uncontrolled type 2 diabetes we will plan for 48 hours of IV cefazolin, followed by 2 weeks of oral antibiotic prophylaxis (Keflex while inpatient; Duricef as outpatient)  Diet: Advance diet as tolerated from orthopedic perspective  GI: Plan for aggressive bowel regimen to prevent constipation from opioid medications  Labs: Hgb 9.5  PT/OT: Eval and treatment. Will follow up on recommendations.  Follow up:  2 weeks with Dr. Waldron for wound check and XR.  Consider referral to Dr. Harrison for bone health evaluation.  Discharge plan: Pending PT/OT evaluation and ongoing workup by medical team      Subjective:  Pain: mild  Nausea, Vomiting:  No  Lightheadedness, Dizziness:  No  Neuro:  Patient denies new onset numbness or paresthesias  Fever, chills: No  Chest pain: No  SOB: No    Patient reports feeling well today. Patient reports pain is tolerable with current pain regimen. Patient eating and drinking well. Patient voiding and passing gas however no BM.  Lying in bed. All questions/concerns answered.      Objective:  /70 (BP Location: Left arm)   Pulse 92   Temp 98.4  F (36.9  C) (Oral)   Resp 18   SpO2 94%     The patient is A&Ox3. Appears comfortable, sitting up at bedside.  Calves without tenderness, neg Shadi's  Brisk capillary refill in the toes.   Palpable Right dorsalis pedis pulse. Right foot warm & well-perfused.  Sensation is intact to light touch & equal bilaterally in the femoral, DP, SP & tibial nerve  distributions.  ROM: Appropriately flexes & extends all toes bilaterally.   Motor: +5/5 dorsiflexion, plantar flexion & EHL bilaterally.   Leg lengths equal.  Dressing C/D/I without strikethrough, no surrounding erythema.      No drain     Pertinent Labs   Lab Results: personally reviewed.   Lab Results   Component Value Date    INR 1.06 12/27/2023    INR  12/27/2023      Comment:      Disregard results. Specimen clotted.  This is a corrected result. Previous result was 1.02 on 12/27/2023 at 12:31 PM CST     Lab Results   Component Value Date    WBC 9.3 12/29/2023    HGB 9.5 (L) 12/29/2023    HCT 29.6 (L) 12/29/2023    MCV 89 12/29/2023     12/29/2023     Lab Results   Component Value Date     12/29/2023    CO2 26 12/29/2023         Report completed by:  KRAIG DOMINGUEZ PA-C  Date: 12/29/2023  [facility]

## 2023-12-29 NOTE — PROGRESS NOTES
[facility]    Progress Note - Hospitalist Service       Date of Admission:  12/27/2023    Assessment & Plan   Claudia Mckeon is a 78 year old female admitted on 12/27/2023. She has a history of uncontrolled T2DM, HTN, HLD and is admitted for right hip fracture after a fall. POD#1 (12/27) from internal fixation of the right hip. Also adjusting her new insulin regimen, likely to discharge to TCU on POD3 with insulin regimen.     Fall  Acute right hip fracture  POD#2 R hip repair   Patient had a fall around 0100 on 12/27. Uses walker at baseline. She fell on her butt, did not hit her head, no LOC, no blood thinners (baby aspirin only). Immediately had right hip pain and was unable to stand up, had to crawl to ask for help. Went to [facility] clinic 12/27 AM and found to have right hip fracture and sent to [facility] ED. On admission, imaging was obtained which showed an intertrochanteric fracture of the right femur. Operative repair on 12/27, now POD#2 and pain present but improving.   - Ortho consult, appreciate their recs              - Operative repair on 12/27              - Pain 5/10 today              - Aspirin 81 mg BID x 4 weeks postop for VTE prophylaxis               - 48 hours of cefazolin and then keflex for 14 days total              - 2 week follow up with Dr. Waldron for wound check and xR              - Consider referral to Dr. Harrison for bone health evaluation.   - PT/OT/SW consults  - Imaging              - CT H and cervical spine: No acute change. Degenerative changes present              - XR R Femur and pelvis: R Intertrochanteric fracture with moderate displacement              - XR R knee: No fracture. Degenerative changes. Osteopenia.   - Pain control:              - Heat, ice, tylenol              - IV dilaudid and PO oxycodone  - Regular diet     Anemia  Patient with hgb of 9.3 on admission with recent value of 13.4 one week ago. MCV 87. No signs of active bleeding on exam  other than into the ecchymosis of her right inner thigh. Hgb stable at 9.5 this AM.  - Daily CBC  - Type and screen performed, consent obtained   - Received rRBC on 12/27 before hip repair      Type 2 DM  A1c over 15 on 12/19/23. Patient on oral medications at home, likely needs insulin therapy at this point. Will hold PTA oral anti-diabetic medications in favor of insulin regimen during stay. Glucose of 93 this AM after lantus 13 unit(s) in the evening. Will revaluate insulin use this evening and adjust her lantus dosing appropriately.   - Holding PTA jardiance and metformin  - PTA gabapentin  - Will use insulin for glucose control while admitted              - TDD of insulin 20 units based on BMI weight based estimation              - Lantus increased to 13 units evening, 3 units mealtime and medium sliding scale   - Would likely benefit from insulin management in outpatient setting     - Please investigate if patient was taking oral medications before admission  - If she was, may restart her Metformin and jardiance today or tomorrow  - If she wasn't may start her on Metformin 500 BID and increase this along with adding jardiance in OP manner           Osteopenia  Osteopenia noted in right knee of imaging today. Do not see history of DEXA scan.   - Could consider DEXA scan in outpatient setting  - Consider referral to Dr. Harrison for bone health evaluation.      Constipation  No bowel movement for 4-5 days now, passing gas, without abdominal pain. Has constipation at baseline but expect it to worsen in setting of decreased mobility and potential opioid use.   - Scheduled senna BID and miralax BID in the setting of hip repair and likely opioid use   - Increased mirlax to BID on 12/29   - May use enema or suppository on 12/30 if patient is still without a BM  - PRN: Senna and miralax  - Will monitor for BM     Chronic:  HTN: Patient had lisinopril on medlist previously but was not taking it on med rec.   HLD: PTA  lipitor             Diet: Advance Diet as Tolerated: Regular Diet Adult  Discharge Instruction - Regular Diet Adult    DVT Prophylaxis: Aspirin 81 mg BID x 4 weeks postop   Faith Catheter: Not present  Fluids: PO  Lines: None     Cardiac Monitoring: None  Code Status: Full Code      Clinically Significant Risk Factors                      # Dementia: noted on problem list   # DMII: A1C = >15.0 % (Ref range: 0.0 - 5.6 %) within past 6 months, PRESENT ON ADMISSION             Disposition Plan      Expected Discharge Date: 12/30/2023    Discharge Delays: Placement - TCU            The patient's care was discussed with the Attending Physician, Dr. Poon .    Jose Bray MD  Hospitalist Service  [facility]  Securely message with [employer] (more info)  Text page via [employer] Paging/Directory   ______________________________________________________________________    Interval History   No bowel movement. Patient having 5/10 pain of her right hip that shoots down the lateral side of her right leg. Took three steps from bed to chair with PT yesterday, limited due to pain.     Patient denies headache, fever, chills, shortness of breath, difficulty breathing, chest pain, nausea, vomiting.      Physical Exam   Vital Signs: Temp: 98.4  F (36.9  C) Temp src: Oral BP: 117/70 Pulse: 92   Resp: 18 SpO2: 94 % O2 Device: None (Room air) Oxygen Delivery: 2 LPM  Weight: 0 lbs 0 oz  GENERAL: Healthy, alert and no distress  EYES: Eyes grossly normal to inspection.  No discharge or erythema, or obvious scleral/conjunctival abnormalities.  RESP:  Lungs clear throughout. No wheeze or crackles.   CV: Heart RRR. No murmur  Abdomen: Soft, nontender, nondistended. No longer palpate firmness in LLQ.   MSK: No gross deformity. Normal tone. Dressings in place over operative wound, improved ecchymoses but  to touch. No warmth. Sensation intact distal to dressings.   SKIN: Visible skin clear. No significant rash,  abnormal pigmentation or lesions.  NEURO: Cranial nerves grossly intact.  Mentation and speech appropriate for age.  PSYCH: Mentation appears normal, affect normal, judgement and insight intact, normal speech and appearance well-groomed.    Medical Decision Making       Please see A&P for additional details of medical decision making.      Data   ------------------------- PAST 24 HR DATA REVIEWED -----------------------------------------------    I have personally reviewed the following data over the past 24 hrs:    9.3  \   9.5 (L)   / 230     139 104 10.8 /  93   3.9 26 0.69 \       Imaging results reviewed over the past 24 hrs:   No results found for this or any previous visit (from the past 24 hour(s)).

## 2023-12-29 NOTE — PLAN OF CARE
Problem: Adult Inpatient Plan of Care  Goal: Absence of Hospital-Acquired Illness or Injury  Outcome: Progressing  Intervention: Identify and Manage Fall Risk  Recent Flowsheet Documentation  Taken 12/28/2023 2108 by Bill Arriaga RN  Safety Promotion/Fall Prevention:   activity supervised   assistive device/personal items within reach   clutter free environment maintained   lighting adjusted   nonskid shoes/slippers when out of bed   patient and family education   room door open   safety round/check completed   supervised activity     Problem: Adult Inpatient Plan of Care  Goal: Optimal Comfort and Wellbeing  Outcome: Progressing  Intervention: Monitor Pain and Promote Comfort  Recent Flowsheet Documentation  Taken 12/28/2023 2052 by Bill Arriaga RN  Pain Management Interventions:   medication (see MAR)   cold applied     Problem: Risk for Delirium  Goal: Optimal Coping  Outcome: Progressing  Goal: Improved Behavioral Control  Outcome: Progressing  Goal: Improved Attention and Thought Clarity  Outcome: Progressing  Goal: Improved Sleep  Outcome: Progressing     Problem: Mobility Impairment  Goal: Optimal Mobility  Outcome: Progressing     Problem: Hip Fracture Surgical Repair  Goal: Optimal Coping with Change in Health Status  Outcome: Progressing  Goal: Absence of Bleeding  Outcome: Progressing  Goal: Effective Bowel Elimination  Outcome: Not Progressing  Goal: Cognitive Function Maintained  Outcome: Progressing  Goal: Fluid and Electrolyte Balance  Outcome: Progressing  Goal: Absence of Infection Signs and Symptoms  Outcome: Progressing  Goal: Optimal Functional Ability  Outcome: Progressing  Goal: Anesthesia/Sedation Recovery  Outcome: Progressing  Intervention: Optimize Anesthesia Recovery  Recent Flowsheet Documentation  Taken 12/28/2023 2108 by Bill Arriaga RN  Safety Promotion/Fall Prevention:   activity supervised   assistive device/personal items within reach   clutter free environment maintained    "lighting adjusted   nonskid shoes/slippers when out of bed   patient and family education   room door open   safety round/check completed   supervised activity  Goal: Optimal Pain Control and Function  Outcome: Progressing  Intervention: Prevent or Manage Pain  Recent Flowsheet Documentation  Taken 12/28/2023 2052 by Bill Arriaga RN  Pain Management Interventions:   medication (see MAR)   cold applied  Goal: Nausea and Vomiting Relief  Outcome: Progressing  Goal: Effective Urinary Elimination  Outcome: Progressing  Goal: Effective Oxygenation and Ventilation  Outcome: Progressing     Pt alert and oriented. Leanne  utilized for assessments. PRN oxycodone given for right hip pain, along w/ scheduled acetaminophen. Ice pack in place. O2 sat 93% on room air. PIV saline lock. HS BG of 150. PRN bisacodyl suppository offered and encouraged pt to take, but pt refused even after education. Pt stated, \"Maybe tomorrow morning or afternoon.\" PRN Miralax and PRN Senna S 1 tab gave along w/ scheduled Senna S 1 tab.  "

## 2023-12-30 LAB
ANION GAP SERPL CALCULATED.3IONS-SCNC: 8 MMOL/L (ref 7–15)
BUN SERPL-MCNC: 13.3 MG/DL (ref 8–23)
CALCIUM SERPL-MCNC: 8.6 MG/DL (ref 8.8–10.2)
CHLORIDE SERPL-SCNC: 101 MMOL/L (ref 98–107)
CREAT SERPL-MCNC: 0.8 MG/DL (ref 0.51–0.95)
DEPRECATED HCO3 PLAS-SCNC: 28 MMOL/L (ref 22–29)
EGFRCR SERPLBLD CKD-EPI 2021: 74 ML/MIN/1.73M2
ERYTHROCYTE [DISTWIDTH] IN BLOOD BY AUTOMATED COUNT: 13.5 % (ref 10–15)
GLUCOSE BLDC GLUCOMTR-MCNC: 126 MG/DL (ref 70–99)
GLUCOSE BLDC GLUCOMTR-MCNC: 142 MG/DL (ref 70–99)
GLUCOSE BLDC GLUCOMTR-MCNC: 263 MG/DL (ref 70–99)
GLUCOSE BLDC GLUCOMTR-MCNC: 295 MG/DL (ref 70–99)
GLUCOSE BLDC GLUCOMTR-MCNC: 350 MG/DL (ref 70–99)
GLUCOSE SERPL-MCNC: 110 MG/DL (ref 70–99)
HCT VFR BLD AUTO: 28 % (ref 35–47)
HGB BLD-MCNC: 8.6 G/DL (ref 11.7–15.7)
MCH RBC QN AUTO: 27.9 PG (ref 26.5–33)
MCHC RBC AUTO-ENTMCNC: 30.7 G/DL (ref 31.5–36.5)
MCV RBC AUTO: 91 FL (ref 78–100)
PLATELET # BLD AUTO: 291 10E3/UL (ref 150–450)
POTASSIUM SERPL-SCNC: 4.1 MMOL/L (ref 3.4–5.3)
RBC # BLD AUTO: 3.08 10E6/UL (ref 3.8–5.2)
SODIUM SERPL-SCNC: 137 MMOL/L (ref 135–145)
WBC # BLD AUTO: 11.6 10E3/UL (ref 4–11)

## 2023-12-30 PROCEDURE — 85014 HEMATOCRIT: CPT

## 2023-12-30 PROCEDURE — 250N000013 HC RX MED GY IP 250 OP 250 PS 637: Performed by: FAMILY MEDICINE

## 2023-12-30 PROCEDURE — 99232 SBSQ HOSP IP/OBS MODERATE 35: CPT | Mod: GC

## 2023-12-30 PROCEDURE — 250N000013 HC RX MED GY IP 250 OP 250 PS 637

## 2023-12-30 PROCEDURE — 250N000013 HC RX MED GY IP 250 OP 250 PS 637: Performed by: STUDENT IN AN ORGANIZED HEALTH CARE EDUCATION/TRAINING PROGRAM

## 2023-12-30 PROCEDURE — 120N000001 HC R&B MED SURG/OB

## 2023-12-30 PROCEDURE — 80048 BASIC METABOLIC PNL TOTAL CA: CPT

## 2023-12-30 PROCEDURE — 36415 COLL VENOUS BLD VENIPUNCTURE: CPT

## 2023-12-30 RX ORDER — METFORMIN HCL 500 MG
500 TABLET, EXTENDED RELEASE 24 HR ORAL 2 TIMES DAILY WITH MEALS
Status: DISCONTINUED | OUTPATIENT
Start: 2023-12-30 | End: 2023-12-30

## 2023-12-30 RX ORDER — CEPHALEXIN 500 MG/1
500 CAPSULE ORAL EVERY 8 HOURS SCHEDULED
Status: DISCONTINUED | OUTPATIENT
Start: 2023-12-30 | End: 2023-12-31

## 2023-12-30 RX ORDER — METFORMIN HCL 500 MG
500 TABLET, EXTENDED RELEASE 24 HR ORAL 2 TIMES DAILY WITH MEALS
Status: DISCONTINUED | OUTPATIENT
Start: 2023-12-30 | End: 2024-01-02 | Stop reason: HOSPADM

## 2023-12-30 RX ADMIN — INSULIN ASPART 3 UNITS: 100 INJECTION, SOLUTION INTRAVENOUS; SUBCUTANEOUS at 12:39

## 2023-12-30 RX ADMIN — SENNOSIDES AND DOCUSATE SODIUM 1 TABLET: 50; 8.6 TABLET ORAL at 10:30

## 2023-12-30 RX ADMIN — OXYCODONE HYDROCHLORIDE 5 MG: 5 TABLET ORAL at 07:44

## 2023-12-30 RX ADMIN — ACETAMINOPHEN 975 MG: 325 TABLET ORAL at 05:56

## 2023-12-30 RX ADMIN — OXYCODONE HYDROCHLORIDE 5 MG: 5 TABLET ORAL at 11:57

## 2023-12-30 RX ADMIN — CEPHALEXIN 500 MG: 500 CAPSULE ORAL at 05:56

## 2023-12-30 RX ADMIN — OXYCODONE HYDROCHLORIDE 5 MG: 5 TABLET ORAL at 21:18

## 2023-12-30 RX ADMIN — Medication 81 MG: at 10:27

## 2023-12-30 RX ADMIN — CEPHALEXIN 500 MG: 500 CAPSULE ORAL at 11:57

## 2023-12-30 RX ADMIN — POLYETHYLENE GLYCOL 3350 17 G: 17 POWDER, FOR SOLUTION ORAL at 10:28

## 2023-12-30 RX ADMIN — ACETAMINOPHEN 975 MG: 325 TABLET ORAL at 12:40

## 2023-12-30 RX ADMIN — CEPHALEXIN 500 MG: 500 CAPSULE ORAL at 20:04

## 2023-12-30 RX ADMIN — CEPHALEXIN 500 MG: 500 CAPSULE ORAL at 00:07

## 2023-12-30 RX ADMIN — POLYETHYLENE GLYCOL 3350 17 G: 17 POWDER, FOR SOLUTION ORAL at 21:19

## 2023-12-30 RX ADMIN — Medication 81 MG: at 21:19

## 2023-12-30 RX ADMIN — METFORMIN ER 500 MG 500 MG: 500 TABLET ORAL at 20:04

## 2023-12-30 RX ADMIN — INSULIN ASPART 4 UNITS: 100 INJECTION, SOLUTION INTRAVENOUS; SUBCUTANEOUS at 17:20

## 2023-12-30 RX ADMIN — OXYCODONE HYDROCHLORIDE 5 MG: 5 TABLET ORAL at 16:28

## 2023-12-30 RX ADMIN — SENNOSIDES AND DOCUSATE SODIUM 1 TABLET: 50; 8.6 TABLET ORAL at 21:18

## 2023-12-30 ASSESSMENT — ACTIVITIES OF DAILY LIVING (ADL)
ADLS_ACUITY_SCORE: 24
ADLS_ACUITY_SCORE: 28
ADLS_ACUITY_SCORE: 28
ADLS_ACUITY_SCORE: 24
ADLS_ACUITY_SCORE: 24
ADLS_ACUITY_SCORE: 28

## 2023-12-30 NOTE — PROGRESS NOTES
[facility]    Medicine Progress Note - Hospitalist Service    Date of Admission:  12/27/2023    Assessment & Plan   Claudia Mckeon is a 78 year old female admitted on 12/27/2023. She has a history of uncontrolled T2DM, HTN, HLD and is admitted for right hip fracture after a fall. Underwent internal fixation of the right hip on day of admission. Also adjusting her new insulin regimen, likely to discharge to TCU on POD3 with insulin regimen.     Fall  Acute right hip fracture  POD#3 R hip repair   Patient had a fall around 0100 on 12/27. Uses walker at baseline. She fell on her butt, did not hit her head, no LOC, no blood thinners (baby aspirin only). Immediately had right hip pain and was unable to stand up, had to crawl to ask for help. Went to [facility] 12/27 AM and found to have right hip fracture and sent to [facility] ED. On admission, imaging was obtained which showed an intertrochanteric fracture of the right femur. Operative repair on 12/27, now POD#3 w/ px well controlled. Plan to call family and discuss discharge to home vs TCU and go over insulin teaching/plan.  - Ortho consult, appreciate their recs              - Operative repair on 12/27              - Pain 5/10 today              - Aspirin 81 mg BID x 4 weeks postop for VTE prophylaxis               - 48 hours of cefazolin and then keflex for 14 days total (last dose 1/13)              - 2 week follow up with Dr. Waldron for wound check and xR              - Consider referral to Dr. Harrison for bone health evaluation.   - PT/OT/SW consults  - Imaging              - CT H and cervical spine: No acute change. Degenerative changes present              - XR R Femur and pelvis: R Intertrochanteric fracture with moderate displacement              - XR R knee: No fracture. Degenerative changes. Osteopenia.   - Pain control:              - Heat, ice, tylenol              - IV dilaudid and PO oxycodone  - Regular diet     Anemia  Patient with  hgb of 9.3 on admission with recent value of 13.4 one week ago. MCV 87. No signs of active bleeding on exam other than into the ecchymosis of her right inner thigh. Hgb stable at 9.5 this AM.  - Daily CBC  - Type and screen performed, consent obtained   - Received rRBC on 12/27 before hip repair      Type 2 DM  A1c over 15 on 12/19/23. Patient on oral medications at home, likely needs insulin therapy at this point. Will hold PTA oral anti-diabetic medications in favor of insulin regimen during stay. Glucose of 93 this AM after lantus 13 unit(s) in the evening. Will revaluate insulin use this evening and adjust her lantus dosing appropriately. 12/30 it appears that we are not adequately covering meal time carbs as pt still needing 12u sliding scale insulin corrections during the day, basal dosage appears adequate given BGs in the low 100s in the AM. May actually need to go down on this while re introducing metformin.  - PTA gabapentin  - Will use insulin for glucose control while admitted              - TDD of insulin 20 units based on BMI weight based estimation              - Lantus increased to 13 units evening, 3 units mealtime and medium sliding scale   - Would likely benefit from insulin management in outpatient setting     - 12/30 discussed w/ pharmacy and pt has not filled metformin script since February of last year. Will plan to sequentially add this and SGLT2 back on. Will need to discuss discharge planning w/ family and whether prandial bolus insulin is feasible or sticking w/ just basal insulin and oral antihyperglycemics.    Osteopenia  Osteopenia noted in right knee of imaging today. Do not see history of DEXA scan.   - Could consider DEXA scan in outpatient setting  - Consider referral to Dr. Harrison for bone health evaluation.      Constipation, improving  No bowel movement for 4-5 days on admission, passing gas, without abdominal pain. Has constipation at baseline but expect it to worsen in setting  of decreased mobility and potential opioid use. Has now had BM x2 since 12/29.  - Scheduled senna BID and miralax BID in the setting of hip repair and likely opioid use   - Increased mirlax to BID on 12/29   - May use enema or suppository on 12/30 if patient is still without a BM  - PRN: Senna and miralax  - Will monitor for BM     Chronic:  HTN: Patient had lisinopril on medlist previously but was not taking it on med rec.   HLD: PTA lipitor          Diet: Advance Diet as Tolerated: Regular Diet Adult  Discharge Instruction - Regular Diet Adult    DVT Prophylaxis:  Aspirin 81 mg BID x 4 weeks postop   Faith Catheter: Not present  Lines: None     Cardiac Monitoring: None  Code Status: Full Code      Clinically Significant Risk Factors                      # Dementia: noted on problem list   # DMII: A1C = >15.0 % (Ref range: 0.0 - 5.6 %) within past 6 months, PRESENT ON ADMISSION             Disposition Plan     Expected Discharge Date: 01/01/2024    Discharge Delays: Placement - TCU              The patient's care was discussed with the Attending Physician, Dr. Poon .    Mateus Medley MD  Hospitalist Service  [facility]  Securely message with [employer] (more info)  Text page via [employer] Paging/Directory   ______________________________________________________________________    Interval History   Uneventful night. Pt reports px is very well controlled on current regimen. Insulin is going well, reports she was scared the first few doses but now is okay with regular injections. Unfortunately family not at bedside today to go over discharge insulin planning.    Physical Exam   Vital Signs: Temp: 98  F (36.7  C) Temp src: Oral BP: 103/51 Pulse: 83   Resp: 18 SpO2: 96 % O2 Device: None (Room air)    Weight: 0 lbs 0 oz    GENERAL: Healthy, alert and no distress  EYES: Eyes grossly normal to inspection.  No discharge or erythema, or obvious scleral/conjunctival abnormalities.  RESP:  Lungs clear  throughout. No wheeze or crackles.   CV: Heart RRR. No murmur  Abdomen: Soft, nontender, nondistended. No longer palpate firmness in LLQ.   MSK: No gross deformity. Normal tone. Dressings in place over operative wound, improved ecchymoses but  to touch. No warmth. Sensation intact distal to dressings.   SKIN: Visible skin clear. No significant rash, abnormal pigmentation or lesions.  NEURO: Cranial nerves grossly intact.  Mentation and speech appropriate for age.  PSYCH: Mentation appears normal, affect normal, judgement and insight intact, normal speech and appearance well-groomed.    Data     I have personally reviewed the following data over the past 24 hrs:    11.6 (H)  \   8.6 (L)   / 291     137 101 13.3 /  295 (H)   4.1 28 0.80 \       Imaging results reviewed over the past 24 hrs:   No results found for this or any previous visit (from the past 24 hour(s)).

## 2023-12-30 NOTE — PROGRESS NOTES
Orthopedic Progress Note    Subjective:  No acute events.     Objective:  /60 (BP Location: Left arm)   Pulse 84   Temp 98  F (36.7  C) (Oral)   Resp 18   SpO2 92%   The patient is A&Ox3. Appears comfortable.   Sensation is intact.  Dorsiflexion and plantar flexion is intact.  Dorsalis pedis pulse intact.  Calves are soft and non-tender. Negative Shadi's.  The incision is covered. Dressing C/D/I.    Assessment: 3 Days Post-Op  S/P Procedure(s):  INTERNAL FIXATION, FRACTURE, TROCHANTERIC, RIGHT HIP, USING RODS    Plan:   Pain Control: Continue per multimodal pain protocol. Minimize opioid pain medications as able.  Weight Bearing: Weight bearing as tolerated  Precautions: Recommend walker at all times  DVT Prophylaxis: Risk stratified DVT prophylaxis. Aspirin 81 mg BID x 4 weeks postop. Early ambulation and SCDs while in bed.  Antibiotics: Given high risk and uncontrolled type 2 diabetes we will plan for 48 hours of IV cefazolin, followed by 2 weeks of oral antibiotic prophylaxis (Keflex while inpatient; Duricef as outpatient)  Diet: Advance diet as tolerated from orthopedic perspective  PT/OT: Eval and treatment. Will follow up on recommendations.  Follow up:  2 weeks with Dr. Waldron for wound check and XR.  Consider referral to Dr. Harrison for bone health evaluation.  Discharge plan: Pending PT/OT evaluation and ongoing workup by medical team    Report completed by:  Jacobo Mirza MD  Date: 12/30/2023  Time: 8:17 AM

## 2023-12-30 NOTE — PLAN OF CARE
Problem: Adult Inpatient Plan of Care  Goal: Plan of Care Review  Description: The Plan of Care Review/Shift note should be completed every shift.  The Outcome Evaluation is a brief statement about your assessment that the patient is improving, declining, or no change.  This information will be displayed automatically on your shift  note.  Outcome: Progressing     Problem: Mobility Impairment  Goal: Optimal Mobility  Outcome: Progressing     Problem: Hip Fracture Surgical Repair  Goal: Optimal Pain Control and Function  Outcome: Progressing     Problem: Comorbidity Management  Goal: Blood Glucose Levels Within Targeted Range  Outcome: Progressing   Goal Outcome Evaluation:       Pt alert & oriented. Leanne speaking. Language services utilizes. Dressing Rt leg clean, dry & intact. Prn oxycodone given for pain and scheduled tylenol with relief. Assist of 2 on transfers. Blood sugar was high 417, corrected with insulin sliding scale. Recheck was 367. House officer notified with no new orders. Blood sugar at 02:00 was 142. On room air, Vital Signs stable. Oral abx given as scheduled. Will continue to monitor.

## 2023-12-30 NOTE — PROGRESS NOTES
Care Management Follow Up    Length of Stay (days): 3    Expected Discharge Date: 01/01/2024     Concerns to be Addressed:  patient agrees with Leanne AMAYA  Patient plan of care discussed at interdisciplinary rounds: Yes    Anticipated Discharge Disposition:  TCU     Anticipated Discharge Services:  TCU  Anticipated Discharge DME:      Patient/family educated on Medicare website which has current facility and service quality ratings:  yes  Education Provided on the Discharge Plan:  yes  Patient/Family in Agreement with the Plan: yes     Referrals Placed by CM/SW:  on lists. PAS needed  Private pay costs discussed: Not applicable    Additional Information:  Using [employer] and [employer]ishma, [phone]. Patient agrees with TCU and is on lists. At baseline she is alert,oriented and independent with most ADLs, Transport TBD. CM following    MARIOLA Mcwilliams

## 2023-12-30 NOTE — PLAN OF CARE
"Problem: Hip Fracture Surgical Repair  Goal: Absence of Bleeding  Outcome: Progressing  Surgical dressings clean, dry and intact.  Significant bruising present to right thigh/hip region.  Ice packs applied intermittently.    Problem: Hip Fracture Surgical Repair  Goal: Optimal Pain Control and Function  Outcome: Progressing  Patient reported 10/10 right hip pain. Describes pain as \"burning\".  PRN Oxycodone given - patient states this intervention was effective.     Problem: Hip Fracture Surgical Repair  Goal: Optimal Functional Ability  Outcome: Progressing  Up to chair for dinner - 2 staff assist + walker + belt.    Problem: Hip Fracture Surgical Repair  Goal: Effective Urinary Elimination  Outcome: Progressing  Voiding without difficulty using purewick catheter.    Problem: Hip Fracture Surgical Repair  Goal: Effective Bowel Elimination  Outcome: Progressing  Patient had bowel movement at end of previous shift.    Tolerating regular diet.    Jessie Wolf RN      "

## 2023-12-31 LAB
ANION GAP SERPL CALCULATED.3IONS-SCNC: 7 MMOL/L (ref 7–15)
BUN SERPL-MCNC: 13.2 MG/DL (ref 8–23)
CALCIUM SERPL-MCNC: 9.1 MG/DL (ref 8.8–10.2)
CHLORIDE SERPL-SCNC: 98 MMOL/L (ref 98–107)
CREAT SERPL-MCNC: 0.72 MG/DL (ref 0.51–0.95)
DEPRECATED HCO3 PLAS-SCNC: 31 MMOL/L (ref 22–29)
EGFRCR SERPLBLD CKD-EPI 2021: 84 ML/MIN/1.73M2
ERYTHROCYTE [DISTWIDTH] IN BLOOD BY AUTOMATED COUNT: 13.4 % (ref 10–15)
GLUCOSE BLDC GLUCOMTR-MCNC: 179 MG/DL (ref 70–99)
GLUCOSE BLDC GLUCOMTR-MCNC: 223 MG/DL (ref 70–99)
GLUCOSE BLDC GLUCOMTR-MCNC: 224 MG/DL (ref 70–99)
GLUCOSE BLDC GLUCOMTR-MCNC: 300 MG/DL (ref 70–99)
GLUCOSE BLDC GLUCOMTR-MCNC: 329 MG/DL (ref 70–99)
GLUCOSE SERPL-MCNC: 262 MG/DL (ref 70–99)
HCT VFR BLD AUTO: 28 % (ref 35–47)
HGB BLD-MCNC: 8.7 G/DL (ref 11.7–15.7)
MCH RBC QN AUTO: 28.3 PG (ref 26.5–33)
MCHC RBC AUTO-ENTMCNC: 31.1 G/DL (ref 31.5–36.5)
MCV RBC AUTO: 91 FL (ref 78–100)
PLATELET # BLD AUTO: 338 10E3/UL (ref 150–450)
POTASSIUM SERPL-SCNC: 4.2 MMOL/L (ref 3.4–5.3)
RBC # BLD AUTO: 3.07 10E6/UL (ref 3.8–5.2)
SODIUM SERPL-SCNC: 136 MMOL/L (ref 135–145)
WBC # BLD AUTO: 10.5 10E3/UL (ref 4–11)

## 2023-12-31 PROCEDURE — 36415 COLL VENOUS BLD VENIPUNCTURE: CPT

## 2023-12-31 PROCEDURE — 250N000013 HC RX MED GY IP 250 OP 250 PS 637

## 2023-12-31 PROCEDURE — 99232 SBSQ HOSP IP/OBS MODERATE 35: CPT | Mod: GC

## 2023-12-31 PROCEDURE — 250N000013 HC RX MED GY IP 250 OP 250 PS 637: Performed by: STUDENT IN AN ORGANIZED HEALTH CARE EDUCATION/TRAINING PROGRAM

## 2023-12-31 PROCEDURE — 250N000013 HC RX MED GY IP 250 OP 250 PS 637: Performed by: FAMILY MEDICINE

## 2023-12-31 PROCEDURE — 85027 COMPLETE CBC AUTOMATED: CPT

## 2023-12-31 PROCEDURE — 80048 BASIC METABOLIC PNL TOTAL CA: CPT

## 2023-12-31 PROCEDURE — 120N000001 HC R&B MED SURG/OB

## 2023-12-31 RX ORDER — ATORVASTATIN CALCIUM 10 MG/1
20 TABLET, FILM COATED ORAL DAILY
Status: DISCONTINUED | OUTPATIENT
Start: 2023-12-31 | End: 2024-01-02 | Stop reason: HOSPADM

## 2023-12-31 RX ORDER — CEPHALEXIN 500 MG/1
500 CAPSULE ORAL EVERY 6 HOURS SCHEDULED
Status: DISCONTINUED | OUTPATIENT
Start: 2023-12-31 | End: 2024-01-02 | Stop reason: HOSPADM

## 2023-12-31 RX ADMIN — METFORMIN ER 500 MG 500 MG: 500 TABLET ORAL at 17:24

## 2023-12-31 RX ADMIN — METFORMIN ER 500 MG 500 MG: 500 TABLET ORAL at 08:13

## 2023-12-31 RX ADMIN — OXYCODONE HYDROCHLORIDE 5 MG: 5 TABLET ORAL at 02:01

## 2023-12-31 RX ADMIN — OXYCODONE HYDROCHLORIDE 5 MG: 5 TABLET ORAL at 12:17

## 2023-12-31 RX ADMIN — Medication 81 MG: at 21:30

## 2023-12-31 RX ADMIN — INSULIN ASPART 2 UNITS: 100 INJECTION, SOLUTION INTRAVENOUS; SUBCUTANEOUS at 08:14

## 2023-12-31 RX ADMIN — SENNOSIDES AND DOCUSATE SODIUM 1 TABLET: 50; 8.6 TABLET ORAL at 08:13

## 2023-12-31 RX ADMIN — CEPHALEXIN 500 MG: 500 CAPSULE ORAL at 06:00

## 2023-12-31 RX ADMIN — ATORVASTATIN CALCIUM 20 MG: 10 TABLET, FILM COATED ORAL at 12:18

## 2023-12-31 RX ADMIN — ACETAMINOPHEN 650 MG: 325 TABLET, FILM COATED ORAL at 17:05

## 2023-12-31 RX ADMIN — POLYETHYLENE GLYCOL 3350 17 G: 17 POWDER, FOR SOLUTION ORAL at 21:30

## 2023-12-31 RX ADMIN — EMPAGLIFLOZIN 10 MG: 10 TABLET, FILM COATED ORAL at 17:24

## 2023-12-31 RX ADMIN — POLYETHYLENE GLYCOL 3350 17 G: 17 POWDER, FOR SOLUTION ORAL at 08:13

## 2023-12-31 RX ADMIN — Medication 81 MG: at 08:13

## 2023-12-31 RX ADMIN — SENNOSIDES AND DOCUSATE SODIUM 1 TABLET: 50; 8.6 TABLET ORAL at 21:30

## 2023-12-31 RX ADMIN — CEPHALEXIN 500 MG: 500 CAPSULE ORAL at 12:18

## 2023-12-31 RX ADMIN — CEPHALEXIN 500 MG: 500 CAPSULE ORAL at 18:01

## 2023-12-31 RX ADMIN — INSULIN ASPART 4 UNITS: 100 INJECTION, SOLUTION INTRAVENOUS; SUBCUTANEOUS at 18:00

## 2023-12-31 RX ADMIN — CEPHALEXIN 500 MG: 500 CAPSULE ORAL at 23:54

## 2023-12-31 RX ADMIN — INSULIN ASPART 2 UNITS: 100 INJECTION, SOLUTION INTRAVENOUS; SUBCUTANEOUS at 12:21

## 2023-12-31 ASSESSMENT — ACTIVITIES OF DAILY LIVING (ADL)
ADLS_ACUITY_SCORE: 24

## 2023-12-31 NOTE — PROGRESS NOTES
[facility]    Medicine Progress Note - Hospitalist Service    Date of Admission:  12/27/2023    Assessment & Plan   Claudia Mckeon is a 78 year old female admitted on 12/27/2023. She has a history of uncontrolled T2DM, dementia, HTN, HLD and is admitted for right hip fracture after a fall. Underwent internal fixation of the right hip on 12/17/23. Poorly controlled T2DM, titrating insulin regimen. Medically ready for discharge; awaiting TCU placement.      Fall  Acute right hip fracture  POD#3 R hip repair   Patient had a fall around 0100 on 12/27. Uses walker at baseline. She fell on her butt, did not hit her head, no LOC, no blood thinners (baby aspirin only). Immediately had right hip pain and was unable to stand up, had to crawl to ask for help. Went to [facility] clinic 12/27 AM and found to have right hip fracture and sent to [facility] ED. On admission, imaging was obtained which showed an intertrochanteric fracture of the right femur. Operative repair on 12/27, now POD#4. Awaiting TCU placement.  - Ortho consult, appreciate their recs              - S/p operative repair on 12/27              - Aspirin 81 mg BID x 4 weeks postop for VTE prophylaxis               - 48 hours of cefazolin and then keflex for 14 days total (transition to duricef at discharge, last dose 1/13)              - 2 week follow up with Dr. Waldron for wound check and xR              - Consider referral to Dr. Harrison for bone health evaluation.   - PT/OT/SW consults  - Imaging:              - CT H and cervical spine: No acute change. Degenerative changes present              - XR R Femur and pelvis: R Intertrochanteric fracture with moderate displacement              - XR R knee: No fracture. Degenerative changes. Osteopenia.   - Pain control:              - Heat, ice, tylenol              - IV dilaudid and PO oxycodone  - Regular diet    Type 2 DM  A1c over 15% on 12/19/23. Per pharmacy review, patient has not filled metformin  or SGLT2 prescriptions since Feb. Working on restarting these PO medications. Started insulin during hospitalization. Continues to be hyperglycemic. Unclear if family will be able to manage prandial insulin at home following discharge from TCU.   - Lantus 13 units at bedtime   - Novolog 5 units with meals   - Novolog medium resistance SSI   - Restarted metformin 500 mg BID on 12/30 (work toward max dose as tolerated)   - Restart Jardiance 10 mg daily   - Check BG QID  - PTA gabapentin for neuropathy symptoms      Anemia  Patient with hgb of 9.3 on admission with recent value of 13.4 one week ago. MCV 87. No signs of active bleeding on exam other than into the ecchymosis of her right inner thigh. Hgb stable 8.7.   - Daily CBC  - Type and screen performed, consent obtained     Osteopenia  Osteopenia noted in right knee of imaging today. Do not see history of DEXA scan.   - Could consider DEXA scan in outpatient setting  - Consider referral to Dr. Harrison for bone health evaluation.      Constipation, improving  No bowel movement for 4-5 days on admission, passing gas, without abdominal pain. Has constipation at baseline but expect it to worsen in setting of decreased mobility and potential opioid use. Has now had BM x2 since 12/29.  - Scheduled senna BID and miralax BID in the setting of hip repair and likely opioid use     Chronic:  HTN: Patient had lisinopril on medlist previously but was not taking it on med rec.   HLD: PTA lipitor          Diet: Discharge Instruction - Regular Diet Adult  Moderate Consistent Carb (60 g CHO per Meal) Diet    DVT Prophylaxis:  Aspirin 81 mg BID x 4 weeks postop   Faith Catheter: Not present  Lines: None     Cardiac Monitoring: None  Code Status: Full Code      Clinically Significant Risk Factors                      # Dementia: noted on problem list   # DMII: A1C = >15.0 % (Ref range: 0.0 - 5.6 %) within past 6 months              Disposition Plan      Expected Discharge Date:  01/02/2024    Discharge Delays: Placement - TCU              The patient's care was discussed with the Attending Physician, Dr. Poon .    Joellen Narvaez MD  Hospitalist Service  [facility]  Securely message with [employer] (more info)  Text page via [facility] Paging/Directory   ______________________________________________________________________    Interval History   Uneventful night. Reports signifciant hip pain this morning, resolved with PRN oxycodone and ice. Denies shortness of breath, chest pain. Does feel fatigued.     Physical Exam   Vital Signs: Temp: 99.2  F (37.3  C) Temp src: Oral BP: 112/65 Pulse: 103   Resp: 18 SpO2: 93 % O2 Device: None (Room air)    Weight: 0 lbs 0 oz    GENERAL: Healthy, alert and no distress  EYES: Eyes grossly normal to inspection.  No discharge or erythema, or obvious scleral/conjunctival abnormalities.  RESP:  Lungs clear throughout. No wheeze or crackles.   CV: Heart RRR. No murmur  Abdomen: Soft, nontender, nondistended.  MSK: No gross deformity. Normal tone. Dressings in place over operative wound, improved ecchymoses but  to touch. No warmth. Sensation intact distal to dressings.   SKIN: Visible skin clear. No significant rash, abnormal pigmentation or lesions.  NEURO: Cranial nerves grossly intact.  Mentation and speech appropriate for age.  PSYCH: Mentation appears normal, affect normal, judgement and insight intact, normal speech and appearance well-groomed.    Data     I have personally reviewed the following data over the past 24 hrs:    10.5  \   8.7 (L)   / 338     136 98 13.2 /  224 (H)   4.2 31 (H) 0.72 \       Imaging results reviewed over the past 24 hrs:   No results found for this or any previous visit (from the past 24 hour(s)).

## 2023-12-31 NOTE — PROGRESS NOTES
Orthopedic Progress Note    Subjective:  No acute events overnight.  Patient just went from sitting the chair for a while back to bed with assist of nursing staff.  Pain is controlled.    Objective:  /65 (BP Location: Left arm)   Pulse 103   Temp 99.2  F (37.3  C) (Oral)   Resp 18   SpO2 93%   Resting comfortably in bed  Sensation is intact in the right thumb plantar foot  Dorsiflexion and plantar flexion is intact the right foot/ankle  Dorsalis pedis pulse intact in the right  The incision is covered. Dressing C/D/I.    Labs:  Hgb 8.7 12/31/2023  Blood glucose values continue to range from the 100s to the 300s -- ongoing titration by the medical team.    Assessment:   78-year-old female who sustained a right intertrochanteric femur fracture now status post right femur trochanteric fixation on 12/27/2023.    Progressing appropriately postoperative 4. Blood glucose values continue to range from the 100s to the 300s -- ongoing titration by the medical team    Plan:   Pain Control: Continue per multimodal pain protocol. Minimize opioid pain medications as able.  Weight Bearing: Weight bearing as tolerated  Precautions: Recommend walker at all times  DVT Prophylaxis: Risk stratified DVT prophylaxis. Aspirin 81 mg BID x 4 weeks postop. Early ambulation and SCDs while in bed.  Antibiotics: Given high risk and uncontrolled type 2 diabetes we will plan for 48 hours of IV cefazolin, followed by 2 weeks of oral antibiotic prophylaxis (Keflex while inpatient; Duricef as outpatient)  Diet: Advance diet as tolerated from orthopedic perspective  PT/OT: Continue to mobilize as able  Follow up:  2 weeks with Dr. Waldron for wound check and XR.  Consider referral to Dr. Harrison for bone health evaluation.  Discharge plan: Pending PT/OT evaluation and ongoing workup by medical team.  Anticipate discharge to TCU.  Case management is following.    Eugenio Waldron MD  Orthopedic Surgery  [facility]

## 2023-12-31 NOTE — PLAN OF CARE
Problem: Adult Inpatient Plan of Care  Goal: Plan of Care Review  Description: The Plan of Care Review/Shift note should be completed every shift.  The Outcome Evaluation is a brief statement about your assessment that the patient is improving, declining, or no change.  This information will be displayed automatically on your shift  note.  Outcome: Progressing     Problem: Adult Inpatient Plan of Care  Goal: Readiness for Transition of Care  Outcome: Progressing     Problem: Mobility Impairment  Goal: Optimal Mobility  Outcome: Progressing     Problem: Hip Fracture Surgical Repair  Goal: Optimal Pain Control and Function  Outcome: Progressing     Problem: Hip Fracture Surgical Repair  Goal: Effective Urinary Elimination  Outcome: Progressing     Problem: Hip Fracture Surgical Repair  Goal: Effective Oxygenation and Ventilation  Outcome: Progressing     Problem: Comorbidity Management  Goal: Blood Glucose Levels Within Targeted Range  Outcome: Progressing   Goal Outcome Evaluation:       Pt alert & oriented. Leanne speaking. Pain managed by prn oxycodone 5mg and ice pack with relief. Oral abx given as prescribed. Purewick in place. Dressing clean, dry and intact. On room air, Vital Signs stable. Will continue to monitor.

## 2023-12-31 NOTE — PLAN OF CARE
Problem: Hip Fracture Surgical Repair  Goal: Optimal Pain Control and Function  Outcome: Progressing  Patient reports improvement in pain management.  This shift: scheduled Tylenol given, PRN Oxycodone given x2, ice packs applied intermittently. All interventions described as beneficial per patient.    Problem: Hip Fracture Surgical Repair  Goal: Optimal Functional Ability  Outcome: Progressing  Up to chair for breakfast - tolerated transfer better than previous day.  PT/OT following for ongoing rehabilitation.    Problem: Comorbidity Management  Goal: Blood Glucose Levels Within Targeted Range  Outcome: Progressing  Blood glucose levels 126 & 263.   Insulin given as ordered.    Jessie Wolf RN

## 2024-01-01 LAB
ANION GAP SERPL CALCULATED.3IONS-SCNC: 6 MMOL/L (ref 7–15)
BUN SERPL-MCNC: 13.4 MG/DL (ref 8–23)
CALCIUM SERPL-MCNC: 9.4 MG/DL (ref 8.8–10.2)
CHLORIDE SERPL-SCNC: 101 MMOL/L (ref 98–107)
CREAT SERPL-MCNC: 0.81 MG/DL (ref 0.51–0.95)
DEPRECATED HCO3 PLAS-SCNC: 32 MMOL/L (ref 22–29)
EGFRCR SERPLBLD CKD-EPI 2021: 73 ML/MIN/1.73M2
ERYTHROCYTE [DISTWIDTH] IN BLOOD BY AUTOMATED COUNT: 13.5 % (ref 10–15)
GLUCOSE BLDC GLUCOMTR-MCNC: 119 MG/DL (ref 70–99)
GLUCOSE BLDC GLUCOMTR-MCNC: 132 MG/DL (ref 70–99)
GLUCOSE BLDC GLUCOMTR-MCNC: 147 MG/DL (ref 70–99)
GLUCOSE BLDC GLUCOMTR-MCNC: 219 MG/DL (ref 70–99)
GLUCOSE BLDC GLUCOMTR-MCNC: 263 MG/DL (ref 70–99)
GLUCOSE BLDC GLUCOMTR-MCNC: 306 MG/DL (ref 70–99)
GLUCOSE SERPL-MCNC: 141 MG/DL (ref 70–99)
HCT VFR BLD AUTO: 29.9 % (ref 35–47)
HGB BLD-MCNC: 9.4 G/DL (ref 11.7–15.7)
MCH RBC QN AUTO: 28.2 PG (ref 26.5–33)
MCHC RBC AUTO-ENTMCNC: 31.4 G/DL (ref 31.5–36.5)
MCV RBC AUTO: 90 FL (ref 78–100)
PLATELET # BLD AUTO: 402 10E3/UL (ref 150–450)
POTASSIUM SERPL-SCNC: 4.4 MMOL/L (ref 3.4–5.3)
RBC # BLD AUTO: 3.33 10E6/UL (ref 3.8–5.2)
SODIUM SERPL-SCNC: 139 MMOL/L (ref 135–145)
WBC # BLD AUTO: 10.1 10E3/UL (ref 4–11)

## 2024-01-01 PROCEDURE — 80048 BASIC METABOLIC PNL TOTAL CA: CPT

## 2024-01-01 PROCEDURE — 250N000013 HC RX MED GY IP 250 OP 250 PS 637

## 2024-01-01 PROCEDURE — 85027 COMPLETE CBC AUTOMATED: CPT

## 2024-01-01 PROCEDURE — 120N000001 HC R&B MED SURG/OB

## 2024-01-01 PROCEDURE — 250N000013 HC RX MED GY IP 250 OP 250 PS 637: Performed by: STUDENT IN AN ORGANIZED HEALTH CARE EDUCATION/TRAINING PROGRAM

## 2024-01-01 PROCEDURE — 250N000013 HC RX MED GY IP 250 OP 250 PS 637: Performed by: FAMILY MEDICINE

## 2024-01-01 PROCEDURE — 99232 SBSQ HOSP IP/OBS MODERATE 35: CPT | Mod: GC

## 2024-01-01 PROCEDURE — 36415 COLL VENOUS BLD VENIPUNCTURE: CPT

## 2024-01-01 RX ADMIN — POLYETHYLENE GLYCOL 3350 17 G: 17 POWDER, FOR SOLUTION ORAL at 08:28

## 2024-01-01 RX ADMIN — SENNOSIDES AND DOCUSATE SODIUM 1 TABLET: 50; 8.6 TABLET ORAL at 08:27

## 2024-01-01 RX ADMIN — CEPHALEXIN 500 MG: 500 CAPSULE ORAL at 17:34

## 2024-01-01 RX ADMIN — METFORMIN ER 500 MG 500 MG: 500 TABLET ORAL at 17:34

## 2024-01-01 RX ADMIN — Medication 81 MG: at 21:46

## 2024-01-01 RX ADMIN — Medication 81 MG: at 08:27

## 2024-01-01 RX ADMIN — ACETAMINOPHEN 650 MG: 325 TABLET, FILM COATED ORAL at 17:34

## 2024-01-01 RX ADMIN — ACETAMINOPHEN 650 MG: 325 TABLET, FILM COATED ORAL at 08:28

## 2024-01-01 RX ADMIN — EMPAGLIFLOZIN 10 MG: 10 TABLET, FILM COATED ORAL at 08:28

## 2024-01-01 RX ADMIN — INSULIN ASPART 2 UNITS: 100 INJECTION, SOLUTION INTRAVENOUS; SUBCUTANEOUS at 17:35

## 2024-01-01 RX ADMIN — ATORVASTATIN CALCIUM 20 MG: 10 TABLET, FILM COATED ORAL at 08:27

## 2024-01-01 RX ADMIN — CEPHALEXIN 500 MG: 500 CAPSULE ORAL at 06:00

## 2024-01-01 RX ADMIN — SENNOSIDES AND DOCUSATE SODIUM 1 TABLET: 50; 8.6 TABLET ORAL at 21:47

## 2024-01-01 RX ADMIN — CEPHALEXIN 500 MG: 500 CAPSULE ORAL at 11:46

## 2024-01-01 RX ADMIN — POLYETHYLENE GLYCOL 3350 17 G: 17 POWDER, FOR SOLUTION ORAL at 21:46

## 2024-01-01 RX ADMIN — METFORMIN ER 500 MG 500 MG: 500 TABLET ORAL at 08:28

## 2024-01-01 ASSESSMENT — ACTIVITIES OF DAILY LIVING (ADL)
ADLS_ACUITY_SCORE: 25
ADLS_ACUITY_SCORE: 29
ADLS_ACUITY_SCORE: 24
ADLS_ACUITY_SCORE: 29
ADLS_ACUITY_SCORE: 24
ADLS_ACUITY_SCORE: 25
ADLS_ACUITY_SCORE: 24
ADLS_ACUITY_SCORE: 24
ADLS_ACUITY_SCORE: 29
ADLS_ACUITY_SCORE: 24
ADLS_ACUITY_SCORE: 24
ADLS_ACUITY_SCORE: 25

## 2024-01-01 NOTE — PROGRESS NOTES
Orthopedic Progress Note      Assessment: 5 Days Post-Op  S/P Procedure(s):  INTERNAL FIXATION, FRACTURE, TROCHANTERIC, RIGHT HIP, USING RODS     Plan:   Pain Control: Continue per multimodal pain protocol. Minimize opioid pain medications as able.  Weight Bearing: Weight bearing as tolerated  Precautions: Recommend walker at all times  DVT Prophylaxis: Risk stratified DVT prophylaxis. Aspirin 81 mg BID x 4 weeks postop. Early ambulation and SCDs while in bed.  Antibiotics: Given high risk and uncontrolled type 2 diabetes we will plan for 48 hours of IV cefazolin, followed by 2 weeks of oral antibiotic prophylaxis (Keflex while inpatient; Duricef as outpatient)  Diet: Advance diet as tolerated from orthopedic perspective  PT/OT: Continue to mobilize as able  Follow up:  2 weeks with Dr. Waldron for wound check and XR.  Consider referral to Dr. Harrison for bone health evaluation.  Discharge plan: Pending PT/OT evaluation and ongoing workup by medical team.  Anticipate discharge to TCU.  Case management is following.      Subjective:  Pain: mild  Nausea, Vomiting:  No  Lightheadedness, Dizziness:  No  Neuro:  Patient denies new onset numbness or paresthesias    Patient is comfortable. No new complaints. Pain well controlled.    Objective:  /87 (BP Location: Left arm, Patient Position: Supine, Cuff Size: Adult Regular)   Pulse 97   Temp 98.4  F (36.9  C) (Oral)   Resp 16   SpO2 93%   The patient is A&Ox3. Appears comfortable.   Sensation is intact.  Dorsiflexion and plantar flexion is intact.  Dorsalis pedis pulse intact.  Calves are soft and non-tender. Negative Shadi's.  The incisions are covered. Dressings C/D/I.    Pertinent Labs   Lab Results: personally reviewed.   Lab Results   Component Value Date    INR 1.06 12/27/2023    INR  12/27/2023      Comment:      Disregard results. Specimen clotted.  This is a corrected result. Previous result was 1.02 on 12/27/2023 at 12:31 PM CST     Lab Results   Component  Value Date    WBC 10.1 01/01/2024    HGB 9.4 (L) 01/01/2024    HCT 29.9 (L) 01/01/2024    MCV 90 01/01/2024     01/01/2024     Lab Results   Component Value Date     01/01/2024    CO2 32 (H) 01/01/2024         Report completed by:  LUIS F Burkett PA-C, CHARLA  Date: 1/1/2024  Time: 7:22 AM

## 2024-01-01 NOTE — PLAN OF CARE
Problem: Adult Inpatient Plan of Care  Goal: Plan of Care Review  Description: The Plan of Care Review/Shift note should be completed every shift.  The Outcome Evaluation is a brief statement about your assessment that the patient is improving, declining, or no change.  This information will be displayed automatically on your shift  note.  Outcome: Progressing     Problem: Risk for Delirium  Goal: Improved Sleep  Outcome: Progressing   Goal Outcome Evaluation:       Pt alert and oriented. Pain controlled with PRN tylenol. Intermittent numbness to RLE, states this is not new. Ax2 with walker. Dressing c/d/I with bruising to right hip.

## 2024-01-01 NOTE — PLAN OF CARE
Problem: Adult Inpatient Plan of Care  Goal: Plan of Care Review  Description: The Plan of Care Review/Shift note should be completed every shift.  The Outcome Evaluation is a brief statement about your assessment that the patient is improving, declining, or no change.  This information will be displayed automatically on your shift  note.  Outcome: Progressing     Problem: Hip Fracture Surgical Repair  Goal: Absence of Bleeding  Outcome: Progressing  Goal: Effective Bowel Elimination  Outcome: Progressing   Goal Outcome Evaluation:       Pt alert and oriented. Had large soft BM. Ax1 with walker. Ambulating in room. Pain controlled with PRN tylenol. CMS intact. Denies numbness/tingling today. No drainage on dressing. Bruising and mild edema to right hip. Ate 100% breakfast and lunch.

## 2024-01-01 NOTE — PLAN OF CARE
Problem: Adult Inpatient Plan of Care  Goal: Plan of Care Review  Description: The Plan of Care Review/Shift note should be completed every shift.  The Outcome Evaluation is a brief statement about your assessment that the patient is improving, declining, or no change.  This information will be displayed automatically on your shift  note.  Outcome: Progressing     Problem: Hip Fracture Surgical Repair  Goal: Optimal Coping with Change in Health Status  Outcome: Progressing  Goal: Optimal Functional Ability  Intervention: Promote Optimal Functional Status  Recent Flowsheet Documentation  Taken 1/1/2024 0000 by Linnea Watson RN  Activity Management: activity adjusted per tolerance  Goal: Anesthesia/Sedation Recovery  Intervention: Optimize Anesthesia Recovery  Recent Flowsheet Documentation  Taken 1/1/2024 0000 by Linnea Watson, RN  Safety Promotion/Fall Prevention:   activity supervised   assistive device/personal items within reach   nonskid shoes/slippers when out of bed   clutter free environment maintained  Administration (IS): instruction provided, initial  Level Incentive Spirometer (mL): 100  Incentive Spirometer Predicted Level (mL): 2500  Number of Repetitions (IS): 7  Patient Tolerance (IS): good  Goal: Optimal Pain Control and Function  Outcome: Progressing  Goal: Effective Oxygenation and Ventilation  Intervention: Optimize Oxygenation and Ventilation  Recent Flowsheet Documentation  Taken 1/1/2024 0000 by Linnea Watson, RN  Head of Bed (HOB) Positioning: HOB at 20 degrees     Problem: Hip Fracture Surgical Repair  Goal: Optimal Pain Control and Function  Outcome: Progressing     Problem: Comorbidity Management  Goal: Blood Glucose Levels Within Targeted Range  Outcome: Progressing   Goal Outcome Evaluation:       Pt is alert and oriented. Denies pain. VSS, /87.  Dressing to rt hip dry and intact. Urinating a lot. Sleeping between cares.

## 2024-01-01 NOTE — PROGRESS NOTES
[facility]    Medicine Progress Note - Hospitalist Service    Date of Admission:  12/27/2023    Assessment & Plan   Claudia Mckeon is a 79 year old female admitted on 12/27/2023. She has a history of uncontrolled T2DM, dementia, HTN, HLD and is admitted for right hip fracture after a fall. Underwent internal fixation of the right hip on 12/27/23. Poorly controlled T2DM, titrating diabetes regimen. Medically ready for discharge; awaiting TCU placement.      Fall  Acute right hip fracture  POD % R hip repair   Patient had a fall around 0100 on 12/27. Uses walker at baseline. She fell on her butt, did not hit her head, no LOC, no blood thinners (baby aspirin only). Immediately had right hip pain and was unable to stand up, had to crawl to ask for help. [facility] and found to have right hip fracture and sent to [facility] ED. On admission, imaging was obtained which showed an intertrochanteric fracture of the right femur. Operative repair on 12/27, now POD 5.  Awaiting TCU placement.  - Ortho consult, appreciate their recs              - S/p operative repair on 12/27              - Aspirin 81 mg BID x 4 weeks postop for VTE prophylaxis               - 48 hours of cefazolin and then keflex for 14 days total (transition to duricef at discharge, last dose 1/13)              - 2 week follow up with Dr. Waldron for wound check and xR              - Consider referral to Dr. Harrison for bone health evaluation.   - PT/OT/SW consults  - Imaging:              - CT H and cervical spine: No acute change. Degenerative changes present              - XR R Femur and pelvis: R Intertrochanteric fracture with moderate displacement              - XR R knee: No fracture. Degenerative changes. Osteopenia.   - Pain control:              - Heat, ice, tylenol              - IV dilaudid and PO oxycodone  - Regular diet     Type 2 DM  A1c over 15% on 12/19/23. Per pharmacy review, patient has not filled  metformin or SGLT2 prescriptions since Feb. Working on restarting these PO medications. Started insulin during hospitalization.Unclear if family will be able to manage prandial insulin at home following discharge from TCU. Ongoing adjustments to regimen.   - Lantus 13 units at bedtime   - Novolog 5 units with meals   - Novolog medium resistance SSI   - Restarted metformin 500 mg BID on 12/30 (work toward max dose as tolerated)   - Restart Jardiance 10 mg daily   - Check BG QID  - PTA gabapentin for neuropathy symptoms      Anemia  Patient with hgb of 9.3 on admission with recent value of 13.4 one week ago. MCV 87. No signs of active bleeding on exam other than into the ecchymosis of her right inner thigh. Hemoglobin stable today.   - Daily CBC  - Type and screen performed, consent obtained      Osteopenia  Osteopenia noted in right knee of imaging today. Do not see history of DEXA scan.   - Could consider DEXA scan in outpatient setting  - Consider referral to Dr. Harrison for bone health evaluation.      Constipation, improving  No bowel movement for 4-5 days on admission, passing gas, without abdominal pain. Has constipation at baseline but expect it to worsen in setting of decreased mobility and potential opioid use. Has now had BM x2 since 12/29.  - Scheduled senna BID and miralax BID in the setting of hip repair and likely opioid use     Chronic:  HTN: Patient had lisinopril on medlist previously but was not taking it on med rec.   HLD: PTA lipitor          Diet: Discharge Instruction - Regular Diet Adult  Moderate Consistent Carb (60 g CHO per Meal) Diet    DVT Prophylaxis:  Aspirin 81 mg BID x 4 weeks postop   Faith Catheter: Not present  Lines: None     Cardiac Monitoring: None  Code Status: Full Code      Clinically Significant Risk Factors                      # Dementia: noted on problem list   # DMII: A1C = >15.0 % (Ref range: 0.0 - 5.6 %) within past 6 months              Disposition Plan     Expected  Discharge Date: 01/02/2024    Discharge Delays: Placement - TCU              The patient's care was discussed with the Attending Physician, Dr. Poon .    Karyna Zhou MD  Hospitalist Service  [facility]  Securely message with [employer] (more info)  Text page via [facility] Paging/Directory   ______________________________________________________________________    Interval History   Daughter in room with patient. Patient eating large bowl of noodles, discussed importance of informing nurses so they can given insulin with meals. Daughter verbalizing understanding. Otherwise doing well, no concerns or questions.     Physical Exam   Vital Signs: Temp: 97.6  F (36.4  C) Temp src: Oral BP: 129/84 Pulse: 82   Resp: 16 SpO2: 90 % O2 Device: None (Room air)    Weight: 0 lbs 0 oz  GENERAL: Healthy, alert and no distress. Seated in chair eating meal. Daughter at beside.   RESP:  Lungs clear throughout. No wheeze or crackles.   CV: Heart RRR. No murmur  Abdomen: Soft, nontender, nondistended.  MSK: No gross deformity. Normal tone. Dressings in place over operative wound.   SKIN: Visible skin clear. No significant rash, abnormal pigmentation or lesions.  NEURO: Cranial nerves grossly intact.  Mentation and speech appropriate for age.  PSYCH: Mentation appears normal, affect normal, judgement and insight intact, normal speech and appearance well-groomed.    Data     I have personally reviewed the following data over the past 24 hrs:    10.1  \   9.4 (L)   / 402     139 101 13.4 /  119 (H)   4.4 32 (H) 0.81 \       Imaging results reviewed over the past 24 hrs:   No results found for this or any previous visit (from the past 24 hour(s)).

## 2024-01-02 ENCOUNTER — DOCUMENTATION ONLY (OUTPATIENT)
Dept: GERIATRICS | Facility: CLINIC | Age: 79
End: 2024-01-02
Payer: COMMERCIAL

## 2024-01-02 ENCOUNTER — LAB REQUISITION (OUTPATIENT)
Dept: LAB | Facility: CLINIC | Age: 79
End: 2024-01-02
Payer: COMMERCIAL

## 2024-01-02 VITALS
HEART RATE: 99 BPM | TEMPERATURE: 98.4 F | SYSTOLIC BLOOD PRESSURE: 109 MMHG | RESPIRATION RATE: 18 BRPM | OXYGEN SATURATION: 94 % | DIASTOLIC BLOOD PRESSURE: 58 MMHG

## 2024-01-02 DIAGNOSIS — E11.9 TYPE 2 DIABETES MELLITUS WITHOUT COMPLICATIONS (H): ICD-10-CM

## 2024-01-02 DIAGNOSIS — R53.81 OTHER MALAISE: ICD-10-CM

## 2024-01-02 LAB
ANION GAP SERPL CALCULATED.3IONS-SCNC: 9 MMOL/L (ref 7–15)
BUN SERPL-MCNC: 19.6 MG/DL (ref 8–23)
CALCIUM SERPL-MCNC: 9.4 MG/DL (ref 8.8–10.2)
CHLORIDE SERPL-SCNC: 100 MMOL/L (ref 98–107)
CREAT SERPL-MCNC: 0.76 MG/DL (ref 0.51–0.95)
DEPRECATED HCO3 PLAS-SCNC: 29 MMOL/L (ref 22–29)
EGFRCR SERPLBLD CKD-EPI 2021: 78 ML/MIN/1.73M2
ERYTHROCYTE [DISTWIDTH] IN BLOOD BY AUTOMATED COUNT: 13.5 % (ref 10–15)
GLUCOSE BLDC GLUCOMTR-MCNC: 139 MG/DL (ref 70–99)
GLUCOSE BLDC GLUCOMTR-MCNC: 141 MG/DL (ref 70–99)
GLUCOSE BLDC GLUCOMTR-MCNC: 152 MG/DL (ref 70–99)
GLUCOSE SERPL-MCNC: 148 MG/DL (ref 70–99)
HCT VFR BLD AUTO: 27.8 % (ref 35–47)
HGB BLD-MCNC: 8.7 G/DL (ref 11.7–15.7)
MCH RBC QN AUTO: 28.2 PG (ref 26.5–33)
MCHC RBC AUTO-ENTMCNC: 31.3 G/DL (ref 31.5–36.5)
MCV RBC AUTO: 90 FL (ref 78–100)
PLATELET # BLD AUTO: 427 10E3/UL (ref 150–450)
POTASSIUM SERPL-SCNC: 4.2 MMOL/L (ref 3.4–5.3)
RBC # BLD AUTO: 3.08 10E6/UL (ref 3.8–5.2)
SODIUM SERPL-SCNC: 138 MMOL/L (ref 135–145)
WBC # BLD AUTO: 9.6 10E3/UL (ref 4–11)

## 2024-01-02 PROCEDURE — 250N000013 HC RX MED GY IP 250 OP 250 PS 637

## 2024-01-02 PROCEDURE — 36415 COLL VENOUS BLD VENIPUNCTURE: CPT

## 2024-01-02 PROCEDURE — 250N000013 HC RX MED GY IP 250 OP 250 PS 637: Performed by: FAMILY MEDICINE

## 2024-01-02 PROCEDURE — 80048 BASIC METABOLIC PNL TOTAL CA: CPT

## 2024-01-02 PROCEDURE — 250N000013 HC RX MED GY IP 250 OP 250 PS 637: Performed by: STUDENT IN AN ORGANIZED HEALTH CARE EDUCATION/TRAINING PROGRAM

## 2024-01-02 PROCEDURE — 85027 COMPLETE CBC AUTOMATED: CPT

## 2024-01-02 PROCEDURE — 99238 HOSP IP/OBS DSCHRG MGMT 30/<: CPT | Mod: GC

## 2024-01-02 RX ORDER — METFORMIN HCL 500 MG
500 TABLET, EXTENDED RELEASE 24 HR ORAL 2 TIMES DAILY WITH MEALS
DISCHARGE
Start: 2024-01-02 | End: 2024-02-16

## 2024-01-02 RX ADMIN — ACETAMINOPHEN 650 MG: 325 TABLET, FILM COATED ORAL at 01:04

## 2024-01-02 RX ADMIN — Medication 81 MG: at 08:15

## 2024-01-02 RX ADMIN — INSULIN ASPART 1 UNITS: 100 INJECTION, SOLUTION INTRAVENOUS; SUBCUTANEOUS at 12:25

## 2024-01-02 RX ADMIN — ATORVASTATIN CALCIUM 20 MG: 10 TABLET, FILM COATED ORAL at 08:15

## 2024-01-02 RX ADMIN — ACETAMINOPHEN 650 MG: 325 TABLET, FILM COATED ORAL at 11:25

## 2024-01-02 RX ADMIN — POLYETHYLENE GLYCOL 3350 17 G: 17 POWDER, FOR SOLUTION ORAL at 08:18

## 2024-01-02 RX ADMIN — CEPHALEXIN 500 MG: 500 CAPSULE ORAL at 11:25

## 2024-01-02 RX ADMIN — METFORMIN ER 500 MG 500 MG: 500 TABLET ORAL at 08:15

## 2024-01-02 RX ADMIN — SENNOSIDES AND DOCUSATE SODIUM 1 TABLET: 50; 8.6 TABLET ORAL at 08:15

## 2024-01-02 RX ADMIN — CEPHALEXIN 500 MG: 500 CAPSULE ORAL at 00:58

## 2024-01-02 RX ADMIN — OXYCODONE HYDROCHLORIDE 2.5 MG: 5 TABLET ORAL at 08:23

## 2024-01-02 RX ADMIN — CEPHALEXIN 500 MG: 500 CAPSULE ORAL at 05:24

## 2024-01-02 RX ADMIN — EMPAGLIFLOZIN 10 MG: 10 TABLET, FILM COATED ORAL at 08:15

## 2024-01-02 ASSESSMENT — ACTIVITIES OF DAILY LIVING (ADL)
ADLS_ACUITY_SCORE: 29
ADLS_ACUITY_SCORE: 29
ADLS_ACUITY_SCORE: 33
ADLS_ACUITY_SCORE: 29
ADLS_ACUITY_SCORE: 33
ADLS_ACUITY_SCORE: 33
ADLS_ACUITY_SCORE: 29
ADLS_ACUITY_SCORE: 29

## 2024-01-02 NOTE — PLAN OF CARE
Goal Outcome Evaluation:    Problem: Adult Inpatient Plan of Care  Goal: Optimal Comfort and Wellbeing  Outcome: Progressing     Problem: Risk for Delirium  Goal: Improved Behavioral Control  Outcome: Progressing     Problem: Risk for Delirium  Goal: Improved Attention and Thought Clarity  Outcome: Progressing     Problem: Risk for Delirium  Goal: Improved Sleep  Outcome: Progressing     Problem: Hip Fracture Surgical Repair  Goal: Optimal Pain Control and Function  Outcome: Progressing     Problem: Hip Fracture Surgical Repair  Goal: Effective Urinary Elimination  Outcome: Progressing        Pt alert and oriented. RLE dressings CDI, some bruising and edema around the area. Pt reports some intermittent tingling that isnt new. Pain controlled with ice packs and prn tylenol. Ate 100% of dinner. BG of 219 before dinner and 306 before bed.

## 2024-01-02 NOTE — DISCHARGE SUMMARY
[facility]  Discharge Summary - Medicine & Pediatrics       Date of Admission:  12/27/2023  Date of Discharge:  1/2/2024  Discharging Provider: Dr Diana/Dr Paul  Discharge Service: Hospitalist Service    Discharge Diagnoses   Acute right hip fracture  Type 2 diabetes mellitus  Osteopenia  Normocytic anemia  Hypertension  Hyperlipidemia    Clinically Significant Risk Factors     # DMII: A1C = >15.0 % (Ref range: 0.0 - 5.6 %) within past 6 months       Follow-ups Needed After Discharge   Follow-up Appointments     Follow Up Care      Follow-up with your Surgeon Team in 2 weeks for wound check. If you have   any questions for the team before, contact Team Vanita using .          Follow Up and recommended labs and tests      Follow up with senior living physician.  The following labs/tests are   recommended: BMP, CBC.            Unresulted Labs Ordered in the Past 30 Days of this Admission       No orders found from 11/27/2023 to 12/28/2023.            Discharge Disposition   Discharged to short-term care facility  Condition at discharge: Stable    Hospital Course   Claudia Mckeon is a 79 year old female admitted on 12/27/2023. She has a history of uncontrolled T2DM, dementia, HTN, HLD and is admitted for right hip fracture after a fall. Underwent internal fixation of the right hip on 12/27/23.      Fall  Acute right hip fracture  Patient had a fall around 0100 on 12/27.  Immediately had right hip pain and was unable to stand up, had to crawl to ask for help. [facility] and found to have right hip fracture and sent to [facility] ED. On admission, imaging demonstrated intertrochanteric fracture of the right femur. Operative repair on 12/27.  Treated with Ancef while hospitalized, transition to Duricef for total of 14 days at discharge for wound prophylaxis.  Postoperative course was uncomplicated.  Discharged to transitional care unit.              - Aspirin 81 mg BID x 4  weeks postop for VTE prophylaxis               - 2 week follow up with Dr. Waldron for wound check and xR    - Oxycodone and tylenol for pain       Type 2 DM  A1c over 15% on 12/19/23.previously on metformin and SGLT2 inhibitor.  Started on insulin during hospital stay, fairly well-controlled on current regimen.  Recommended continuing at discharge.  Can follow-up with her primary care provider for ongoing titration.  - Lantus 13 units at bedtime   - NovoLog 5 units with meals  -Metformin 500 mg BID   -  Jardiance 10 mg daily        Normocytic anemia  Patient with hgb of 9.3 on admission with recent value of 13.4 one week prior to admission.  Stabilized around 9.0.  There was no signs of bleeding.  Recommend repeating as an outpatient.     Osteopenia  Osteopenia noted in right knee of imaging.  No DEXA in the chart.  Recommend following up as an outpatient for ongoing management.       Constipation  Continued bowel regimen at discharge     Chronic:  Hypertension -previous prescribed lisinopril, had not been taking.  Blood pressure well-controlled.  HLD: Continued PTA lipitor    Consultations This Hospital Stay   ORTHOPEDIC SURGERY IP CONSULT  PHYSICAL THERAPY ADULT IP CONSULT  OCCUPATIONAL THERAPY ADULT IP CONSULT  CARE MANAGEMENT / SOCIAL WORK IP CONSULT  PHYSICAL THERAPY ADULT IP CONSULT  OCCUPATIONAL THERAPY ADULT IP CONSULT  PHYSICAL THERAPY ADULT IP CONSULT  OCCUPATIONAL THERAPY ADULT IP CONSULT    Code Status   Full Code       The patient was discussed with Dr. Humberto Diana MD  [facility] [address] [place] [address]  Phone: [phone]  Fax: [phone]  ______________________________________________________________________    Physical Exam   Vital Signs: Temp: 98.4  F (36.9  C) Temp src: Oral BP: 109/58 Pulse: 99   Resp: 18 SpO2: 94 % O2 Device: None (Room air)    Weight: 0 lbs 0 oz  General Appearance: Comfortable.   "No acute distress.  Respiratory: Comfortable respiratory effort.  Clear to auscultation bilaterally.  No crackles or wheezing.  Cardiovascular: Regular rate and rhythm.  No murmurs rubs or gallops.  GI: Nondistended.  Bowel sounds present.  Soft nontender  Neuro: Neutral affect.  Fluent speech.          Primary Care Physician   Stacey Morgan    Discharge Orders      Reason for your hospital stay    Right hip fracture surgical fixation.     When to call - Contact Surgeon Team    You may experience symptoms that require follow-up before your scheduled appointment. Refer to the \"Stoplight Tool\" for instructions on when to contact your Surgeon Team if you are concerned about pain control, blood clots, constipation, or if you are unable to urinate.     When to call - Reach out to Urgent Care    If you are not able to reach your Surgeon Team and you need immediate care, go to the Orthopedic Walk-in Clinic or Urgent Care at your Surgeon's office.  Do NOT go to the Emergency Room unless you have shortness of breath, chest pain, or other signs of a medical emergency.     When to call - Reasons to Call 911    Call 911 immediately if you experience sudden-onset chest pain, arm weakness/numbness, slurred speech, or shortness of breath     Discharge Instruction - Breathing exercises    Perform breathing exercises using your Incentive Spirometer 10 times per hour while awake for 2 weeks.     Symptoms - Fever Management    A low grade fever can be expected after surgery.  Use acetaminophen (TYLENOL) as needed for fever management.  Contact your Surgeon Team if you have a fever greater than 101.5 F, chills, and/or night sweats.     Symptoms - Constipation management    Constipation (hard, dry bowel movements) is expected after surgery due to the combination of being less active, the anesthetic, and the opioid pain medication.  You can do the following to help reduce constipation:  ~  FLUIDS:  Drink clear liquids (water or Gatorade), " or juice (apple/prune).  ~  DIET:  Eat a fiber rich diet.    ~  ACTIVITY:  Get up and move around several times a day.  Increase your activity as you are able.  MEDICATIONS:  Reduce the risk of constipation by starting medications before you are constipated.  You can take Miralax   (1 packet as directed) and/or a stool softener (Senokot 1-2 tablets 1-2 times a day).  If you already have constipation and these medications are not working, you can get magnesium citrate and use as directed.  If you continue to have constipation you can try an over the counter suppository or enema.  Call your Surgeon Team if it has been greater than 3 days since your last bowel movement.     Symptoms - Reduced Urine Output    Changes in the amount of fluids you drank before and after surgery may result in problems urinating.  It is important to stay well-hydrated after surgery and drink plenty of water. If it has been greater than 8 hours since you have urinated despite drinking plenty of water, call your Surgeon Team.     Medication instructions -  Anticoagulation - aspirin    Take the aspirin as prescribed for a total of four weeks after surgery.  This is given to help minimize your risk of blood clot.     Activity - Exercises to prevent blood clots    Unless otherwise directed by your Surgeon team, perform the following exercises at least three times per day for the first four weeks after surgery to prevent blood clots in your legs: 1) Point and flex your feet (Ankle Pumps), 2) Move your ankle around in big circles, 3) Wiggle your toes, 4) Walk, even for short distances, several times a day, will help decrease the risk of blood clots.     Comfort and Pain Management - Pain after Surgery    Pain after surgery is normal and expected.  You will have some amount of pain for several weeks after surgery.  Your pain will improve with time.  There are several things you can do to help reduce your pain including: rest, ice, elevation, and  "using pain medications as needed. Contact your Surgeon Team if you have pain that persists or worsens after surgery despite rest, ice, elevation, and taking your medication(s) as prescribed. Contact your Surgeon Team if you have new numbness, tingling, or weakness in your operative extremity.     Comfort and Pain Management - Swelling after Surgery    Swelling and/or bruising of the surgical extremity is common and may persist for several months after surgery. In addition to frequent icing and elevation, gentle compressive support with an ACE wrap or tubigrip may help with swelling. Apply compression regularly, removing at least twice daily to perform skin checks. Contact your Surgeon Team if your swelling increases and is NOT associated with an increase in your activity level, or if your swelling increases and is associated with redness and pain.     Comfort and Pain Management - LOWER Extremity Elevation    Swelling is expected for several months after surgery. This type of swelling is usually associated with gravity and activity, and can be improved with elevation.   The best way to do this is to get your \"toes above your nose\" by laying down and placing several pillows lengthwise under your calf and heel. When elevating your leg keep your knee completely straight. Perform this elevation as often as possible especially for the first two weeks after surgery.     Comfort and Pain Management - Cold therapy    Ice can be used to control swelling and discomfort after surgery. Place a thin towel over your operative site and apply the ice pack overtop. Leave ice pack in place for 20 minutes, then remove for 20 minutes. Repeat this 20 minutes on/20 minutes off routine as often as tolerated.     Medication Instructions - Acetaminophen (TYLENOL) Instructions    You were discharged with acetaminophen (TYLENOL) for pain management after surgery. Acetaminophen most effectively manages pain symptoms when it is taken on a " "schedule without missing doses (every four, six, or eight hours). Your Provider will prescribe a safe daily dose between 3000 - 4000 mg.  Do NOT exceed this daily dose. Most patients use acetaminophen for pain control for the first four weeks after surgery.  You can wean from this medication as your pain decreases.     Medication Instructions - Opioids - Tapering Instructions    In the first three days following surgery, your symptoms may warrant use of the narcotic pain medication every four to six hours as prescribed. This is normal. As your pain symptoms improve, focus your efforts on decreasing (tapering) use of narcotic medications. The most successful tapering strategy is to first, decrease the number of tablets you take every 4-6 hours to the minimum prescribed. Then, increase the amount of time between doses.  For example:  First, taper to   or 1 tablet every 4-6 hours.  Then, taper to   or 1 tablet every 6-8 hours.  Then, taper to   or 1 tablet every 8-10 hours.  Then, taper to   or 1 tablet every 10-12 hours.  Then, taper to   or 1 tablet at bedtime.  The bedtime dose can help with comfort during sleep and is typically the last dose to be discontinued after surgery.     Follow Up Care    Follow-up with your Surgeon Team in 2 weeks for wound check. If you have any questions for the team before, contact Team Vanita using .     Activity - Total Hip Arthroplasty    Refer to the [facility] \"Your Guide to Total Joint Replacement\" for recommendations on activities and Exercises.     Return to Driving    Return to driving - Driving is NOT permitted until directed by your provider. Under no circumstance are you permitted to drive while using narcotic pain medications.     No precautions    No precautions directed by your Provider.  You may perform range of motion activities as tolerated.     Weight bearing as tolerated    Weight bearing as tolerated on your operative extremity.     Dressing / " Wound Care - Wound    You have a clean dressing on your surgical wound. Dressing change instructions as follows: dressing will be removed at your follow-up appointment. Contact your Surgeon Team if you have increased redness, warmth around the surgical wound, and/or drainage from the surgical wound.     Dressing / Wound care - Shower with wound/dressing covered    You must COVER your dressing or incision with saran wrap (or any other non-permeable covering) to allow the incision to remain dry while showering.  You may shower 0 days after surgery as long as the surgical wound stays dry. Continue to cover your dressing or incision for showering until your first office visit.  You are strictly prohibited from soaking   or submerging the surgical wound underwater.     Dressing / Wound Care - NO Tub Bathing    Tub bathing, swimming, or any other activities that will cause your incision to be submerged in water should be avoided until allowed by your Surgeon.     Medication Instructions - Opioid Instructions (Greater than or equal to 65 years)    You were discharged with an opioid medication (hydromorphone, oxycodone, hydrocodone, or tramadol). This medication should only be taken for breakthrough pain that is not controlled with acetaminophen (TYLENOL). If you rate your pain less than 3 you do not need this medication.  Pain rating 0-3:  You do not need this medication  Pain rating 4-6:  Take 1/2 tablet every 4-6 hours as needed  Pain rating 7-10:  Take 1 tablet every 4-6 hours as needed  Do not exceed 4 tablets per day     General info for SNF    Length of Stay Estimate: Short Term Care: Estimated # of Days <30  Condition at Discharge: Stable  Level of care:skilled   Rehabilitation Potential: Fair  Admission H&P remains valid and up-to-date: Yes  Recent Chemotherapy: N/A  Use Nursing Home Standing Orders: Yes     Mantoux instructions    Give two-step Mantoux (PPD) Per Facility Policy Yes     Follow Up and recommended  labs and tests    Follow up with retirement physician.  The following labs/tests are recommended: BMP, CBC.     Reason for your hospital stay    You were admitted to the hospital after a hip fracture.  You underwent surgery.  You were found to have uncontrolled diabetes, and you are started on new medications to help control your blood sugars.     Glucose monitor nursing POCT    Before meals and at bedtime     Physical Therapy Adult Consult    Evaluate and treat as clinically indicated.    Reason:  Hip fracture s/p repair     Occupational Therapy Adult Consult    Evaluate and treat as clinically indicated.    Reason:  Hip fracture s/p repair     Discharge Instruction - Regular Diet Adult    Return to your pre-surgery diet unless instructed otherwise     Diet    Follow this diet upon discharge: Orders Placed This Encounter      Discharge Instruction - Regular Diet Adult      Moderate Consistent Carb (60 g CHO per Meal) Diet       Significant Results and Procedures   Most Recent 3 CBC's:  Recent Labs   Lab Test 01/02/24  0543 01/01/24  0521 12/31/23  0522   WBC 9.6 10.1 10.5   HGB 8.7* 9.4* 8.7*   MCV 90 90 91    402 338     Most Recent 3 BMP's:  Recent Labs   Lab Test 01/02/24  1216 01/02/24  0802 01/02/24  0543 01/01/24  0846 01/01/24  0520 12/31/23  0809 12/31/23  0522   NA  --   --  138  --  139  --  136   POTASSIUM  --   --  4.2  --  4.4  --  4.2   CHLORIDE  --   --  100  --  101  --  98   CO2  --   --  29  --  32*  --  31*   BUN  --   --  19.6  --  13.4  --  13.2   CR  --   --  0.76  --  0.81  --  0.72   ANIONGAP  --   --  9  --  6*  --  7   OMARI  --   --  9.4  --  9.4  --  9.1   * 139* 148*   < > 141*   < > 262*    < > = values in this interval not displayed.   ,   Results for orders placed or performed during the hospital encounter of 12/27/23   XR Chest Port 1 View    Narrative    EXAM: XR CHEST PORT 1 VIEW  LOCATION: [facility]  DATE: 12/27/2023    INDICATION:  Preoperative exam.  COMPARISON: None.      Impression    IMPRESSION: Negative chest.   XR Femur Right 2 Views    Narrative    EXAM: XR FEMUR RIGHT 2 VIEWS  LOCATION: [facility]  DATE: 12/27/2023    INDICATION: Right hip pain.  COMPARISON: None.      Impression    IMPRESSION: Acute moderately displaced intertrochanteric fracture of the right femur with mild resulting varus angulation. Advanced degenerative changes in the medial compartment of the right knee.   CT Head w/o Contrast    Narrative    EXAM: CT HEAD W/O CONTRAST  LOCATION: [facility]  DATE: 12/27/2023    INDICATION: fall age 78 distracting injury dementia, trauma, injury.  COMPARISON: Brain MRI 01/17/2014  TECHNIQUE: Routine CT Head without IV contrast. Multiplanar reformats. Dose reduction techniques were used.    FINDINGS:  INTRACRANIAL CONTENTS: No intracranial hemorrhage. Mild diffuse cerebral parenchymal volume loss. No midline shift. The basilar cisterns are patent. No cerebellar tonsillar ectopia. Mild periventricular and scattered foci of deep white matter   hypodensities involving both cerebral hemispheres. No CT evidence for an acute infarct.    VISUALIZED ORBITS/SINUSES/MASTOIDS: No intraorbital abnormality. Mild mucosal thickening of ethmoid air cells. Small chronic mucosal thickening of the left sphenoid sinus. No middle ear or mastoid effusion.    BONES/SOFT TISSUES: No acute abnormality.      Impression    IMPRESSION:  1.  No intracranial hemorrhage, mass lesions, hydrocephalus or CT evidence for an acute infarct.  2.  Mild diffuse cerebral parenchymal volume loss. Chronic hypertensive/microvascular ischemic white matter changes.   CT Cervical Spine w/o Contrast    Narrative    EXAM: CT CERVICAL SPINE W/O CONTRAST  LOCATION: [facility]  DATE: 12/27/2023    INDICATION: fall distracting injury dementia, trauma, injury.  COMPARISON: None.  TECHNIQUE: Routine CT Cervical  "Spine without IV contrast. Multiplanar reformats. Dose reduction techniques were used.    FINDINGS:  VERTEBRA: Mild reversal of the normal cervical lordosis. Normal sagittal alignment. Craniocervical junction is within normal limits. Vertebral body heights are maintained. Small well-corticated osseous defect involving the anterior endplates from C4 to   C7. No fractures.    CANAL/FORAMINA: Intervertebral disc spaces are within normal limits. Mild spinal canal narrowing at C2-C3. Mild to moderate spinal canal narrowing at C3-C4. Moderate severe right neuroforaminal narrowing at C3-C4. Mild right neuroforaminal narrowing at   C5-C6.    PARASPINAL: No extraspinal abnormality.      Impression    IMPRESSION:  1.  Mild reversal of the normal cervical lordosis, nonspecific. It can be seen in muscle spasm.  2.  No fracture or posttraumatic subluxation.     XR Pelvis 1/2 Views    Narrative    EXAM: XR PELVIS 1/2 VIEWS  LOCATION: [facility]  DATE: 12/27/2023    INDICATION: Right hip pain.  COMPARISON: None.      Impression    IMPRESSION: Acute moderately displaced intertrochanteric fracture of the right femur with superior displacement of the femoral shaft. Osteopenia.   XR Knee Right 1/2 Views    Narrative    EXAM: XR KNEE RIGHT 1/2 VIEWS  LOCATION: [facility]  DATE: 12/27/2023    INDICATION: Right knee pain.  COMPARISON: None.      Impression    IMPRESSION: No fracture. Advanced degenerative changes in the medial compartment and mild degenerative changes in the lateral compartment. Osteopenia. Small effusion.   POC US Guidance Needle Placement    Narrative    Ultrasound was performed as guidance to an anesthesia procedure.  Click   \"PACS images\" hyperlink below to view any stored images.  For specific   procedure details, view procedure note authored by anesthesia.   XR Surgery MERCEDES Fluoro L/T 5 Min    Narrative    This exam was marked as non-reportable because it will not " "be read by a   radiologist or a [place] non-radiologist provider.         POC US Guidance Needle Placement    Narrative    Ultrasound was performed as guidance to an anesthesia procedure.  Click   \"PACS images\" hyperlink below to view any stored images.  For specific   procedure details, view procedure note authored by anesthesia.   XR Femur Port Right 2 Views    Narrative    EXAM: XR PELVIS PORT 1/2 VIEWS, XR FEMUR PORT RIGHT 2 VIEWS  LOCATION: [facility]  DATE: 12/27/2023    INDICATION: Status post hip surgery.  COMPARISON: None.      Impression    IMPRESSION: Postoperative changes of IM sheryl and screw fixation traversing a comminuted intertrochanteric fracture of the right hip. This is in good anatomic alignment with mild distraction of a fracture fragment involving the lesser trochanter. Post   procedural air surrounding the right hip. No dislocation. Left hip negative for fracture. Pelvis negative for fracture. Right femur otherwise negative for fracture. Additional degenerative change at the right knee joint.   XR Pelvis Port 1/2 Views    Narrative    EXAM: XR PELVIS PORT 1/2 VIEWS, XR FEMUR PORT RIGHT 2 VIEWS  LOCATION: [facility]  DATE: 12/27/2023    INDICATION: Status post hip surgery.  COMPARISON: None.      Impression    IMPRESSION: Postoperative changes of IM sheryl and screw fixation traversing a comminuted intertrochanteric fracture of the right hip. This is in good anatomic alignment with mild distraction of a fracture fragment involving the lesser trochanter. Post   procedural air surrounding the right hip. No dislocation. Left hip negative for fracture. Pelvis negative for fracture. Right femur otherwise negative for fracture. Additional degenerative change at the right knee joint.       Discharge Medications   Current Discharge Medication List        START taking these medications    Details   acetaminophen (TYLENOL) 325 MG tablet Take 2 tablets (650 " mg) by mouth every 4 hours as needed for other (mild pain)  Qty: 100 tablet, Refills: 0    Associated Diagnoses: Hip fracture, right, closed, initial encounter (H)      aspirin 81 MG EC tablet Take 1 tablet (81 mg) by mouth 2 times daily  Qty: 60 tablet, Refills: 0    Associated Diagnoses: Hip fracture, right, closed, initial encounter (H)      cefadroxil (DURICEF) 500 MG capsule Take 1 capsule (500 mg) by mouth 2 times daily for 14 days Stop taking 2 weeks after surgery.  Qty: 28 capsule, Refills: 0    Associated Diagnoses: Hip fracture, right, closed, initial encounter (H)      empagliflozin (JARDIANCE) 10 MG TABS tablet Take 1 tablet (10 mg) by mouth daily    Associated Diagnoses: Type 2 diabetes mellitus with hyperglycemia, unspecified whether long term insulin use (H)      !! insulin aspart (NOVOLOG PEN) 100 UNIT/ML pen Inject 5 Units Subcutaneous daily (with dinner)    Associated Diagnoses: Type 2 diabetes mellitus with hyperglycemia, unspecified whether long term insulin use (H)      !! insulin aspart (NOVOLOG PEN) 100 UNIT/ML pen Inject 5 Units Subcutaneous daily (with lunch)    Associated Diagnoses: Type 2 diabetes mellitus with hyperglycemia, unspecified whether long term insulin use (H)      !! insulin aspart (NOVOLOG PEN) 100 UNIT/ML pen Inject 5 Units Subcutaneous daily (with breakfast)    Associated Diagnoses: Type 2 diabetes mellitus with hyperglycemia, unspecified whether long term insulin use (H)      insulin glargine (LANTUS PEN) 100 UNIT/ML pen Inject 13 Units Subcutaneous at bedtime    Comments: If Lantus is not covered by insurance, may substitute Basaglar or Semglee or other insulin glargine product per insurance preference at same dose and frequency.    Associated Diagnoses: Type 2 diabetes mellitus with hyperglycemia, unspecified whether long term insulin use (H)      metFORMIN (GLUCOPHAGE XR) 500 MG 24 hr tablet Take 1 tablet (500 mg) by mouth 2 times daily (with meals)    Associated  Diagnoses: Type 2 diabetes mellitus with hyperglycemia, unspecified whether long term insulin use (H)      oxyCODONE (ROXICODONE) 5 MG tablet Take 0.5 tablets (2.5 mg) by mouth every 4 hours as needed for moderate pain  Qty: 10 tablet, Refills: 0    Associated Diagnoses: Hip fracture, right, closed, initial encounter (H)      polyethylene glycol (MIRALAX) 17 g packet Take 17 g by mouth daily  Qty: 7 packet, Refills: 0    Associated Diagnoses: Hip fracture, right, closed, initial encounter (H)      senna-docusate (SENOKOT-S/PERICOLACE) 8.6-50 MG tablet Take 1-2 tablets by mouth 2 times daily Take while on oral narcotics to prevent or treat constipation.  Qty: 30 tablet, Refills: 0    Comments: While taking narcotics  Associated Diagnoses: Hip fracture, right, closed, initial encounter (H)       !! - Potential duplicate medications found. Please discuss with provider.        CONTINUE these medications which have NOT CHANGED    Details   atorvastatin (LIPITOR) 20 MG tablet Take 20 mg by mouth daily      blood glucose (NO BRAND SPECIFIED) lancets standard Use to test blood sugar 2 times daily or as directed.  Qty: 100 each, Refills: 11    Associated Diagnoses: Type II diabetes mellitus with peripheral circulatory disorder (H)      !! blood glucose (NO BRAND SPECIFIED) test strip Use to test blood sugar 2 times daily or as directed.  Qty: 100 strip, Refills: 11    Associated Diagnoses: Type II diabetes mellitus with peripheral circulatory disorder (H)      !! blood sugar diagnostic (ONE TOUCH ULTRA TEST) Strp [BLOOD SUGAR DIAGNOSTIC (ONE TOUCH ULTRA TEST) STRP] Use As Directed daily. Test one time.   Blue In Vitro       !! - Potential duplicate medications found. Please discuss with provider.        Allergies   Allergies   Allergen Reactions    Ibuprofen Rash

## 2024-01-02 NOTE — PLAN OF CARE
Occupational Therapy Discharge Summary    Reason for therapy discharge:    Discharged to transitional care facility.    Progress towards therapy goal(s). See goals on Care Plan in [facility] electronic health record for goal details.  Goals not met.  Barriers to achieving goals:   discharge from facility.    Therapy recommendation(s):    Continued therapy is recommended.  Rationale/Recommendations:  Pt is going to TCU for further therapy.

## 2024-01-02 NOTE — PROGRESS NOTES
"Care Management Follow Up    Length of Stay (days): 6    Expected Discharge Date: 01/02/2024     Concerns to be Addressed: discharge planning        Patient plan of care discussed at interdisciplinary rounds: Yes    Anticipated Discharge Disposition:      Education Provided on the Discharge Plan:  Per Care Team   Patient/Family in Agreement with the Plan:  Yes    Referrals Placed by CM/SW:    Private pay costs discussed: Not applicable    Additional Information:  Chart reviewed.    Cm updates:  RNCM was told by resident that pt is medically ready for discharge.    TCU referrals pending.     RNCM called and spoke to [facility] TCU admissions they are currently reviewing pt.       VM left for [facility] TCU, awaiting call back.      [facility] has no beds available until 1/5.       Social Hx:  \"Patient is a Leanne speaker. Using [employer]  and grandson, Rashi,[phone]. Patient is alert,oriented and mostly independent at baseline. She lives with grandson, Rashi and extended family. She is interested in TCU and is on lists. PAS needed. Transport TBD.\"      Cm will continue to follow plan of care,review recommendations, and assist with any discharge needs anticipated.       Elif Hernandez RN    Care Management Discharge Note    Discharge Date: 01/02/2024       Discharge Disposition: Transitional Care    Discharge Transportation: health plan transportation      PAS Confirmation Code: [id number]  Patient/family educated on Medicare website which has current facility and service quality ratings:  Yes    Education Provided on the Discharge Plan:  Per Care Team   Persons Notified of Discharge Plans: Yes  Patient/Family in Agreement with the Plan:      Handoff Referral Completed: Yes    Additional Information:    Final discharge plan is for pt to go to [facility] today 01/02. Family/pt agreeble to the private cost of w/c transport.  [facility] w/c transport set up for 3:00pm-4:00pm today 1/2. Sandy " Rashi,[phone] notified over the phone, and address/phone # of facility provided.Family plans to visit pt this evening at [facility].Bedside, facility, grandson, provider, and pt notified.       PAS completed.

## 2024-01-02 NOTE — PROGRESS NOTES
Orthopedic Progress Note      Assessment: 6 Days Post-Op  S/P Procedure(s):  INTERNAL FIXATION, FRACTURE, TROCHANTERIC, RIGHT HIP, USING RODS     Plan:   Pain Control: Continue per multimodal pain protocol. Minimize opioid pain medications as able.  Weight Bearing: Weight bearing as tolerated  Precautions: Recommend walker at all times  DVT Prophylaxis: Risk stratified DVT prophylaxis. Aspirin 81 mg BID x 4 weeks postop. Early ambulation and SCDs while in bed.  Antibiotics: Given high risk and uncontrolled type 2 diabetes 2 weeks of oral antibiotic prophylaxis (Keflex while inpatient; Duricef as outpatient)  Diet: Advance diet as tolerated from orthopedic perspective  PT/OT: Continue to mobilize as able  Follow up:  2 weeks with Dr. Waldron for wound check and XR.  Consider referral to Dr. Harrison for bone health evaluation.  Discharge plan: Pending PT/OT evaluation and ongoing workup by medical team.  Anticipate discharge to TCU.  Case management is following.      Subjective:  Pain: mild  Nausea, Vomiting:  No  Lightheadedness, Dizziness:  No  Neuro:  Patient denies new onset numbness or paresthesias    Patient is comfortable. No new complaints. Pain well controlled.    Objective:  /58 (BP Location: Left arm)   Pulse 99   Temp 98.4  F (36.9  C) (Oral)   Resp 18   SpO2 94%   The patient is asleep today. Appears comfortable.   Sensation is intact.  Dorsiflexion and plantar flexion is intact.  Dorsalis pedis pulse intact.  Calves are soft and non-tender. Negative Shadi's.  The incisions are covered. Dressings C/D/I.    Pertinent Labs   Lab Results: personally reviewed.   Lab Results   Component Value Date    INR 1.06 12/27/2023    INR  12/27/2023      Comment:      Disregard results. Specimen clotted.  This is a corrected result. Previous result was 1.02 on 12/27/2023 at 12:31 PM CST     Lab Results   Component Value Date    WBC 9.6 01/02/2024    HGB 8.7 (L) 01/02/2024    HCT 27.8 (L) 01/02/2024    MCV 90  01/02/2024     01/02/2024     Lab Results   Component Value Date     01/02/2024    CO2 29 01/02/2024         KRAIG DOMINGUEZ PA-C  01/02/2024

## 2024-01-02 NOTE — PROGRESS NOTES
Pt discharging to TCU via [facility] transport, family aware. Pt stable. All belongings returned. IV out.

## 2024-01-02 NOTE — PLAN OF CARE
Problem: Adult Inpatient Plan of Care  Goal: Plan of Care Review  Description: The Plan of Care Review/Shift note should be completed every shift.  The Outcome Evaluation is a brief statement about your assessment that the patient is improving, declining, or no change.  This information will be displayed automatically on your shift  note.  Outcome: Progressing     Problem: Adult Inpatient Plan of Care  Goal: Optimal Comfort and Wellbeing  Outcome: Progressing     Problem: Risk for Delirium  Goal: Improved Sleep  Outcome: Progressing     Problem: Hip Fracture Surgical Repair  Goal: Optimal Coping with Change in Health Status  Outcome: Progressing   Goal Outcome Evaluation:  Pt is alert and oriented x4. /75, afebrile. C/o headace at 1AM, given PRN Tylenol 650 mg, effective. Pt slept between cares. BG at 02AM is 152, pt is drinking much water, using brief this time , pt refused urinal or purewick. Changed brief x2 soaked with urine.

## 2024-01-02 NOTE — PROGRESS NOTES
Physical Therapy Discharge Summary    Reason for therapy discharge:    Discharged to TCU.    Progress towards therapy goal(s). See goals on Care Plan in [facility] electronic health record for goal details.  Goals partially met.  Barriers to achieving goals:   discharge from facility.    Therapy recommendation(s):    Further recommended therapy is related to documented deficits, and is necessary to maximize functional independence in order for patient to return to prior level of function.      Belgica Meyers, BLAINE 1/2/2024

## 2024-01-02 NOTE — PLAN OF CARE
Problem: Adult Inpatient Plan of Care  Goal: Plan of Care Review  Description: The Plan of Care Review/Shift note should be completed every shift.  The Outcome Evaluation is a brief statement about your assessment that the patient is improving, declining, or no change.  This information will be displayed automatically on your shift  note.  Outcome: Progressing     Problem: Hip Fracture Surgical Repair  Goal: Absence of Bleeding  Outcome: Progressing  Goal: Optimal Pain Control and Function  Outcome: Progressing  Intervention: Prevent or Manage Pain  Recent Flowsheet Documentation  Taken 1/2/2024 0823 by Ernestine Suarez, RN  Pain Management Interventions:   medication (see MAR)   rest   repositioned   relaxation techniques promoted   quiet environment facilitated   prayer   food   distraction   Goal Outcome Evaluation:       Pt alert and oriented. Ate breakfast and dinner. Up with Ax1 and walker. Oxy 2.5mg given for pain this morning, effective. Otherwise controlled with ice and PRN tylenol.

## 2024-01-03 ENCOUNTER — PATIENT OUTREACH (OUTPATIENT)
Dept: CARE COORDINATION | Facility: CLINIC | Age: 79
End: 2024-01-03

## 2024-01-03 ENCOUNTER — TRANSITIONAL CARE UNIT VISIT (OUTPATIENT)
Dept: GERIATRICS | Facility: CLINIC | Age: 79
End: 2024-01-03
Payer: COMMERCIAL

## 2024-01-03 VITALS
RESPIRATION RATE: 18 BRPM | DIASTOLIC BLOOD PRESSURE: 82 MMHG | HEIGHT: 59 IN | OXYGEN SATURATION: 96 % | TEMPERATURE: 96.3 F | HEART RATE: 108 BPM | SYSTOLIC BLOOD PRESSURE: 115 MMHG | WEIGHT: 113.12 LBS | BODY MASS INDEX: 22.8 KG/M2

## 2024-01-03 DIAGNOSIS — S72.001S CLOSED RIGHT HIP FRACTURE, SEQUELA: Primary | ICD-10-CM

## 2024-01-03 DIAGNOSIS — E11.9 DM2 (DIABETES MELLITUS, TYPE 2) (H): ICD-10-CM

## 2024-01-03 DIAGNOSIS — M85.80 OSTEOPENIA: ICD-10-CM

## 2024-01-03 DIAGNOSIS — D64.9 NORMOCYTIC ANEMIA: ICD-10-CM

## 2024-01-03 LAB
ANION GAP SERPL CALCULATED.3IONS-SCNC: 13 MMOL/L (ref 7–15)
BUN SERPL-MCNC: 19.7 MG/DL (ref 8–23)
CALCIUM SERPL-MCNC: 9.4 MG/DL (ref 8.8–10.2)
CHLORIDE SERPL-SCNC: 100 MMOL/L (ref 98–107)
CREAT SERPL-MCNC: 0.84 MG/DL (ref 0.51–0.95)
DEPRECATED HCO3 PLAS-SCNC: 25 MMOL/L (ref 22–29)
EGFRCR SERPLBLD CKD-EPI 2021: 69 ML/MIN/1.73M2
ERYTHROCYTE [DISTWIDTH] IN BLOOD BY AUTOMATED COUNT: 14.3 % (ref 10–15)
GLUCOSE SERPL-MCNC: 176 MG/DL (ref 70–99)
HCT VFR BLD AUTO: 28.7 % (ref 35–47)
HGB BLD-MCNC: 8.8 G/DL (ref 11.7–15.7)
MCH RBC QN AUTO: 28.7 PG (ref 26.5–33)
MCHC RBC AUTO-ENTMCNC: 30.7 G/DL (ref 31.5–36.5)
MCV RBC AUTO: 94 FL (ref 78–100)
PLATELET # BLD AUTO: 470 10E3/UL (ref 150–450)
POTASSIUM SERPL-SCNC: 4.1 MMOL/L (ref 3.4–5.3)
RBC # BLD AUTO: 3.07 10E6/UL (ref 3.8–5.2)
SODIUM SERPL-SCNC: 138 MMOL/L (ref 135–145)
WBC # BLD AUTO: 9.4 10E3/UL (ref 4–11)

## 2024-01-03 PROCEDURE — 80048 BASIC METABOLIC PNL TOTAL CA: CPT | Performed by: NURSE PRACTITIONER

## 2024-01-03 PROCEDURE — 85027 COMPLETE CBC AUTOMATED: CPT | Performed by: NURSE PRACTITIONER

## 2024-01-03 PROCEDURE — 36415 COLL VENOUS BLD VENIPUNCTURE: CPT | Performed by: NURSE PRACTITIONER

## 2024-01-03 PROCEDURE — P9603 ONE-WAY ALLOW PRORATED MILES: HCPCS | Performed by: NURSE PRACTITIONER

## 2024-01-03 PROCEDURE — 99309 SBSQ NF CARE MODERATE MDM 30: CPT | Performed by: NURSE PRACTITIONER

## 2024-01-03 NOTE — PROGRESS NOTES
Clinic Care Coordination Contact  [facility]    Clinical Data: Care Coordinator Outreach        Left message on patient's voicemail with call back information and requested return call.    Plan: Care Coordinator  via . Care Coordinator will try to reach patient again in 1-2 business days.

## 2024-01-03 NOTE — PROGRESS NOTES
[facility] GERIATRICS    PRIMARY CARE PROVIDER AND CLINIC:  Stacey Morgan MD, [address] / [place] [address]  Chief Complaint   Patient presents with    Hospital F/U      [place] Medical Record Number:  [id number]  Place of Service where encounter took place:  [facility] (U) [[id number]]    Claudia Mckeon  is a 79 year old  (1945), admitted to the above facility from  [facility]. Hospital stay 12/27/23 through 1/2/24..   HPI:  Claudia has a hx of DM2, dementia, htn, hld who fell at her home where she lives with her daughter. She was brought to [facility] and found to have a R hip fx and went to the OR on 12/27/23. She is discharged on duricef for prophylaxes. .   Her A1c in hospital was >15%. She is now on lantus. Novolog, metformin and jardiance. Unable to view BG log in PCC due to system being down. Nursing reports BG stable in the 150-250s. Hgb dropped to 8.8 from 9 from pta 13.     She is seen with her daughter and Leanne  at bedside. She is lying in bed. Her daughter does much of the speaking and answering of questions. Quite a bit of time spent reviewing medications, pain management, therapy expectations Daughter is concerned about pain control. She thinks nursing has not given any pain medications however they report they did give them with 8am meds. Daughter asks if she needs to go to the pharmacy to get the pain medications for her mom. I explained how [facility] works with nursing staff. She was also concerned about fluids and fatigue and asked if we can give her mom IV fluids in the TCU. I explained reasoning for IV fluids and that they are not indicated right now, encouraged PO intake of fluids instead. I also suggested scheduling her tylenol which she is agreeable to. Incision is healing well and c/d/I. Minimal swelling to R leg.   She is quite confused and daughter speaks for her the majority of the visit.     CODE STATUS/ADVANCE DIRECTIVES DISCUSSION:   Prior  CPR/Full code   ALLERGIES:   Allergies   Allergen Reactions    Ibuprofen Rash      PAST MEDICAL HISTORY:   Past Medical History:   Diagnosis Date    HTN (hypertension)       PAST SURGICAL HISTORY:   has a past surgical history that includes Breast Cyst Aspiration (Left) and Open reduction internal fixation hip nailing (Right, 12/27/2023).  FAMILY HISTORY: family history is not on file.  SOCIAL HISTORY:   reports that she has never smoked. She has never been exposed to tobacco smoke. She has never used smokeless tobacco.  Patient's living condition: lives with family, daughter, granddaughters     Post Discharge Medication Reconciliation Status:   MED REC REQUIRED  Post Medication Reconciliation Status:  Discharge medications reconciled, continue medications without change       Current Outpatient Medications   Medication Sig    acetaminophen (TYLENOL) 325 MG tablet Take 2 tablets (650 mg) by mouth every 4 hours as needed for other (mild pain)    aspirin 81 MG EC tablet Take 1 tablet (81 mg) by mouth 2 times daily    atorvastatin (LIPITOR) 20 MG tablet Take 20 mg by mouth daily    blood glucose (NO BRAND SPECIFIED) lancets standard Use to test blood sugar 2 times daily or as directed.    blood glucose (NO BRAND SPECIFIED) test strip Use to test blood sugar 2 times daily or as directed.    blood sugar diagnostic (ONE TOUCH ULTRA TEST) Strp [BLOOD SUGAR DIAGNOSTIC (ONE TOUCH ULTRA TEST) STRP] Use As Directed daily. Test one time.   Blue In Vitro    cefadroxil (DURICEF) 500 MG capsule Take 1 capsule (500 mg) by mouth 2 times daily for 14 days Stop taking 2 weeks after surgery.    empagliflozin (JARDIANCE) 10 MG TABS tablet Take 1 tablet (10 mg) by mouth daily    insulin aspart (NOVOLOG PEN) 100 UNIT/ML pen Inject 5 Units Subcutaneous daily (with dinner)    insulin aspart (NOVOLOG PEN) 100 UNIT/ML pen Inject 5 Units Subcutaneous daily (with lunch)    insulin aspart (NOVOLOG PEN) 100 UNIT/ML pen Inject 5 Units  "Subcutaneous daily (with breakfast)    insulin glargine (LANTUS PEN) 100 UNIT/ML pen Inject 13 Units Subcutaneous at bedtime    metFORMIN (GLUCOPHAGE XR) 500 MG 24 hr tablet Take 1 tablet (500 mg) by mouth 2 times daily (with meals)    oxyCODONE (ROXICODONE) 5 MG tablet Take 0.5 tablets (2.5 mg) by mouth every 4 hours as needed for moderate pain    polyethylene glycol (MIRALAX) 17 g packet Take 17 g by mouth daily    senna-docusate (SENOKOT-S/PERICOLACE) 8.6-50 MG tablet Take 1-2 tablets by mouth 2 times daily Take while on oral narcotics to prevent or treat constipation.     No current facility-administered medications for this visit.       ROS:  4 point ROS including Respiratory, CV, GI and , other than that noted in the HPI,  is negative    Vitals:  /82   Pulse 108   Temp (!) 96.3  F (35.7  C)   Resp 18   Ht 1.488 m (4' 10.6\")   Wt 51.3 kg (113 lb 1.9 oz)   SpO2 96%   BMI 23.16 kg/m    Exam:  General appearance: alert, cooperative.   Lungs: respirations unlabored,no wheezing or rales.   Cardiovascular: Regular rate and rhythm.   ABDOMEN: Globular and soft, non tender.    Extremities: extremities normal, atraumatic, trace edema.   Skin: No rashes or lesions  Neurologic: oriented. No focal deficits.   Psych: interacts well with caregivers,  pleasant    Lab/Diagnostic data:  Recent labs in [facility] reviewed by me today.     ASSESSMENT/PLAN:    1. Closed right hip fracture, sequela    2. DM2 (diabetes mellitus, type 2) (H)    3. Osteopenia    4. Normocytic anemia        R hip fx: Schedule tylenol 1000mg po q 8 hours; discontinue current tyelnol prn orders. Ice to R hip 20 mins on, q 4 hours prn, Asked nursing to check in regarding pain q shift. Offer prn meds.   DM2: A1c >15. Continue current orders-Metformin, jardiance, novolog. Adjust insulin as needed for BG. Currently stable.   Osteopenia: follow up outpatient with pcp.   Anemia: hgb 13 -->9, 8.8 today. Stable.     Electronically signed by:  Brigitte " Letty, LONNY

## 2024-01-03 NOTE — LETTER
1/3/2024        RE: Claudia May  [address]  [place] [address]        [facility] GERIATRICS    PRIMARY CARE PROVIDER AND CLINIC:  Stacey Morgan MD[facility] [address] [facility] [place] [address]  Chief Complaint   Patient presents with     Hospital F/U      [place] Medical Record Number:  [id number]  Place of Service where encounter took place:  [facility] (U) [[id number]]    Claudia Mckeon  is a 79 year old  (1945), admitted to the above facility from  [facility]. Hospital stay 12/27/23 through 1/2/24..   HPI:  Claudia has a hx of DM2, dementia, htn, hld who fell at her home where she lives with her daughter. She was brought to [facility] and found to have a R hip fx and went to the OR on 12/27/23. She is discharged on duricef for prophylaxes. .   Her A1c in hospital was >15%. She is now on lantus. Novolog, metformin and jardiance. Unable to view BG log in PCC due to system being down. Nursing reports BG stable in the 150-250s. Hgb dropped to 8.8 from 9 from pta 13.     She is seen with her daughter and Leanne  at bedside. She is lying in bed. Her daughter does much of the speaking and answering of questions. Quite a bit of time spent reviewing medications, pain management, therapy expectations Daughter is concerned about pain control. She thinks nursing has not given any pain medications however they report they did give them with 8am meds. Daughter asks if she needs to go to the pharmacy to get the pain medications for her mom. I explained how [facility] works with nursing staff. She was also concerned about fluids and fatigue and asked if we can give her mom IV fluids in the TCU. I explained reasoning for IV fluids and that they are not indicated right now, encouraged PO intake of fluids instead. I also suggested scheduling her tylenol which she is agreeable to. Incision is healing well and c/d/I. Minimal swelling to R leg.   She is quite confused and daughter  speaks for her the majority of the visit.     CODE STATUS/ADVANCE DIRECTIVES DISCUSSION:  Prior  CPR/Full code   ALLERGIES:   Allergies   Allergen Reactions     Ibuprofen Rash      PAST MEDICAL HISTORY:   Past Medical History:   Diagnosis Date     HTN (hypertension)       PAST SURGICAL HISTORY:   has a past surgical history that includes Breast Cyst Aspiration (Left) and Open reduction internal fixation hip nailing (Right, 12/27/2023).  FAMILY HISTORY: family history is not on file.  SOCIAL HISTORY:   reports that she has never smoked. She has never been exposed to tobacco smoke. She has never used smokeless tobacco.  Patient's living condition: lives with family, daughter, granddaughters     Post Discharge Medication Reconciliation Status:   MED REC REQUIRED  Post Medication Reconciliation Status:  Discharge medications reconciled, continue medications without change       Current Outpatient Medications   Medication Sig     acetaminophen (TYLENOL) 325 MG tablet Take 2 tablets (650 mg) by mouth every 4 hours as needed for other (mild pain)     aspirin 81 MG EC tablet Take 1 tablet (81 mg) by mouth 2 times daily     atorvastatin (LIPITOR) 20 MG tablet Take 20 mg by mouth daily     blood glucose (NO BRAND SPECIFIED) lancets standard Use to test blood sugar 2 times daily or as directed.     blood glucose (NO BRAND SPECIFIED) test strip Use to test blood sugar 2 times daily or as directed.     blood sugar diagnostic (ONE TOUCH ULTRA TEST) Strp [BLOOD SUGAR DIAGNOSTIC (ONE TOUCH ULTRA TEST) STRP] Use As Directed daily. Test one time.   Blue In Vitro     cefadroxil (DURICEF) 500 MG capsule Take 1 capsule (500 mg) by mouth 2 times daily for 14 days Stop taking 2 weeks after surgery.     empagliflozin (JARDIANCE) 10 MG TABS tablet Take 1 tablet (10 mg) by mouth daily     insulin aspart (NOVOLOG PEN) 100 UNIT/ML pen Inject 5 Units Subcutaneous daily (with dinner)     insulin aspart (NOVOLOG PEN) 100 UNIT/ML pen Inject 5  "Units Subcutaneous daily (with lunch)     insulin aspart (NOVOLOG PEN) 100 UNIT/ML pen Inject 5 Units Subcutaneous daily (with breakfast)     insulin glargine (LANTUS PEN) 100 UNIT/ML pen Inject 13 Units Subcutaneous at bedtime     metFORMIN (GLUCOPHAGE XR) 500 MG 24 hr tablet Take 1 tablet (500 mg) by mouth 2 times daily (with meals)     oxyCODONE (ROXICODONE) 5 MG tablet Take 0.5 tablets (2.5 mg) by mouth every 4 hours as needed for moderate pain     polyethylene glycol (MIRALAX) 17 g packet Take 17 g by mouth daily     senna-docusate (SENOKOT-S/PERICOLACE) 8.6-50 MG tablet Take 1-2 tablets by mouth 2 times daily Take while on oral narcotics to prevent or treat constipation.     No current facility-administered medications for this visit.       ROS:  4 point ROS including Respiratory, CV, GI and , other than that noted in the HPI,  is negative    Vitals:  /82   Pulse 108   Temp (!) 96.3  F (35.7  C)   Resp 18   Ht 1.488 m (4' 10.6\")   Wt 51.3 kg (113 lb 1.9 oz)   SpO2 96%   BMI 23.16 kg/m    Exam:  General appearance: alert, cooperative.   Lungs: respirations unlabored,no wheezing or rales.   Cardiovascular: Regular rate and rhythm.   ABDOMEN: Globular and soft, non tender.    Extremities: extremities normal, atraumatic, trace edema.   Skin: No rashes or lesions  Neurologic: oriented. No focal deficits.   Psych: interacts well with caregivers,  pleasant    Lab/Diagnostic data:  Recent labs in [facility] reviewed by me today.     ASSESSMENT/PLAN:    1. Closed right hip fracture, sequela    2. DM2 (diabetes mellitus, type 2) (H)    3. Osteopenia    4. Normocytic anemia        R hip fx: Schedule tylenol 1000mg po q 8 hours; discontinue current tyelnol prn orders. Ice to R hip 20 mins on, q 4 hours prn, Asked nursing to check in regarding pain q shift. Offer prn meds.   DM2: A1c >15. Continue current orders-Metformin, jardiance, novolog. Adjust insulin as needed for BG. Currently stable.   Osteopenia: follow " up outpatient with pcp.   Anemia: hgb 13 -->9, 8.8 today. Stable.     Electronically signed by:  Brigitte Saenz CNP                   Sincerely,        Brigitte Saenz, CNP

## 2024-01-04 ENCOUNTER — PATIENT OUTREACH (OUTPATIENT)
Dept: CARE COORDINATION | Facility: CLINIC | Age: 79
End: 2024-01-04
Payer: COMMERCIAL

## 2024-01-04 NOTE — PROGRESS NOTES
Clinic Care Coordination Contact  Follow Up Progress Note      Assessment: The pt was recently discharged from the hospital. I called to check up on the pt and help the pt setup a hospital follow up. I called and talked to the pt daughter, she stated that the pt was discharged to a TCU, and will be there for about two weeks.     Care Gaps:    Health Maintenance Due   Topic Date Due    DEXA  Never done    ADVANCE CARE PLANNING  Never done    EYE EXAM  Never done    RSV VACCINE (Pregnancy & 60+) (1 - 1-dose 60+ series) Never done    MEDICARE ANNUAL WELLNESS VISIT  Never done    FALL RISK ASSESSMENT  Never done    ZOSTER IMMUNIZATION (2 of 3) 03/31/2008    COVID-19 Vaccine (4 - 2023-24 season) 09/01/2023    PHQ-2 (once per calendar year)  01/01/2024           Care Plans      Intervention/Education provided during outreach:               Plan:     Care Coordinator will follow up in

## 2024-01-08 ENCOUNTER — TRANSITIONAL CARE UNIT VISIT (OUTPATIENT)
Dept: GERIATRICS | Facility: CLINIC | Age: 79
End: 2024-01-08
Payer: COMMERCIAL

## 2024-01-08 VITALS
TEMPERATURE: 96.7 F | SYSTOLIC BLOOD PRESSURE: 132 MMHG | WEIGHT: 112.6 LBS | HEART RATE: 99 BPM | HEIGHT: 55 IN | DIASTOLIC BLOOD PRESSURE: 49 MMHG | OXYGEN SATURATION: 94 % | RESPIRATION RATE: 16 BRPM | BODY MASS INDEX: 26.06 KG/M2

## 2024-01-08 DIAGNOSIS — E11.65 TYPE 2 DIABETES MELLITUS WITH HYPERGLYCEMIA, UNSPECIFIED WHETHER LONG TERM INSULIN USE (H): ICD-10-CM

## 2024-01-08 DIAGNOSIS — S72.001S CLOSED RIGHT HIP FRACTURE, SEQUELA: Primary | ICD-10-CM

## 2024-01-08 DIAGNOSIS — F03.90 DEMENTIA WITHOUT BEHAVIORAL DISTURBANCE, PSYCHOTIC DISTURBANCE, MOOD DISTURBANCE, OR ANXIETY, UNSPECIFIED DEMENTIA SEVERITY, UNSPECIFIED DEMENTIA TYPE (H): ICD-10-CM

## 2024-01-08 PROCEDURE — 99309 SBSQ NF CARE MODERATE MDM 30: CPT | Performed by: NURSE PRACTITIONER

## 2024-01-08 RX ORDER — ACETAMINOPHEN 500 MG
1000 TABLET ORAL 3 TIMES DAILY
COMMUNITY
End: 2024-05-09

## 2024-01-08 NOTE — LETTER
"    1/8/2024        RE: Claudia May  [address]  [place] [address]        M [facility] GERIATRICS    Chief Complaint   Patient presents with     RECHECK     HPI:  Claudia Mckeon is a 79 year old  (1945), who is being seen today for an episodic care visit at: [facility] (U) [[id number]]. Patient admitted to this TCU after [facility] stay 12/27/23 to 1/2/24. PMH uncontrolled DM2, dementia, HTN, and HLD. She presented after a fall with right hip pain, inability to stand. She was found to have an intertrochanteric fracture of right femur. She underwent ORIF on 12/27/23. Recent A1c 15%. She was started on insulin in the hospital.     Today's concern is:   Patient is not a reliable historian even with . No family present today. No concerns per nursing. Blood sugars have been 200s a few times, but majority 130-180.   -120s/50-60s.    Allergies, and PMH/PSH reviewed in EPIC today.  REVIEW OF SYSTEMS:  Unobtainable secondary to cognitive impairment.     Objective:   /49   Pulse 99   Temp (!) 96.7  F (35.9  C)   Resp 16   Ht 1.397 m (4' 7\")   Wt 51.1 kg (112 lb 9.6 oz)   SpO2 94%   BMI 26.17 kg/m    GENERAL APPEARANCE:  Alert, in no distress  EYES:  EOM normal, conjunctiva and lids normal  RESP:  no respiratory distress  PSYCH:  insight and judgement impaired, memory impaired     Recent labs in [facility] reviewed by me today.     Assessment/Plan:  (S72.001S) Closed right hip fracture, sequela  (primary encounter diagnosis)  Comment: s/p ORIF. No s/sx DVT. No s/sx poor wound healing. Goal is to discharge home when PT/OT goals met.  Plan: PT/OT eval and treat, discharge planning per their recommendation. Continue ASA, monitor s/sx DVT. Monitor incision. F/u ortho as scheduled.    (E11.65) Type 2 diabetes mellitus with hyperglycemia, unspecified whether long term insulin use (H)  Comment: Control improved. Would not want to risk hypoglycemia at this time, will not increase " insulin.  Plan: Continue current POC with no changes at this time and adjustments as needed.    (F03.90) Dementia without behavioral disturbance, psychotic disturbance, mood disturbance, or anxiety, unspecified dementia severity, unspecified dementia type (H)  Comment: Chronic, progressive. HPI/ROS difficult to obtain with cognitive impairment.   Plan: OT eval and treat. Confirm 24 hour supervision at home      Electronically signed by: LAURA Rose CNP           Sincerely,        LAURA Rose CNP

## 2024-01-08 NOTE — PROGRESS NOTES
"[facility] GERIATRICS    Chief Complaint   Patient presents with    RECHECK     HPI:  Claudia Mckeon is a 79 year old  (1945), who is being seen today for an episodic care visit at: [facility] (U) [[id number]]. Patient admitted to this TCU after [facility] stay 12/27/23 to 1/2/24. PMH uncontrolled DM2, dementia, HTN, and HLD. She presented after a fall with right hip pain, inability to stand. She was found to have an intertrochanteric fracture of right femur. She underwent ORIF on 12/27/23. Recent A1c 15%. She was started on insulin in the hospital.     Today's concern is:   Patient is not a reliable historian even with . No family present today. No concerns per nursing. Blood sugars have been 200s a few times, but majority 130-180.   -120s/50-60s.    Allergies, and PMH/PSH reviewed in EPIC today.  REVIEW OF SYSTEMS:  Unobtainable secondary to cognitive impairment.     Objective:   /49   Pulse 99   Temp (!) 96.7  F (35.9  C)   Resp 16   Ht 1.397 m (4' 7\")   Wt 51.1 kg (112 lb 9.6 oz)   SpO2 94%   BMI 26.17 kg/m    GENERAL APPEARANCE:  Alert, in no distress  EYES:  EOM normal, conjunctiva and lids normal  RESP:  no respiratory distress  PSYCH:  insight and judgement impaired, memory impaired     Recent labs in EPIC reviewed by me today.     Assessment/Plan:  (S72.001S) Closed right hip fracture, sequela  (primary encounter diagnosis)  Comment: s/p ORIF. No s/sx DVT. No s/sx poor wound healing. Goal is to discharge home when PT/OT goals met.  Plan: PT/OT eval and treat, discharge planning per their recommendation. Continue ASA, monitor s/sx DVT. Monitor incision. F/u ortho as scheduled.    (E11.65) Type 2 diabetes mellitus with hyperglycemia, unspecified whether long term insulin use (H)  Comment: Control improved. Would not want to risk hypoglycemia at this time, will not increase insulin.  Plan: Continue current POC with no changes at this time and adjustments as " needed.    (F03.90) Dementia without behavioral disturbance, psychotic disturbance, mood disturbance, or anxiety, unspecified dementia severity, unspecified dementia type (H)  Comment: Chronic, progressive. HPI/ROS difficult to obtain with cognitive impairment.   Plan: OT eval and treat. Confirm 24 hour supervision at home      Electronically signed by: LAURA Rose CNP

## 2024-01-11 ENCOUNTER — TRANSITIONAL CARE UNIT VISIT (OUTPATIENT)
Dept: GERIATRICS | Facility: CLINIC | Age: 79
End: 2024-01-11
Payer: COMMERCIAL

## 2024-01-11 VITALS
WEIGHT: 112.6 LBS | DIASTOLIC BLOOD PRESSURE: 72 MMHG | RESPIRATION RATE: 16 BRPM | BODY MASS INDEX: 26.06 KG/M2 | OXYGEN SATURATION: 96 % | TEMPERATURE: 96.8 F | HEART RATE: 86 BPM | HEIGHT: 55 IN | SYSTOLIC BLOOD PRESSURE: 128 MMHG

## 2024-01-11 DIAGNOSIS — F03.90 DEMENTIA WITHOUT BEHAVIORAL DISTURBANCE, PSYCHOTIC DISTURBANCE, MOOD DISTURBANCE, OR ANXIETY, UNSPECIFIED DEMENTIA SEVERITY, UNSPECIFIED DEMENTIA TYPE (H): ICD-10-CM

## 2024-01-11 DIAGNOSIS — S72.144D CLOSED NONDISPLACED INTERTROCHANTERIC FRACTURE OF RIGHT FEMUR WITH ROUTINE HEALING, SUBSEQUENT ENCOUNTER: ICD-10-CM

## 2024-01-11 DIAGNOSIS — D62 ACUTE BLOOD LOSS ANEMIA: ICD-10-CM

## 2024-01-11 DIAGNOSIS — Z47.89 AFTERCARE FOLLOWING SURGERY OF THE MUSCULOSKELETAL SYSTEM: Primary | ICD-10-CM

## 2024-01-11 DIAGNOSIS — I10 PRIMARY HYPERTENSION: ICD-10-CM

## 2024-01-11 DIAGNOSIS — E11.69 TYPE 2 DIABETES MELLITUS WITH OTHER SPECIFIED COMPLICATION, WITHOUT LONG-TERM CURRENT USE OF INSULIN (H): ICD-10-CM

## 2024-01-11 DIAGNOSIS — R53.81 PHYSICAL DECONDITIONING: ICD-10-CM
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
## 2024-01-11 PROCEDURE — 99305 1ST NF CARE MODERATE MDM 35: CPT | Performed by: INTERNAL MEDICINE

## 2024-01-11 NOTE — LETTER
"    1/11/2024        RE: Claudia May  [address]  [place] [address]        M [facility] GERIATRICS  INITIAL VISIT NOTE  January 11, 2024    PRIMARY CARE PROVIDER AND CLINIC:  Stacey Morgan [address] / [place] [address]    M [facility] Medical Record Number:  [id number]  Place of Service where encounter took place:  [facility] ([facility]) [[id number]]    Chief Complaint   Patient presents with     Hospital F/U       HPI:    Claudia Mckeon is a 79 year old  (1945[facility] Medical history is notable for dementia, HTN and DM 2. She was hospitalized at [facility] from 12/27/23 to 1/2/24 where she presented with hip pain after a fall and was found to have a R intertrochanteric femur fracture s/p ORIF (12/27/23). She developed acute blood loss anemia (Hgb 13.5 --> 8.7). She had recently been seen at [facility] to establish care - A1c >15%. She was only on a statin PTA. She is new on insulin, empagliflozin and metformin. She was admitted to this facility for  rehab, medical management, and nursing care.      History obtained from: facility chart records, facility staff, patient report, [facility] chart review, and [facility] chart review.      Today, Ms. Mckeon is seen in her room sitting in a wheelchair. She handed me a warm ice pack - I got her a cold one and she placed it in a pillow case, put in on her bed and then proceeded to self transfer quite skillfully into bed, placing the ice pack along her R hip. She then said \"thank you\" and closed her eyes to rest. Daughter was not in facility at the time of my visit. No acute concerns per nursing. She is working with therapies.     CODE STATUS: CPR/Full code     ALLERGIES:  Allergies   Allergen Reactions     Ibuprofen Rash       PAST MEDICAL HISTORY:   Past Medical History:   Diagnosis Date     HTN (hypertension)        PAST SURGICAL HISTORY:   Past Surgical History:   Procedure Laterality Date "     BREAST CYST ASPIRATION Left     left breast abcess drained     OPEN REDUCTION INTERNAL FIXATION HIP NAILING Right 12/27/2023    Procedure: INTERNAL FIXATION, FRACTURE, TROCHANTERIC, RIGHT HIP, USING RODS;  Surgeon: Eugenio Waldron MD;  Location: [facility] Main OR       FAMILY HISTORY:   Unable to review due to cognitive impairment    SOCIAL HISTORY:   Patient's living condition:  with family in a house    MEDICATIONS:  Post Discharge Medication Reconciliation Status: discharge medications reconciled and changed, per note/orders.  Current Outpatient Medications   Medication Sig Dispense Refill     acetaminophen (TYLENOL) 500 MG tablet Take 1,000 mg by mouth 3 times daily       aspirin 81 MG EC tablet Take 1 tablet (81 mg) by mouth 2 times daily 60 tablet 0     atorvastatin (LIPITOR) 20 MG tablet Take 20 mg by mouth daily       empagliflozin (JARDIANCE) 10 MG TABS tablet Take 1 tablet (10 mg) by mouth daily       insulin aspart (NOVOLOG PEN) 100 UNIT/ML pen Inject 5 Units Subcutaneous 3 times daily (with meals)       insulin glargine (LANTUS PEN) 100 UNIT/ML pen Inject 13 Units Subcutaneous at bedtime       metFORMIN (GLUCOPHAGE XR) 500 MG 24 hr tablet Take 1 tablet (500 mg) by mouth 2 times daily (with meals)       oxyCODONE (ROXICODONE) 5 MG tablet Take 0.5 tablets (2.5 mg) by mouth every 4 hours as needed for moderate pain 10 tablet 0     polyethylene glycol (MIRALAX) 17 g packet Take 17 g by mouth daily 7 packet 0     senna-docusate (SENOKOT-S/PERICOLACE) 8.6-50 MG tablet Take 1-2 tablets by mouth 2 times daily Take while on oral narcotics to prevent or treat constipation. (Patient taking differently: Take 1 tablet by mouth 2 times daily Take while on oral narcotics to prevent or treat constipation.) 30 tablet 0     blood glucose (NO BRAND SPECIFIED) lancets standard Use to test blood sugar 2 times daily or as directed. 100 each 11     blood glucose (NO BRAND SPECIFIED) test strip Use to test blood sugar 2  "times daily or as directed. 100 strip 11     blood sugar diagnostic (ONE TOUCH ULTRA TEST) Strp [BLOOD SUGAR DIAGNOSTIC (ONE TOUCH ULTRA TEST) STRP] Use As Directed daily. Test one time.   Blue In Vitro         ROS:  Unable to obtain due to cognitive impairment or aphasia    PHYSICAL EXAM:  /72   Pulse 86   Temp 96.8  F (36  C)   Resp 16   Ht 1.397 m (4' 7\")   Wt 51.1 kg (112 lb 9.6 oz)   SpO2 96%   BMI 26.17 kg/m    Gen: sitting in wheelchair, alert, cooperative and in no acute distress  Resp: breathing non labored, no tachypnea   Ext: no LE edema  Neuro: CX II-XII grossly in tact; ROM in all four extremities grossly in tact     LABORATORY/IMAGING DATA:  Reviewed as per [facility] and/or [facility]    ASSESSMENT/PLAN:    Right Intertrochanteric Femur Fracture s/p ORIF (12/22/23)  Secondary to a fall  -- WBAT  -- DVT ppx with ASA 81 mg BID x30 days per ortho  -- analgesia with APAP 1000 mg TID, oxycodone 2.5 mg q4h PRN   -- PT/OT  -- follow up with ortho as scheduled    Acute Blood Loss Anemia  Secondary to above. No evidence of ongoing bleeding. Hgb 13.5 --> 8.7. Recheck 8.8 at TCU the day after hospital discharge.   -- follow clinically     DM, Type II - Uncontrolled  A1c >15. New on insulin, empagliflozin and metformin during hospitalization. Sugars 120s-180s (am), 160-220 most <200 (noon) and 160s-170s (price).   -- empagliflozin 10 mg daily, metformin 500 mg BID  -- glargine 13 units at bedtime and aspart 5 units TID AC  -- follow sugars and adjust insulin as needed    HTN  SBPs 120s-130s. Not on any medications for this.   -- follow BPs and add medications as needed    Dementia Without Behavioral Disturbance  Lives with family  -- OT following    HLD  -- atorvastatin 20 mg daily    Slow Transit Constipation  -- Miralax 17g daily and Senna-S 1 tab BID  -- adjust bowel regimen as needed    Fall  Physical Deconditioning  In setting of hospitalization and underlying medical conditions  -- ongoing " PT/OT      Electronically signed by:  Melony Rogel MD                          Sincerely,        Melony Rogel MD

## 2024-01-12 ENCOUNTER — TELEPHONE (OUTPATIENT)
Dept: FAMILY MEDICINE | Facility: CLINIC | Age: 79
End: 2024-01-12
Payer: COMMERCIAL

## 2024-01-12 ENCOUNTER — TRANSFERRED RECORDS (OUTPATIENT)
Dept: MULTI SPECIALTY CLINIC | Facility: CLINIC | Age: 79
End: 2024-01-12

## 2024-01-12 LAB — RETINOPATHY: NORMAL

## 2024-01-12 NOTE — PROGRESS NOTES
"[facility] GERIATRICS  INITIAL VISIT NOTE  January 11, 2024    PRIMARY CARE PROVIDER AND CLINIC:  Stacey Morgan [address] / [place] [address]    [facility] Medical Record Number:  [id number]  Place of Service where encounter took place:  [facility] ([facility]) [[id number]]    Chief Complaint   Patient presents with    Hospital F/U       HPI:    Claudia Mckeon is a 79 year old  (1945[facility] Medical history is notable for dementia, HTN and DM 2. She was hospitalized at [facility] from 12/27/23 to 1/2/24 where she presented with hip pain after a fall and was found to have a R intertrochanteric femur fracture s/p ORIF (12/27/23). She developed acute blood loss anemia (Hgb 13.5 --> 8.7). She had recently been seen at [facility] to establish care - A1c >15%. She was only on a statin PTA. She is new on insulin, empagliflozin and metformin. She was admitted to this facility for  rehab, medical management, and nursing care.      History obtained from: facility chart records, facility staff, patient report, [facility] chart review, and [facility] chart review.      Today, Ms. Mckeon is seen in her room sitting in a wheelchair. She handed me a warm ice pack - I got her a cold one and she placed it in a pillow case, put in on her bed and then proceeded to self transfer quite skillfully into bed, placing the ice pack along her R hip. She then said \"thank you\" and closed her eyes to rest. Daughter was not in facility at the time of my visit. No acute concerns per nursing. She is working with therapies.     CODE STATUS: CPR/Full code     ALLERGIES:  Allergies   Allergen Reactions    Ibuprofen Rash       PAST MEDICAL HISTORY:   Past Medical History:   Diagnosis Date    HTN (hypertension)        PAST SURGICAL HISTORY:   Past Surgical History:   Procedure Laterality Date    BREAST CYST ASPIRATION Left     left breast abcess drained    OPEN REDUCTION INTERNAL " FIXATION HIP NAILING Right 12/27/2023    Procedure: INTERNAL FIXATION, FRACTURE, TROCHANTERIC, RIGHT HIP, USING RODS;  Surgeon: Eugenio Waldron MD;  Location: [facility] Main OR       FAMILY HISTORY:   Unable to review due to cognitive impairment    SOCIAL HISTORY:   Patient's living condition:  with family in a house    MEDICATIONS:  Post Discharge Medication Reconciliation Status: discharge medications reconciled and changed, per note/orders.  Current Outpatient Medications   Medication Sig Dispense Refill    acetaminophen (TYLENOL) 500 MG tablet Take 1,000 mg by mouth 3 times daily      aspirin 81 MG EC tablet Take 1 tablet (81 mg) by mouth 2 times daily 60 tablet 0    atorvastatin (LIPITOR) 20 MG tablet Take 20 mg by mouth daily      empagliflozin (JARDIANCE) 10 MG TABS tablet Take 1 tablet (10 mg) by mouth daily      insulin aspart (NOVOLOG PEN) 100 UNIT/ML pen Inject 5 Units Subcutaneous 3 times daily (with meals)      insulin glargine (LANTUS PEN) 100 UNIT/ML pen Inject 13 Units Subcutaneous at bedtime      metFORMIN (GLUCOPHAGE XR) 500 MG 24 hr tablet Take 1 tablet (500 mg) by mouth 2 times daily (with meals)      oxyCODONE (ROXICODONE) 5 MG tablet Take 0.5 tablets (2.5 mg) by mouth every 4 hours as needed for moderate pain 10 tablet 0    polyethylene glycol (MIRALAX) 17 g packet Take 17 g by mouth daily 7 packet 0    senna-docusate (SENOKOT-S/PERICOLACE) 8.6-50 MG tablet Take 1-2 tablets by mouth 2 times daily Take while on oral narcotics to prevent or treat constipation. (Patient taking differently: Take 1 tablet by mouth 2 times daily Take while on oral narcotics to prevent or treat constipation.) 30 tablet 0    blood glucose (NO BRAND SPECIFIED) lancets standard Use to test blood sugar 2 times daily or as directed. 100 each 11    blood glucose (NO BRAND SPECIFIED) test strip Use to test blood sugar 2 times daily or as directed. 100 strip 11    blood sugar diagnostic (ONE TOUCH ULTRA TEST) Strp [BLOOD SUGAR  "DIAGNOSTIC (ONE TOUCH ULTRA TEST) STRP] Use As Directed daily. Test one time.   Blue In Vitro         ROS:  Unable to obtain due to cognitive impairment or aphasia    PHYSICAL EXAM:  /72   Pulse 86   Temp 96.8  F (36  C)   Resp 16   Ht 1.397 m (4' 7\")   Wt 51.1 kg (112 lb 9.6 oz)   SpO2 96%   BMI 26.17 kg/m    Gen: sitting in wheelchair, alert, cooperative and in no acute distress  Resp: breathing non labored, no tachypnea   Ext: no LE edema  Neuro: CX II-XII grossly in tact; ROM in all four extremities grossly in tact     LABORATORY/IMAGING DATA:  Reviewed as per [facility] and/or [facility]    ASSESSMENT/PLAN:    Right Intertrochanteric Femur Fracture s/p ORIF (12/22/23)  Secondary to a fall  -- WBAT  -- DVT ppx with ASA 81 mg BID x30 days per ortho  -- analgesia with APAP 1000 mg TID, oxycodone 2.5 mg q4h PRN   -- PT/OT  -- follow up with ortho as scheduled    Acute Blood Loss Anemia  Secondary to above. No evidence of ongoing bleeding. Hgb 13.5 --> 8.7. Recheck 8.8 at TCU the day after hospital discharge.   -- follow clinically     DM, Type II - Uncontrolled  A1c >15. New on insulin, empagliflozin and metformin during hospitalization. Sugars 120s-180s (am), 160-220 most <200 (noon) and 160s-170s (price).   -- empagliflozin 10 mg daily, metformin 500 mg BID  -- glargine 13 units at bedtime and aspart 5 units TID AC  -- follow sugars and adjust insulin as needed    HTN  SBPs 120s-130s. Not on any medications for this.   -- follow BPs and add medications as needed    Dementia Without Behavioral Disturbance  Lives with family  -- OT following    HLD  -- atorvastatin 20 mg daily    Slow Transit Constipation  -- Miralax 17g daily and Senna-S 1 tab BID  -- adjust bowel regimen as needed    Fall  Physical Deconditioning  In setting of hospitalization and underlying medical conditions  -- ongoing PT/OT      Electronically signed by:  Melony Rogel MD                      "

## 2024-01-12 NOTE — TELEPHONE ENCOUNTER
MTM appointment no showed and we will not make another attempt as patient is receiving care at a TCU.    Routing back to referring provider and MTM pharmacist.     [facility]

## 2024-01-15 ENCOUNTER — TRANSITIONAL CARE UNIT VISIT (OUTPATIENT)
Dept: GERIATRICS | Facility: CLINIC | Age: 79
End: 2024-01-15
Payer: COMMERCIAL

## 2024-01-15 VITALS
TEMPERATURE: 97.4 F | OXYGEN SATURATION: 98 % | SYSTOLIC BLOOD PRESSURE: 122 MMHG | HEIGHT: 55 IN | RESPIRATION RATE: 18 BRPM | DIASTOLIC BLOOD PRESSURE: 62 MMHG | HEART RATE: 100 BPM | BODY MASS INDEX: 26.06 KG/M2 | WEIGHT: 112.6 LBS

## 2024-01-15 DIAGNOSIS — S72.144D CLOSED NONDISPLACED INTERTROCHANTERIC FRACTURE OF RIGHT FEMUR WITH ROUTINE HEALING, SUBSEQUENT ENCOUNTER: Primary | ICD-10-CM

## 2024-01-15 DIAGNOSIS — E11.69 TYPE 2 DIABETES MELLITUS WITH OTHER SPECIFIED COMPLICATION, WITHOUT LONG-TERM CURRENT USE OF INSULIN (H): ICD-10-CM

## 2024-01-15 DIAGNOSIS — F03.90 DEMENTIA WITHOUT BEHAVIORAL DISTURBANCE, PSYCHOTIC DISTURBANCE, MOOD DISTURBANCE, OR ANXIETY, UNSPECIFIED DEMENTIA SEVERITY, UNSPECIFIED DEMENTIA TYPE (H): ICD-10-CM

## 2024-01-15 PROCEDURE — 99308 SBSQ NF CARE LOW MDM 20: CPT | Performed by: NURSE PRACTITIONER

## 2024-01-15 NOTE — LETTER
"    1/15/2024        RE: Claudia May  [address]  [place] [address]        M [facility] GERIATRICS    Chief Complaint   Patient presents with     RECHECK     HPI:  Claudia Mckeon is a 79 year old  (1945), who is being seen today for an episodic care visit at: [facility] ([facility]) [[id number]].     Today's concern is:   Patient continues to c/o pain with therapy, asks for an xray, thinks her hip is not healing, but then rated her pain 5/10. She has rarely used the oxycodone. It does not appear that she has had any ortho follow up yet. Advised patient that it is normal to have some pain and she should use the oxycodone if her pain interferes in therapy.     Allergies, and PMH/PSH reviewed in [facility] today.  REVIEW OF SYSTEMS:  Limited secondary to cognitive impairment but today pt reports 4 point ROS including Respiratory, CV, GI and , other than that noted in the HPI,  is negative    Objective:   /62   Pulse 100   Temp 97.4  F (36.3  C)   Resp 18   Ht 1.397 m (4' 7\")   Wt 51.1 kg (112 lb 9.6 oz)   SpO2 98%   BMI 26.17 kg/m    GENERAL APPEARANCE:  Alert, in no distress  RESP:  no respiratory distress  PSYCH:  insight and judgement impaired, memory impaired       Assessment/Plan:  (P86.714Y) Closed nondisplaced intertrochanteric fracture of right femur with routine healing, subsequent encounter  (primary encounter diagnosis)  Comment: s/p ORIF. Pain does not seem to be atypical. No s/sx DVT. No s/sx poor wound healing. Goal is to discharge home when PT/OT goals met.  Plan: PT/OT eval and treat, discharge planning per their recommendation. Continue oxycodone prn for pain, monitor pain severity. Monitor incision. F/u ortho as scheduled.    (E11.69) Type 2 diabetes mellitus with other specified complication, without long-term current use of insulin (H)  Comment: Patient initially had several BG 200s, now well controlled  for several days  Plan: Continue current POC with no changes at this " time and adjustments as needed.    (F03.90) Dementia without behavioral disturbance, psychotic disturbance, mood disturbance, or anxiety, unspecified dementia severity, unspecified dementia type (H)  Comment: This will certainly affect her perception of pain and ability to retain education from therapy and nursing        Electronically signed by: LAURA Rose CNP           Sincerely,        LAURA Rose CNP

## 2024-01-15 NOTE — PROGRESS NOTES
"[facility] GERIATRICS    Chief Complaint   Patient presents with    RECHECK     HPI:  Claudia Mckeon is a 79 year old  (1945), who is being seen today for an episodic care visit at: [facility] ([facility]) [[id number]].     Today's concern is:   Patient continues to c/o pain with therapy, asks for an xray, thinks her hip is not healing, but then rated her pain 5/10. She has rarely used the oxycodone. It does not appear that she has had any ortho follow up yet. Advised patient that it is normal to have some pain and she should use the oxycodone if her pain interferes in therapy.     Allergies, and PMH/PSH reviewed in EPIC today.  REVIEW OF SYSTEMS:  Limited secondary to cognitive impairment but today pt reports 4 point ROS including Respiratory, CV, GI and , other than that noted in the HPI,  is negative    Objective:   /62   Pulse 100   Temp 97.4  F (36.3  C)   Resp 18   Ht 1.397 m (4' 7\")   Wt 51.1 kg (112 lb 9.6 oz)   SpO2 98%   BMI 26.17 kg/m    GENERAL APPEARANCE:  Alert, in no distress  RESP:  no respiratory distress  PSYCH:  insight and judgement impaired, memory impaired       Assessment/Plan:  (D15.378R) Closed nondisplaced intertrochanteric fracture of right femur with routine healing, subsequent encounter  (primary encounter diagnosis)  Comment: s/p ORIF. Pain does not seem to be atypical. No s/sx DVT. No s/sx poor wound healing. Goal is to discharge home when PT/OT goals met.  Plan: PT/OT eval and treat, discharge planning per their recommendation. Continue oxycodone prn for pain, monitor pain severity. Monitor incision. F/u ortho as scheduled.    (E11.69) Type 2 diabetes mellitus with other specified complication, without long-term current use of insulin (H)  Comment: Patient initially had several BG 200s, now well controlled  for several days  Plan: Continue current POC with no changes at this time and adjustments as needed.    (F03.90) Dementia without behavioral disturbance, " psychotic disturbance, mood disturbance, or anxiety, unspecified dementia severity, unspecified dementia type (H)  Comment: This will certainly affect her perception of pain and ability to retain education from therapy and nursing        Electronically signed by: LAURA Rose CNP

## 2024-01-22 ENCOUNTER — DISCHARGE SUMMARY NURSING HOME (OUTPATIENT)
Dept: GERIATRICS | Facility: CLINIC | Age: 79
End: 2024-01-22
Payer: COMMERCIAL

## 2024-01-22 VITALS
HEART RATE: 89 BPM | HEIGHT: 55 IN | RESPIRATION RATE: 16 BRPM | SYSTOLIC BLOOD PRESSURE: 124 MMHG | WEIGHT: 114.2 LBS | BODY MASS INDEX: 26.43 KG/M2 | TEMPERATURE: 96.6 F | OXYGEN SATURATION: 96 % | DIASTOLIC BLOOD PRESSURE: 57 MMHG

## 2024-01-22 DIAGNOSIS — F03.B0 MODERATE DEMENTIA WITHOUT BEHAVIORAL DISTURBANCE, PSYCHOTIC DISTURBANCE, MOOD DISTURBANCE, OR ANXIETY, UNSPECIFIED DEMENTIA TYPE (H): ICD-10-CM

## 2024-01-22 DIAGNOSIS — S72.144D CLOSED NONDISPLACED INTERTROCHANTERIC FRACTURE OF RIGHT FEMUR WITH ROUTINE HEALING: Primary | ICD-10-CM

## 2024-01-22 DIAGNOSIS — Z79.4 TYPE 2 DIABETES MELLITUS WITH OTHER SPECIFIED COMPLICATION, WITH LONG-TERM CURRENT USE OF INSULIN (H): ICD-10-CM

## 2024-01-22 DIAGNOSIS — E11.69 TYPE 2 DIABETES MELLITUS WITH OTHER SPECIFIED COMPLICATION, WITH LONG-TERM CURRENT USE OF INSULIN (H): ICD-10-CM

## 2024-01-22 PROCEDURE — 99316 NF DSCHRG MGMT 30 MIN+: CPT | Performed by: NURSE PRACTITIONER

## 2024-01-22 NOTE — LETTER
1/22/2024        RE: Claudia May  [address]  [place] [address]        [facility] GERIATRICS DISCHARGE SUMMARY  PATIENT'S NAME: Claudia Mckeon  YOB: 1945  MEDICAL RECORD NUMBER:  [id number]  Place of Service where encounter took place:  [facility] (U) [[id number]]    PRIMARY CARE PROVIDER AND CLINIC RESPONSIBLE AFTER TRANSFER:   Stacey Morgan MD, [address] / [place] [address]    [facility] Provider     Transferring providers: LAURA Rose CNP, Melony Rogel MD  Recent Hospitalization/ED:  [facility] stay 12/27/23 to 1/2/24.  Date of SNF Admission: January 02, 2024  Date of SNF (anticipated) Discharge: January 23, 2024  Discharged to: with family       CODE STATUS/ADVANCE DIRECTIVES DISCUSSION:  Full Code   ALLERGIES: Ibuprofen    NURSING FACILITY COURSE   Medication Changes/Rationale:   none    Summary of nursing facility stay:   Claudia Mckeon is a 79 year old  (1945), who is being seen today at: [facility] (U) [[id number]]. Patient admitted to this TCU after [facility] stay 12/27/23 to 1/2/24. PMH uncontrolled DM2, dementia, HTN, and HLD. She presented after a fall with right hip pain, inability to stand. She was found to have an intertrochanteric fracture of right femur. She underwent ORIF on 12/27/23. Recent A1c 15%. She was started on insulin in the hospital.     Closed nondisplaced intertrochanteric fracture of right femur with routine healing  Patient made slow progress with therapy due to c/o pain. Her dementia played a big role in this. She often would not use the oxycodone. She thought that she had pain because her hip wasn't healing well. She is doing better now and is actually asking to go home earlier than planned. Social work has spoken to her family and they are prepared to take her home. They have done caregiver training with therapy. She will have home PT/OT ordered    Moderate dementia without  behavioral disturbance, psychotic disturbance, mood disturbance, or anxiety, unspecified dementia type (H)  Other than complaints of pain and difficulty with therapy, no significant behavioral issues while here. No delirium. She lives with family and has 24 hour supervision.     Type 2 diabetes mellitus with other specified complication, with long-term current use of insulin (H)  Well controlled on new regimen of insulin. She has had 3 BG < 100 over the past week, all at different times of day. Otherwise well controlled with occasional 200s      Discharge Medications:  Current Outpatient Medications   Medication Sig Dispense Refill     acetaminophen (TYLENOL) 500 MG tablet Take 1,000 mg by mouth 3 times daily       aspirin 81 MG EC tablet Take 1 tablet (81 mg) by mouth 2 times daily 60 tablet 0     atorvastatin (LIPITOR) 20 MG tablet Take 20 mg by mouth daily       blood glucose (NO BRAND SPECIFIED) lancets standard Use to test blood sugar 2 times daily or as directed. 100 each 11     blood glucose (NO BRAND SPECIFIED) test strip Use to test blood sugar 2 times daily or as directed. 100 strip 11     blood sugar diagnostic (ONE TOUCH ULTRA TEST) Strp [BLOOD SUGAR DIAGNOSTIC (ONE TOUCH ULTRA TEST) STRP] Use As Directed daily. Test one time.   Blue In Vitro       empagliflozin (JARDIANCE) 10 MG TABS tablet Take 1 tablet (10 mg) by mouth daily       insulin aspart (NOVOLOG PEN) 100 UNIT/ML pen Inject 5 Units Subcutaneous 3 times daily (with meals)       insulin glargine (LANTUS PEN) 100 UNIT/ML pen Inject 13 Units Subcutaneous at bedtime       metFORMIN (GLUCOPHAGE XR) 500 MG 24 hr tablet Take 1 tablet (500 mg) by mouth 2 times daily (with meals)       oxyCODONE (ROXICODONE) 5 MG tablet Take 0.5 tablets (2.5 mg) by mouth every 4 hours as needed for moderate pain 10 tablet 0     polyethylene glycol (MIRALAX) 17 g packet Take 17 g by mouth daily 7 packet 0     senna-docusate (SENOKOT-S/PERICOLACE) 8.6-50 MG tablet Take  "1-2 tablets by mouth 2 times daily Take while on oral narcotics to prevent or treat constipation. (Patient taking differently: Take 1 tablet by mouth 2 times daily Take while on oral narcotics to prevent or treat constipation.) 30 tablet 0        Controlled medications:   OK to send remaining oxycodone     Past Medical History:   Past Medical History:   Diagnosis Date     HTN (hypertension)      Physical Exam:   Vitals: /57   Pulse 89   Temp (!) 96.6  F (35.9  C)   Resp 16   Ht 1.397 m (4' 7\")   Wt 51.8 kg (114 lb 3.2 oz)   SpO2 96%   BMI 26.54 kg/m    BMI: Body mass index is 26.54 kg/m .  GENERAL APPEARANCE:  Alert, in no distress  ENT:  Mouth and posterior oropharynx normal, moist mucous membranes  EYES:  EOM normal, conjunctiva and lids normal  RESP:  no respiratory distress  CV:  no edema  PSYCH:  insight and judgement impaired, memory impaired      SNF labs: Labs done in SNF are in [place] [employer]. Please refer to them using [employer] Everywhere.    DISCHARGE PLAN:  Follow up labs: No labs orders/due  Medical Follow Up:      Follow up with primary care provider in 2-3 weeks  Follow up with ortho as directed  Discharge Services: Home Care:  Occupational Therapy and Physical Therapy, they will start 1/26/24      TOTAL DISCHARGE TIME:   Greater than 30 minutes  Electronically signed by:  LAURA Rose CNP       Documentation of Face to Face and Certification for Home Health Services    I certify that services are/were furnished while this patient was under the care of a physician and that a physician or an allowed non-physician practitioner (NPP), had a face-to-face encounter that meets the physician face-to-face encounter requirements. The encounter was in whole, or in part, related to the primary reason for home health. The patient is confined to his/her home and needs intermittent skilled nursing, physical therapy, speech-language pathology, or the continued need for occupational therapy. A " plan of care has been established by a physician and is periodically reviewed by a physician.  Date of Face-to-Face Encounter: 1/22/2024.    I certify that, based on my findings, the following services are medically necessary home health services: Occupational Therapy and Physical Therapy.    My clinical findings support the need for the above skilled services because: Requires assistance of another person or specialized equipment to access medical services because patient: Is prone to wander/get lost without assistance..    OK for home care services to begin 1/26/24    Patient to re-establish plan of care with their PCP within 7-10 days after leaving the facility to reestablish care.  Medicare certified PECOS provider: LAURA Rose CNP  Date: January 23, 2024                  Sincerely,        LAURA Rose CNP

## 2024-01-22 NOTE — PROGRESS NOTES
[facility] GERIATRICS DISCHARGE SUMMARY  PATIENT'S NAME: Claudia Mckeon  YOB: 1945  MEDICAL RECORD NUMBER:  [id number]  Place of Service where encounter took place:  [facility] (U) [[id number]]    PRIMARY CARE PROVIDER AND CLINIC RESPONSIBLE AFTER TRANSFER:   Stacey Morgan MD, [address] / [place] [address]    [facility] Provider     Transferring providers: LAURA Rose CNP, Melony Rogel MD  Recent Hospitalization/ED:  [facility] stay 12/27/23 to 1/2/24.  Date of SNF Admission: January 02, 2024  Date of SNF (anticipated) Discharge: January 23, 2024  Discharged to: with family       CODE STATUS/ADVANCE DIRECTIVES DISCUSSION:  Full Code   ALLERGIES: Ibuprofen    NURSING FACILITY COURSE   Medication Changes/Rationale:   none    Summary of nursing facility stay:   Claudia Mckeon is a 79 year old  (1945), who is being seen today at: [facility] (U) [[id number]]. Patient admitted to this TCU after [facility] stay 12/27/23 to 1/2/24. PMH uncontrolled DM2, dementia, HTN, and HLD. She presented after a fall with right hip pain, inability to stand. She was found to have an intertrochanteric fracture of right femur. She underwent ORIF on 12/27/23. Recent A1c 15%. She was started on insulin in the hospital.     Closed nondisplaced intertrochanteric fracture of right femur with routine healing  Patient made slow progress with therapy due to c/o pain. Her dementia played a big role in this. She often would not use the oxycodone. She thought that she had pain because her hip wasn't healing well. She is doing better now and is actually asking to go home earlier than planned. Social work has spoken to her family and they are prepared to take her home. They have done caregiver training with therapy. She will have home PT/OT ordered    Moderate dementia without behavioral disturbance, psychotic disturbance, mood disturbance, or anxiety,  unspecified dementia type (H)  Other than complaints of pain and difficulty with therapy, no significant behavioral issues while here. No delirium. She lives with family and has 24 hour supervision.     Type 2 diabetes mellitus with other specified complication, with long-term current use of insulin (H)  Well controlled on new regimen of insulin. She has had 3 BG < 100 over the past week, all at different times of day. Otherwise well controlled with occasional 200s      Discharge Medications:  Current Outpatient Medications   Medication Sig Dispense Refill    acetaminophen (TYLENOL) 500 MG tablet Take 1,000 mg by mouth 3 times daily      aspirin 81 MG EC tablet Take 1 tablet (81 mg) by mouth 2 times daily 60 tablet 0    atorvastatin (LIPITOR) 20 MG tablet Take 20 mg by mouth daily      blood glucose (NO BRAND SPECIFIED) lancets standard Use to test blood sugar 2 times daily or as directed. 100 each 11    blood glucose (NO BRAND SPECIFIED) test strip Use to test blood sugar 2 times daily or as directed. 100 strip 11    blood sugar diagnostic (ONE TOUCH ULTRA TEST) Strp [BLOOD SUGAR DIAGNOSTIC (ONE TOUCH ULTRA TEST) STRP] Use As Directed daily. Test one time.   Blue In Vitro      empagliflozin (JARDIANCE) 10 MG TABS tablet Take 1 tablet (10 mg) by mouth daily      insulin aspart (NOVOLOG PEN) 100 UNIT/ML pen Inject 5 Units Subcutaneous 3 times daily (with meals)      insulin glargine (LANTUS PEN) 100 UNIT/ML pen Inject 13 Units Subcutaneous at bedtime      metFORMIN (GLUCOPHAGE XR) 500 MG 24 hr tablet Take 1 tablet (500 mg) by mouth 2 times daily (with meals)      oxyCODONE (ROXICODONE) 5 MG tablet Take 0.5 tablets (2.5 mg) by mouth every 4 hours as needed for moderate pain 10 tablet 0    polyethylene glycol (MIRALAX) 17 g packet Take 17 g by mouth daily 7 packet 0    senna-docusate (SENOKOT-S/PERICOLACE) 8.6-50 MG tablet Take 1-2 tablets by mouth 2 times daily Take while on oral narcotics to prevent or treat  "constipation. (Patient taking differently: Take 1 tablet by mouth 2 times daily Take while on oral narcotics to prevent or treat constipation.) 30 tablet 0        Controlled medications:   OK to send remaining oxycodone     Past Medical History:   Past Medical History:   Diagnosis Date    HTN (hypertension)      Physical Exam:   Vitals: /57   Pulse 89   Temp (!) 96.6  F (35.9  C)   Resp 16   Ht 1.397 m (4' 7\")   Wt 51.8 kg (114 lb 3.2 oz)   SpO2 96%   BMI 26.54 kg/m    BMI: Body mass index is 26.54 kg/m .  GENERAL APPEARANCE:  Alert, in no distress  ENT:  Mouth and posterior oropharynx normal, moist mucous membranes  EYES:  EOM normal, conjunctiva and lids normal  RESP:  no respiratory distress  CV:  no edema  PSYCH:  insight and judgement impaired, memory impaired      SNF labs: Labs done in SNF are in [place] [employer]. Please refer to them using [employer] Everywhere.    DISCHARGE PLAN:  Follow up labs: No labs orders/due  Medical Follow Up:      Follow up with primary care provider in 2-3 weeks  Follow up with ortho as directed  Discharge Services: Home Care:  Occupational Therapy and Physical Therapy, they will start 1/26/24      TOTAL DISCHARGE TIME:   Greater than 30 minutes  Electronically signed by:  LAURA Rose CNP       Documentation of Face to Face and Certification for Home Health Services    I certify that services are/were furnished while this patient was under the care of a physician and that a physician or an allowed non-physician practitioner (NPP), had a face-to-face encounter that meets the physician face-to-face encounter requirements. The encounter was in whole, or in part, related to the primary reason for home health. The patient is confined to his/her home and needs intermittent skilled nursing, physical therapy, speech-language pathology, or the continued need for occupational therapy. A plan of care has been established by a physician and is periodically reviewed by a " physician.  Date of Face-to-Face Encounter: 1/22/2024.    I certify that, based on my findings, the following services are medically necessary home health services: Occupational Therapy and Physical Therapy.    My clinical findings support the need for the above skilled services because: Requires assistance of another person or specialized equipment to access medical services because patient: Is prone to wander/get lost without assistance..    OK for home care services to begin 1/26/24    Patient to re-establish plan of care with their PCP within 7-10 days after leaving the facility to reestablish care.  Medicare certified PECOS provider: LAURA Rose CNP  Date: January 23, 2024

## 2024-02-02 ENCOUNTER — PATIENT OUTREACH (OUTPATIENT)
Dept: CARE COORDINATION | Facility: CLINIC | Age: 79
End: 2024-02-02

## 2024-02-02 ENCOUNTER — OFFICE VISIT (OUTPATIENT)
Dept: FAMILY MEDICINE | Facility: CLINIC | Age: 79
End: 2024-02-02
Payer: COMMERCIAL

## 2024-02-02 VITALS
SYSTOLIC BLOOD PRESSURE: 119 MMHG | OXYGEN SATURATION: 97 % | DIASTOLIC BLOOD PRESSURE: 80 MMHG | TEMPERATURE: 98.3 F | HEART RATE: 87 BPM

## 2024-02-02 DIAGNOSIS — E11.65 TYPE 2 DIABETES MELLITUS WITH HYPERGLYCEMIA, UNSPECIFIED WHETHER LONG TERM INSULIN USE (H): ICD-10-CM

## 2024-02-02 DIAGNOSIS — S72.001A HIP FRACTURE, RIGHT, CLOSED, INITIAL ENCOUNTER (H): ICD-10-CM

## 2024-02-02 DIAGNOSIS — R54 FRAILTY SYNDROME IN GERIATRIC PATIENT: ICD-10-CM

## 2024-02-02 DIAGNOSIS — R26.89 IMPAIRED GAIT AND MOBILITY: ICD-10-CM

## 2024-02-02 DIAGNOSIS — M81.0 AGE-RELATED OSTEOPOROSIS WITHOUT CURRENT PATHOLOGICAL FRACTURE: ICD-10-CM

## 2024-02-02 DIAGNOSIS — Z09 HOSPITAL DISCHARGE FOLLOW-UP: Primary | ICD-10-CM

## 2024-02-02 DIAGNOSIS — F03.B0 MODERATE DEMENTIA WITHOUT BEHAVIORAL DISTURBANCE, PSYCHOTIC DISTURBANCE, MOOD DISTURBANCE, OR ANXIETY, UNSPECIFIED DEMENTIA TYPE (H): ICD-10-CM

## 2024-02-02 LAB
ERYTHROCYTE [DISTWIDTH] IN BLOOD BY AUTOMATED COUNT: 13.9 % (ref 10–15)
HCT VFR BLD AUTO: 38 % (ref 35–47)
HGB BLD-MCNC: 11.6 G/DL (ref 11.7–15.7)
MCH RBC QN AUTO: 28.6 PG (ref 26.5–33)
MCHC RBC AUTO-ENTMCNC: 30.5 G/DL (ref 31.5–36.5)
MCV RBC AUTO: 94 FL (ref 78–100)
PLATELET # BLD AUTO: 343 10E3/UL (ref 150–450)
RBC # BLD AUTO: 4.05 10E6/UL (ref 3.8–5.2)
WBC # BLD AUTO: 7 10E3/UL (ref 4–11)

## 2024-02-02 PROCEDURE — 99214 OFFICE O/P EST MOD 30 MIN: CPT | Mod: GC

## 2024-02-02 PROCEDURE — 85027 COMPLETE CBC AUTOMATED: CPT

## 2024-02-02 PROCEDURE — 36415 COLL VENOUS BLD VENIPUNCTURE: CPT

## 2024-02-02 PROCEDURE — 80048 BASIC METABOLIC PNL TOTAL CA: CPT

## 2024-02-02 NOTE — PATIENT INSTRUCTIONS
Medications to take every day:      AM:  Tylenol 500mg (1 tablet)  Aspirin 81mg (1 tablet)  Atorvastatin 20mg (1 tablet)  Metformin 1000mg (2 tablets)  Miralax 17g (1 packet)  Senna-docusate (1 tablet)      Noon:  Tylenol 500mg (1 tablet)      PM (before bed):  Lantus (glargine) 13 units  Metformin 1000mg (2 tablets)  Jardiance (10mg)        Use WITH MEALS:  Novolog (aspart) 5 units

## 2024-02-02 NOTE — PROGRESS NOTES
I have personally reviewed the history and examination as documented by Dr. Pitt.  I was present during key portions of the visit and agree with the assessment and plan as documented for 79 yr old female w/ dementia, uncontrolled DM, HTN and recent hospitalization/TCU stay for hip fracture here for hospital follow-up. Needs additional cares at home, wheelchair + walker, dexa scan, order for CGM and diabetes education. Precautions given. Anticipatory guidance given.     Diony Ortega MD  February 2, 2024  11:40 AM

## 2024-02-02 NOTE — PROGRESS NOTES
Clinic Care Coordination Contact  Follow Up Progress Note      Assessment: There was a care coordination referral put in for the pt for PCA services. I called [facility], and refer the pt for a PCA assessment. They informed me that the pt is already on a waiver. The pt is either getting  Services, and PCA services. She stated that I can call the pt Ucare Coordinator to see what services she currently has. The pt Ucare Coordinator is Sandra [phone].     I called Sandra, but got her vm, so I left a vm for her to give me a call back.    I got a call back from the pt Sandra, she stated that the pt has PCA services, Services, and adult day care. She stated that she just did the PCA assessment last week, and will be summit the assessment to the [facility].     I called and talked to the pt daughter, I informed her that the pt is currently getting PCA, , and adult day care services. She stated that can they get more PCA hours since the pt just recently fracture her hip. I told her that last week, the pt Ucare Care Coordinator came out and did a PCA assessment, once she turns that in, then they will let her know how many hours the pt will get. I told her that she has to wait.     Care Gaps:    Health Maintenance Due   Topic Date Due    DEXA  Never done    ADVANCE CARE PLANNING  Never done    EYE EXAM  Never done    RSV VACCINE (Pregnancy & 60+) (1 - 1-dose 60+ series) Never done    MEDICARE ANNUAL WELLNESS VISIT  Never done    ZOSTER IMMUNIZATION (2 of 3) 03/31/2008    COVID-19 Vaccine (4 - 2023-24 season) 09/01/2023           Care Plans      Intervention/Education provided during outreach:               Plan:     Care Coordinator will follow up in

## 2024-02-02 NOTE — PROGRESS NOTES
Assessment & Plan     (Z09) Hospital discharge follow-up  (primary encounter diagnosis)  (S72.001A) Hip fracture, right, closed, initial encounter (H)  Comment: Improving since TCU and hospital discharge. Will need skilled nursing and home PT/OT going forward given risk for worsening immobility. Recommended to patient and family that she should continue to attempt to ambulate, as this will both improve her health and quality of life. However, it is also reasonable to temporarily use a wheelchair given her dementia and frailty during transport if possible. Will place and order for that as well as for a four wheel walker to help her regain her strength and hopefully encourage ambulation. DEXA as below. Plan for follow up in 2 weeks.  Plan: Dexa hip/pelvis/spine*, Walker Order for DME -         ONLY FOR DME, Wheelchair Order for DME - ONLY         FOR DME, Primary Care - Care Coordination         Referral, Home Care Referral          (M81.0) Age-related osteoporosis without current pathological fracture  Comment: Given age and fracture, likely that patient has age related osteoporosis. Would be beneficial for the patient to undergo DEXA as she is a high risk for falls. DEXA ordered, plan for follow up per results.  Plan: Dexa hip/pelvis/spine*          (E11.65) Type 2 diabetes mellitus with hyperglycemia, unspecified whether long term insulin use (H)  Comment: Appears to have been well-controlled at the TCU but based on family's reports since discharge, likely that the patient is not optimized in her control at this time. Given reports of continued hyperglycemia, can send for a CGM to help track BG for the time being going forward. Plan to refer to diabetes education to help educate on starting CGM. Orders placed. Will redraw labs today to check renal function and glucose levels as well. Home care to help manage medications.  Plan: Basic metabolic panel, CBC with platelets,         insulin aspart (NOVOLOG PEN) 100  "UNIT/ML pen,         insulin glargine (LANTUS PEN) 100 UNIT/ML pen,         Continuous Blood Gluc  (FREESTYLE LEXI        2 READER) RAMIREZ, Continuous Blood Gluc Sensor         (FREESTYLE LEXI 2 SENSOR) MISC, Primary Care -        Care Coordination Referral, Adult Diabetes         Education [facility] Referral, Home Care         Referral          (R54) Frailty syndrome in geriatric patient  Comment: Given her hip fracture, likely she is quite frail. Requiring 24 hour cares by a family member, possibly could qualify for PCA hours at this time. Will refer to our SW to see how many hours our patient is getting and see if her family would qualify for more given they provide round the clock cares. Daily medication list provided for patient. Plan to follow up in 3 months for a wellness visit.  Plan: Primary Care - Care Coordination Referral, Home        Care Referral          (R26.89) Impaired gait and mobility  Comment: As above, secondary to her hip fracture and frail status. Plans as above for PCA service, PT/OT, and nursing.  Plan: Primary Care - Care Coordination Referral, Home        Care Referral          (F03.B0) Moderate dementia without behavioral disturbance, psychotic disturbance, mood disturbance, or anxiety, unspecified dementia type (H)  Comment: Contributing to her ADLs and impairing her recovery from her hip fracture. Suspect her etiology waxes and wanes with time but has never been formally worked up. Will need assistance going forward regardless as she does continue to have memory problems and a future fall may complicate her overall health and wellness. Plan to follow up within 3 months for a wellness exam and possible referral.  Plan: Primary Care - Care Coordination Referral, Home        Care Referral           BMI  Estimated body mass index is 26.54 kg/m  as calculated from the following:    Height as of 1/22/24: 1.397 m (4' 7\").    Weight as of 1/22/24: 51.8 kg (114 lb 3.2 oz).   Weight " management plan: Patient was referred to their PCP to discuss a diet and exercise plan.        Return in about 2 weeks (around 2/16/2024) for Follow up, with me, in person.    Rancho Taylor is a 79 year old, presenting for the following health issues:  RECHECK (Would like to request a wheelchair)    HPI       Hospital Follow-up Visit:    Hospital/Nursing Home/IP Rehab Facility: [facility]  Date of Admission: 12/27/2023  Date of Discharge: 1/2/2024  Reason(s) for Admission: fall, acute R hip fracture, subsequently sent to [facility] TCU and discharged 1/22/2024    Was your hospitalization related to COVID-19? No   Problems taking medications regularly:  None  Medication changes since discharge: None  Problems adhering to non-medication therapy:  None    Summary of hospitalization:  [facility] discharge summary reviewed  Diagnostic Tests/Treatments reviewed.  Follow up needed:   Other Healthcare Providers Involved in Patient s Care:         Physical Therapy, Occupational therapy  Update since discharge: improved.      Per review of TCU discharge note 1/22/2024:  Closed nondisplaced intertrochanteric fracture of right femur with routine healing  Patient made slow progress with therapy due to c/o pain. Her dementia played a big role in this. She often would not use the oxycodone. She thought that she had pain because her hip wasn't healing well. She is doing better now and is actually asking to go home earlier than planned. Social work has spoken to her family and they are prepared to take her home. They have done caregiver training with therapy. She will have home PT/OT ordered      Of note, patient has noted T2DM:  A1c in the hospital 15%  Current meds:  Diabetes Medication(s)       Biguanides       metFORMIN (GLUCOPHAGE XR) 500 MG 24 hr tablet Take 1 tablet (500 mg) by mouth 2 times daily (with meals)       Insulin       insulin aspart (NOVOLOG PEN) 100 UNIT/ML  pen Inject 5 Units Subcutaneous 3 times daily (with meals)     insulin glargine (LANTUS PEN) 100 UNIT/ML pen Inject 13 Units Subcutaneous at bedtime       Sodium-Glucose Co-Transporter 2 (SGLT2) Inhibitors       empagliflozin (JARDIANCE) 10 MG TABS tablet Take 1 tablet (10 mg) by mouth daily        Today's BG is 153  Measuring BG with meter and measuring 2 times per day, nightly BG in the 200's  Compliant with medications  Would benefit from using a CGM given unclear numbers at mealtimes, may actually benefit from sliding scale adjustments      Noted anemia during hospital stay, will repeat CBC today      Plan of care communicated with patient and family            Review of Systems  Constitutional, neuro, ENT, endocrine, pulmonary, cardiac, gastrointestinal, genitourinary, musculoskeletal, integument and psychiatric systems are negative, except as otherwise noted.      Objective    /80   Pulse 87   Temp 98.3  F (36.8  C)   SpO2 97%   There is no height or weight on file to calculate BMI.    Physical Exam   General: Alert and oriented x 2, no acute distress. Pleasantly demented.  Skin: clean, dry, and intact  HEENT: normocephalic, atraumatic, PERRL, EOMI, no conjunctival injection or icterus, ears and nose normal, no LAD or masses  Resp: clear to ausculation bilaterally, no rales or wheezes  Cardio: RRR, S1 and S2 present  Abdomen: soft, nontender, nondistended, bowel sounds present x 4, no masses  Extremities: R hip with well-healing hip incisions, mildly tender to palpation. No areas of erythema, edema, or excoriations. No exudates present. Able to bear weight on the affected leg, though unable to ambulate without significant pain and discomfort and unable to ambulate more than 15-20 feet without needing to stop due to pain  Psych: calm, pleasantly demented              Signed Electronically by: Vanita Pitt MD  Precepted patient with Dr. Diony Ortega.

## 2024-02-03 LAB
ANION GAP SERPL CALCULATED.3IONS-SCNC: 11 MMOL/L (ref 7–15)
BUN SERPL-MCNC: 9.2 MG/DL (ref 8–23)
CALCIUM SERPL-MCNC: 9.4 MG/DL (ref 8.8–10.2)
CHLORIDE SERPL-SCNC: 103 MMOL/L (ref 98–107)
CREAT SERPL-MCNC: 0.67 MG/DL (ref 0.51–0.95)
DEPRECATED HCO3 PLAS-SCNC: 24 MMOL/L (ref 22–29)
EGFRCR SERPLBLD CKD-EPI 2021: 87 ML/MIN/1.73M2
GLUCOSE SERPL-MCNC: 116 MG/DL (ref 70–99)
POTASSIUM SERPL-SCNC: 3.9 MMOL/L (ref 3.4–5.3)
SODIUM SERPL-SCNC: 138 MMOL/L (ref 135–145)

## 2024-02-16 ENCOUNTER — OFFICE VISIT (OUTPATIENT)
Dept: FAMILY MEDICINE | Facility: CLINIC | Age: 79
End: 2024-02-16
Payer: COMMERCIAL

## 2024-02-16 VITALS
SYSTOLIC BLOOD PRESSURE: 137 MMHG | TEMPERATURE: 97.7 F | DIASTOLIC BLOOD PRESSURE: 76 MMHG | BODY MASS INDEX: 24.59 KG/M2 | HEART RATE: 104 BPM | RESPIRATION RATE: 16 BRPM | OXYGEN SATURATION: 96 % | WEIGHT: 114 LBS | HEIGHT: 57 IN

## 2024-02-16 DIAGNOSIS — S72.001D HIP FRACTURE, RIGHT, CLOSED, WITH ROUTINE HEALING, SUBSEQUENT ENCOUNTER: ICD-10-CM

## 2024-02-16 DIAGNOSIS — E11.65 TYPE 2 DIABETES MELLITUS WITH HYPERGLYCEMIA, UNSPECIFIED WHETHER LONG TERM INSULIN USE (H): Primary | ICD-10-CM

## 2024-02-16 PROCEDURE — 99214 OFFICE O/P EST MOD 30 MIN: CPT | Mod: GC

## 2024-02-16 RX ORDER — METFORMIN HCL 500 MG
1000 TABLET, EXTENDED RELEASE 24 HR ORAL 2 TIMES DAILY WITH MEALS
Qty: 180 TABLET | Refills: 3 | Status: SHIPPED | OUTPATIENT
Start: 2024-02-16 | End: 2024-03-04

## 2024-02-16 NOTE — PROGRESS NOTES
Assessment & Plan     (E11.65) Type 2 diabetes mellitus with hyperglycemia, unspecified whether long term insulin use (H)  (primary encounter diagnosis)  Comment: Doing better on BG control and from a symptom standpoint. Still high post-prandial levels, so will uptitrate Novolog to 7 units with meals. Will also increase metformin to 1000mg BID rather than 500mg BID, will need to continue monitoring BG. Plan for follow-up in 1 month.  Plan: metFORMIN (GLUCOPHAGE XR) 500 MG 24 hr tablet,         insulin aspart (NOVOLOG PEN) 100 UNIT/ML pen          (S72.001D) Hip fracture, right, closed, with routine healing, subsequent encounter  Comment: Healing well. Will need to discuss with our care coordination team on if we can get an in-person  for visits given her cognitive disability as well as her inability to appropriately use technology. Plan for follow-up as needed.  Plan: Primary Care - Care Coordination Referral              Return in about 4 weeks (around 3/15/2024) for Follow up, with any available provider, in person.    [place]   Ree is a 79 year old, presenting for the following health issues:  RECHECK (Follow up on legs)      HPI     Here for follow up on hip fracture and T2DM    Per my last note on 2/2/2024:  (Z09) Hospital discharge follow-up  (primary encounter diagnosis)  (S72.001A) Hip fracture, right, closed, initial encounter (H)  Comment: Improving since TCU and hospital discharge. Will need skilled nursing and home PT/OT going forward given risk for worsening immobility. Recommended to patient and family that she should continue to attempt to ambulate, as this will both improve her health and quality of life. However, it is also reasonable to temporarily use a wheelchair given her dementia and frailty during transport if possible. Will place and order for that as well as for a four wheel walker to help her regain her strength and hopefully encourage ambulation. DEXA as below. Plan for  follow up in 2 weeks.  Today, pain is improved but still hurting  Participating in skilled nursing, PT, OT so far for rehab, improved and tolerating well  Requesting an in-person  for these visits  Currently using four wheel walker rented out, going for an appt for DME walker and wheelchair previously ordered  Overall her strength has improved  Well-managed pain      T2DM:  Per my last note on 2/2/2024:  (E11.65) Type 2 diabetes mellitus with hyperglycemia, unspecified whether long term insulin use (H)  Comment: Appears to have been well-controlled at the TCU but based on family's reports since discharge, likely that the patient is not optimized in her control at this time. Given reports of continued hyperglycemia, can send for a CGM to help track BG for the time being going forward. Plan to refer to diabetes education to help educate on starting CGM. Orders placed. Will redraw labs today to check renal function and glucose levels as well. Home care to help manage medications.    Last A1c:  Lab Results   Component Value Date    A1C >15.0 12/19/2023    A1C 6.7 12/02/2014    A1C 6.9 09/02/2014    A1C 7.2 05/05/2014    A1C 7.4 01/16/2014     Current meds:  Diabetes Medication(s)       Biguanides       metFORMIN (GLUCOPHAGE XR) 500 MG 24 hr tablet Take 2 tablets (1,000 mg) by mouth 2 times daily (with meals)       Insulin       insulin aspart (NOVOLOG PEN) 100 UNIT/ML pen Inject 7 Units Subcutaneous 3 times daily (with meals)     insulin glargine (LANTUS PEN) 100 UNIT/ML pen Inject 13 Units Subcutaneous at bedtime       Sodium-Glucose Co-Transporter 2 (SGLT2) Inhibitors       empagliflozin (JARDIANCE) 10 MG TABS tablet Take 1 tablet (10 mg) by mouth daily        Was able to  CGM but they don't know how to use it  Has checking BG with meter at home in the meantime, BG this morning was 137 with variance from 103 to 200, averaging more often in the morning during checks  Post prandials up to 300 or  "more  Compliant with medications (insulin 5 units with meals, 13 at bedtime)  Overall improved symptoms, neuropathy tolerable      Review of Systems  Constitutional, neuro, ENT, endocrine, pulmonary, cardiac, gastrointestinal, genitourinary, musculoskeletal, integument and psychiatric systems are negative, except as otherwise noted.      Objective    /76   Pulse 104   Temp 97.7  F (36.5  C) (Oral)   Resp 16   Ht 1.448 m (4' 9\")   Wt 51.7 kg (114 lb)   SpO2 96%   BMI 24.67 kg/m    Body mass index is 24.67 kg/m .    Physical Exam   General: Alert and oriented x 2, no acute distress. Pleasant.  Skin: clean, dry, and intact  HEENT: normocephalic, atraumatic, PERRL, EOMI, no conjunctival injection or icterus, ears and nose normal, no LAD or masses  Resp: clear to ausculation bilaterally, no rales or wheezes  Cardio: RRR, S1 and S2 present  Abdomen: soft, nontender, nondistended, bowel sounds present x 4, no masses  Extremities: no erythema or edema noted. Ambulating with a walker. R hip appears better healed, ambulation improved since 2 weeks ago but still weak. Nontender to palpation, scars appear well healing.  Psych: calm, conversational              Signed Electronically by: Vanita Pitt MD  Precepted patient with Dr. Karina Arce.    "

## 2024-02-16 NOTE — PROGRESS NOTES
Preceptor Attestation:  Patient's case reviewed and discussed with the resident, Vanita Pitt MD, and I personally evaluated the patient. I agree with written assessment and plan of care.    Supervising Physician:  Karina Arce MD   [facility]

## 2024-02-21 ENCOUNTER — PATIENT OUTREACH (OUTPATIENT)
Dept: CARE COORDINATION | Facility: CLINIC | Age: 79
End: 2024-02-21
Payer: COMMERCIAL

## 2024-02-21 NOTE — PROGRESS NOTES
Clinic Care Coordination Contact  Follow Up Progress Note      Assessment: There was a care coordination referral put in for the pt for  for physical therapy. I called and talked to the pt daughter, I told her that they should provide a  for them, when they come. I told her that her medical insurance will pay for the . I told her to have her kids call, the physical place, and request an  in person. She stated that she will ask the next time,and see.     Care Gaps:    Health Maintenance Due   Topic Date Due    DEXA  Never done    ADVANCE CARE PLANNING  Never done    EYE EXAM  Never done    RSV VACCINE (Pregnancy & 60+) (1 - 1-dose 60+ series) Never done    MEDICARE ANNUAL WELLNESS VISIT  Never done    ZOSTER IMMUNIZATION (2 of 3) 03/31/2008    COVID-19 Vaccine (4 - 2023-24 season) 09/01/2023           Care Plans      Intervention/Education provided during outreach:               Plan:     Care Coordinator will follow up in

## 2024-03-04 ENCOUNTER — OFFICE VISIT (OUTPATIENT)
Dept: PHARMACY | Facility: CLINIC | Age: 79
End: 2024-03-04
Payer: COMMERCIAL

## 2024-03-04 ENCOUNTER — OFFICE VISIT (OUTPATIENT)
Dept: FAMILY MEDICINE | Facility: CLINIC | Age: 79
End: 2024-03-04
Payer: COMMERCIAL

## 2024-03-04 VITALS
DIASTOLIC BLOOD PRESSURE: 82 MMHG | RESPIRATION RATE: 24 BRPM | SYSTOLIC BLOOD PRESSURE: 153 MMHG | TEMPERATURE: 97 F | BODY MASS INDEX: 24.89 KG/M2 | HEART RATE: 105 BPM | OXYGEN SATURATION: 95 % | WEIGHT: 115 LBS

## 2024-03-04 DIAGNOSIS — S72.001D HIP FRACTURE, RIGHT, CLOSED, WITH ROUTINE HEALING, SUBSEQUENT ENCOUNTER: ICD-10-CM

## 2024-03-04 DIAGNOSIS — E11.65 TYPE 2 DIABETES MELLITUS WITH HYPERGLYCEMIA, UNSPECIFIED WHETHER LONG TERM INSULIN USE (H): Primary | ICD-10-CM

## 2024-03-04 DIAGNOSIS — E11.65 TYPE 2 DIABETES MELLITUS WITH HYPERGLYCEMIA, UNSPECIFIED WHETHER LONG TERM INSULIN USE (H): ICD-10-CM

## 2024-03-04 DIAGNOSIS — E11.69 TYPE 2 DIABETES MELLITUS WITH OTHER SPECIFIED COMPLICATION, WITH LONG-TERM CURRENT USE OF INSULIN (H): Primary | ICD-10-CM

## 2024-03-04 DIAGNOSIS — Z79.4 TYPE 2 DIABETES MELLITUS WITH OTHER SPECIFIED COMPLICATION, WITH LONG-TERM CURRENT USE OF INSULIN (H): Primary | ICD-10-CM

## 2024-03-04 DIAGNOSIS — R30.0 DYSURIA: ICD-10-CM

## 2024-03-04 DIAGNOSIS — S72.001A HIP FRACTURE, RIGHT, CLOSED, INITIAL ENCOUNTER (H): ICD-10-CM

## 2024-03-04 DIAGNOSIS — R26.89 IMPAIRED GAIT AND MOBILITY: ICD-10-CM

## 2024-03-04 DIAGNOSIS — R54 FRAILTY SYNDROME IN GERIATRIC PATIENT: ICD-10-CM

## 2024-03-04 DIAGNOSIS — K21.00 GASTROESOPHAGEAL REFLUX DISEASE WITH ESOPHAGITIS WITHOUT HEMORRHAGE: ICD-10-CM

## 2024-03-04 DIAGNOSIS — E55.9 VITAMIN D DEFICIENCY: ICD-10-CM

## 2024-03-04 LAB
ALBUMIN UR-MCNC: NEGATIVE MG/DL
APPEARANCE UR: CLEAR
BACTERIA #/AREA URNS HPF: ABNORMAL /HPF
BILIRUB UR QL STRIP: NEGATIVE
COLOR UR AUTO: YELLOW
GLUCOSE UR STRIP-MCNC: >=1000 MG/DL
HGB UR QL STRIP: NEGATIVE
KETONES UR STRIP-MCNC: NEGATIVE MG/DL
LEUKOCYTE ESTERASE UR QL STRIP: ABNORMAL
NITRATE UR QL: NEGATIVE
PH UR STRIP: 5.5 [PH] (ref 5–7)
RBC #/AREA URNS AUTO: ABNORMAL /HPF
SP GR UR STRIP: 1.01 (ref 1–1.03)
UROBILINOGEN UR STRIP-ACNC: 0.2 E.U./DL
WBC #/AREA URNS AUTO: ABNORMAL /HPF

## 2024-03-04 PROCEDURE — 87086 URINE CULTURE/COLONY COUNT: CPT

## 2024-03-04 PROCEDURE — 81001 URINALYSIS AUTO W/SCOPE: CPT

## 2024-03-04 PROCEDURE — 99605 MTMS BY PHARM NP 15 MIN: CPT | Performed by: PHARMACIST

## 2024-03-04 PROCEDURE — 99214 OFFICE O/P EST MOD 30 MIN: CPT | Mod: GC

## 2024-03-04 PROCEDURE — 99607 MTMS BY PHARM ADDL 15 MIN: CPT | Performed by: PHARMACIST

## 2024-03-04 RX ORDER — AMOXICILLIN 250 MG
1 CAPSULE ORAL DAILY
Qty: 90 TABLET | Refills: 1 | Status: SHIPPED | OUTPATIENT
Start: 2024-03-04 | End: 2024-03-20

## 2024-03-04 RX ORDER — METFORMIN HCL 500 MG
500 TABLET, EXTENDED RELEASE 24 HR ORAL EVERY MORNING
Qty: 90 TABLET | Refills: 3 | Status: SHIPPED | OUTPATIENT
Start: 2024-03-04 | End: 2024-05-29

## 2024-03-04 RX ORDER — ISOPROPYL ALCOHOL 700 MG/ML
1 CLOTH TOPICAL DAILY
Qty: 100 EACH | Refills: 3 | Status: CANCELLED | OUTPATIENT
Start: 2024-03-04

## 2024-03-04 RX ORDER — RESPIRATORY SYNCYTIAL VIRUS VACCINE 120MCG/0.5
0.5 KIT INTRAMUSCULAR ONCE
Qty: 1 EACH | Refills: 0 | Status: CANCELLED | OUTPATIENT
Start: 2024-03-04 | End: 2024-03-04

## 2024-03-04 RX ORDER — ATORVASTATIN CALCIUM 20 MG/1
20 TABLET, FILM COATED ORAL DAILY
Qty: 90 TABLET | Refills: 3 | Status: SHIPPED | OUTPATIENT
Start: 2024-03-04

## 2024-03-04 RX ORDER — ISOPROPYL ALCOHOL 700 MG/ML
1 CLOTH TOPICAL DAILY
Qty: 100 EACH | Refills: 3 | Status: SHIPPED | OUTPATIENT
Start: 2024-03-04

## 2024-03-04 NOTE — LETTER
"Recommended To-Do List      Prepared on: 03/04/2024       You can get the best results from your medications by completing the items on this \"To-Do List.\"      Bring your To-Do List when you go to your doctor. And, share it with your family or caregivers.    My To-Do List:  What we talked about: What I should do:   The importance of taking your medication as intended    Education: start Senna-Docusate once daily           What we talked about: What I should do:   The importance of taking your medication as intended    Education: start using Freestyle Luisito 2           What we talked about: What I should do:   An issue with your medication    Stop taking empagliflozin (JARDIANCE)          What we talked about: What I should do:   The importance of taking your medication as intended    Education: start insulin glargine once per day           What we talked about: What I should do:   The importance of taking your medication as intended    Decrease your dosage of metFORMIN (GLUCOPHAGE XR)          What we talked about: What I should do:                     "

## 2024-03-04 NOTE — LETTER
_  Medication List        Prepared on: 03/04/2024     Bring your Medication List when you go to the doctor, hospital, or   emergency room. And, share it with your family or caregivers.     Note any changes to how you take your medications.  Cross out medications when you no longer use them.    Medication How I take it Why I use it Prescriber   acetaminophen (TYLENOL) 500 MG tablet Take 1,000 mg by mouth 3 times daily Hip fracture, right, closed, initial encounter (H) Patient Reported   atorvastatin (LIPITOR) 20 MG tablet Take 1 tablet (20 mg) by mouth daily Type 2 diabetes mellitus with other specified complication, with long-term current use of insulin (H) Sukhdeep Salazar MD   blood glucose (NO BRAND SPECIFIED) lancets standard Use to test blood sugar 2 times daily or as directed. 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 Type II Diabetes Mellitus with Peripheral Circulatory Disorder (H) Leanne Barillas MD   blood sugar diagnostic (ONE TOUCH ULTRA TEST) Strp [BLOOD SUGAR DIAGNOSTIC (ONE TOUCH ULTRA TEST) STRP] Use As Directed daily. Test one time.   Blue In Vitro Type 2 diabetes mellitus with hyperglycemia, unspecified whether long term insulin use (H) Dianne Mcgrath MD   Continuous Blood Gluc Sensor (FREESTYLE LEXI 2 SENSOR) MISC Place 1 each onto the skin every 14 days Use 1 sensor every 14 days. Use to read blood sugars per 's instructions. Type 2 diabetes mellitus with hyperglycemia, unspecified whether long term insulin use (H) Diony Ortega MD   insulin aspart (NOVOLOG PEN) 100 UNIT/ML pen Inject 7 Units Subcutaneous 3 times daily (with meals) Type 2 diabetes mellitus with hyperglycemia, unspecified whether long term insulin use (H) Karina Arce MD   insulin glargine (LANTUS PEN) 100 UNIT/ML pen Inject 13 Units Subcutaneous at bedtime  Type 2 diabetes mellitus with hyperglycemia, unspecified whether long term insulin use (H) Diony Ortega MD   insulin pen needle (32G X 4 MM) 32G X 4 MM miscellaneous Use 1 pen needles daily Type 2 diabetes mellitus with other specified complication, with long-term current use of insulin (H) Sukhdeep Salazar MD   Isopropyl Alcohol (ALCOHOL WIPES) 70 % MISC Externally apply 1 each topically daily Type 2 diabetes mellitus with other specified complication, with long-term current use of insulin (H) Sukhdeep Salazar MD   metFORMIN (GLUCOPHAGE XR) 500 MG 24 hr tablet Take 1 tablet (500 mg) by mouth every morning Type 2 diabetes mellitus with hyperglycemia, unspecified whether long term insulin use (H) Sukhdeep Salazar MD   senna-docusate (SENOKOT-S/PERICOLACE) 8.6-50 MG tablet Take 1 tablet by mouth daily Hip fracture, right, closed, initial encounter (H) Sukhdeep Salazar MD         Add new medications, over-the-counter drugs, herbals, vitamins, or  minerals in the blank rows below.    Medication How I take it Why I use it Prescriber                                      Allergies:      ibuprofen        Side effects I have had:               Other Information:              My notes and questions:

## 2024-03-04 NOTE — LETTER
March 4, 2024  Ree May  [address]  [place] [address]    Dear [facility]     Thank you for talking with me on Mar 4, 2024 about your health and medications. As a follow-up to our conversation, I have included two documents:      Your Recommended To-Do List has steps you should take to get the best results from your medications.  Your Medication List will help you keep track of your medications and how to take them.    If you want to talk about these documents, please call Lelo Weathers RPH at phone: [phone], Monday-Friday 8-4:30pm.    I look forward to working with you and your doctors to make sure your medications work well for you.    Sincerely,  Lelo Weathers RPH  [facility] Pharmacist, [facility]

## 2024-03-04 NOTE — PROGRESS NOTES
Assessment & Plan     (E11.65) Type 2 diabetes mellitus with hyperglycemia, unspecified whether long term insulin use (H)  (primary encounter diagnosis)  Comment: Was given small insulin syringes rather than insulin pen, appreciate MTM's counseling and teaching on this today. Plan to only move forward with 7 units Lantus for now to start, as this is what she was tolerating previously. Will stop ASA, titrated down to 1 metformin a day, stop jardiance due to increased urinary symptoms. Should continue monitoring with CGM. Plan for follow-up in 2 weeks.  Plan: follow-up 2 weeks    (R30.0) Dysuria  Comment: Likely secondary to hyperglycemia and Jardiance, though with discomfort and burning will need a UA to rule out infection. Plan for UA, treat accordingly.  Plan: UA Macroscopic with reflex to Microscopic and         Culture          (S72.001D) Hip fracture, right, closed, with routine healing, subsequent encounter  (R26.89) Impaired gait and mobility  (R54) Frailty syndrome in geriatric patient  Comment: Needs wheelchair. Can't propel on their own given frailty but has family who takes care of her and is able to propel her. Does fit throughout the house per skilled nursing assessment. Will assist with ADLs given her limited mobility. Unable to ambulate long distances without wheelchair assistance. However, has trialed FWW prior but still only able to ambulate 20-30 feet at a time, not able to do things such as shopping or going out during the day on her own without self propelled assistance. Continuing with PT, plan to monitor.  Plan: Wheelchair Order for DME - ONLY FOR DME              Return in about 2 weeks (around 3/18/2024) for Follow up, in person.      Subjective   Ree is a 79 year old, presenting for the following health issues:  Follow Up    HPI     Here for DM follow-up  Per my last note 2/16/2024:  (E11.65) Type 2 diabetes mellitus with hyperglycemia, unspecified whether long term insulin use (H)   (primary encounter diagnosis)  Comment: Doing better on BG control and from a symptom standpoint. Still high post-prandial levels, so will uptitrate Novolog to 7 units with meals. Will also increase metformin to 1000mg BID rather than 500mg BID, will need to continue monitoring BG. Plan for follow-up in 1 month.  Plan: metFORMIN (GLUCOPHAGE XR) 500 MG 24 hr tablet,         insulin aspart (NOVOLOG PEN) 100 UNIT/ML pen  Current medications include  Diabetes Medication(s)       Biguanides       metFORMIN (GLUCOPHAGE XR) 500 MG 24 hr tablet Take 1 tablet (500 mg) by mouth every morning       Insulin       insulin aspart (NOVOLOG PEN) 100 UNIT/ML pen Inject 7 Units Subcutaneous 3 times daily (with meals)     insulin glargine (LANTUS PEN) 100 UNIT/ML pen Inject 13 Units Subcutaneous at bedtime        Last A1c  Lab Results   Component Value Date    A1C >15.0 12/19/2023    A1C 6.7 12/02/2014    A1C 6.9 09/02/2014    A1C 7.2 05/05/2014    A1C 7.4 01/16/2014   Supposed to have education on BG checks today using CGM, appreciate MTM assistance with this  Appears that she has not yet been using her insulin as prescribed  Has tons of medications from her TCU  Will titrate    Is having urinary symptoms including burning and increased frequency but could be related to Jardiance so will downtitrate  Has gotten antibiotics in the past with improvement    Wheelchair order at last visit not et ordered  Will reorder with further documentation today      Review of Systems  Constitutional, neuro, ENT, endocrine, pulmonary, cardiac, gastrointestinal, genitourinary, musculoskeletal, integument and psychiatric systems are negative, except as otherwise noted.      Objective    BP (!) 153/82   Pulse 105   Temp 97  F (36.1  C)   Resp 24   Wt 52.2 kg (115 lb)   SpO2 95%   BMI 24.89 kg/m    Body mass index is 24.89 kg/m .  Physical Exam   General: Alert and oriented x 3, no acute distress. Frail.  Skin: clean, dry, and intact  HEENT:  normocephalic, atraumatic, PERRL, EOMI, no conjunctival injection or icterus, ears and nose normal, no LAD or masses  Resp: clear to ausculation bilaterally, no rales or wheezes  Cardio: RRR, S1 and S2 present, no S3 or S4, no rubs or murmurs  Abdomen: soft, nontender, nondistended, bowel sounds present x 4, no masses, no hepatosplenomegaly  Extremities: no erythema or edema. In wheelchair, hip appears to be improved.  Psych: normal mood, normal mentation. Mild dementia and irritability.              Signed Electronically by: Vanita Pitt MD  Precepted patient with Dr. Sukhdeep Salazar.

## 2024-03-04 NOTE — PROGRESS NOTES
"Medication Therapy Management (MTM) Encounter    ASSESSMENT:                            Medication Adherence/Access: Support from family, however needing education and ongoing support.     Type 2 diabetes mellitus with other specified complication, with long-term current use of insulin (H)  A1c above goal <8%, higher 2/2 age. Doesn't have supplies to administer insulin. Concern for urinary frequency 2/2 SGLT2. No history of elevated UACR. Will discontinue for now, can restart in future once severe hyperglycemia improved. Given age and limited self-care abilities, ideally would add GLP1 over meal time insulin, however TBD based on basal insulin response.     Vitamin D Deficiency  Hip fracture, right, closed, initial encounter (H)  Historically low vitamin D, however last labs from 2014. Need repeat values. Especially given likely osteoporosis (no DXA yet), if pursue osteoporosis treatment need to correct low vitamin D in addition to address calcium supplementation.     Gastroesophageal reflux disease with esophagitis without hemorrhage  Not addressed in great detail today, needing follow up to see if medicine changes today changed \"burning\" sensation.     PLAN:                            New pillbox regimen, pt agreeable to: Metformin  mg daily, Atorvastatin 20 mg/day, Senna-Docusate 1 tablet daily. Pt to take in the afternoon.     Insulin instruction provided. Start only insulin glargine 10 units daily. HOLD insulin aspart for now. Patient to take in the evening before bed. Pen needles sent to pharmacy    Start CGM. Education provided. Sensor placed today with daughter. Video reviewed: https://www.youtube.com/watch?v=qzM_sFnwgEc     Medications Discontinued During This Encounter   Medication Reason    aspirin 81 MG EC tablet Therapy completed (No AVS)    empagliflozin (JARDIANCE) 10 MG TABS tablet Side effects    metFORMIN (GLUCOPHAGE XR) 500 MG 24 hr tablet     polyethylene glycol (MIRALAX) 17 g packet " Stopped by Patient (No AVS)    senna-docusate (SENOKOT-S/PERICOLACE) 8.6-50 MG tablet     atorvastatin (LIPITOR) 20 MG tablet Reorder (No AVS)    oxyCODONE (ROXICODONE) 5 MG tablet Therapy completed (No AVS)   ^^ Instructed daughter to dispose of Oxycodone      Follow-up: Return in about 2 weeks (around 3/18/2024).    Medication issues to be addressed at a future visit     Follow up DXA, repeat Vitamin D level, start Calcium supplement? Or at least assess Ca intake in diet  Once basal insulin titrated and fasting blood glucose near goal <150, if post-prandial blood glucose elevated, start Victoza or weekly GLP1 over Novolog  ?Titrate Metformin    SUBJECTIVE/OBJECTIVE:                          Claudia Mckeon is a 79 year old female coming in for an initial visit. She was referred to me from Dr. Pitt. Patient was accompanied by daughter. Leanne , via telephone used for visit.     Reason for visit: comprehensive medicine review  .    Allergies/ADRs: Reviewed in chart  Past Medical History: Reviewed in chart  Social History     Tobacco Use    Smoking status: Never     Passive exposure: Never    Smokeless tobacco: Never     ^Reviewed today    Medication Adherence/Access: Since leaving transitional care unit, daughter has been caring for patient. Sometimes is able to get her to take medicines, other times pt refuses. Denies concerns swallowing tablets. Brings pillbox today, filled Metformin 500 mg twice daily, jardiance 10 mg/day, Aspirin 81 mg twice daily. Has several bottles of pills and left over bubble packs from transitional care unit. Daughter requesting education on how to manage medicines.    Type 2 diabetes mellitus with other specified complication, with long-term current use of insulin (H)  Brings insulin glargine and insulin aspart, not currently using either. Brings Luisito 2 supplies today, needs help putting on. No blood glucose values available. Concern for urinary accidents per daughters report. Has  "insulin pens but no pen needles, only U100 insulin syringes. Has bottle of Atorvastatin, unclear if taking. Denies history of stroke or heart attack.     Vitamin D Deficiency  Not currently treated.     Hip fracture, right, closed, initial encounter (H)  Minor pain remains. Has Oxycodone and APAP. Pt with concern for adverse drug reaction \"burning\" which she is unsure which medicine caused but may be Oxycodone.     Gastroesophageal reflux disease with esophagitis without hemorrhage  Not currently treated. Unclear if \"burning\" 2/2 adverse drug reaction from medication or untreated GERD.     Concerns for constipation per daughters report. Pt reports 1 BM per week. Not currently using Senna-Docusate.      Today's Vitals:   BP Readings from Last 1 Encounters:   03/04/24 (!) 153/82     Pulse Readings from Last 1 Encounters:   03/04/24 105     Wt Readings from Last 1 Encounters:   03/04/24 115 lb (52.2 kg)     Ht Readings from Last 1 Encounters:   02/16/24 4' 9\" (1.448 m)     Estimated body mass index is 24.89 kg/m  as calculated from the following:    Height as of 2/16/24: 4' 9\" (1.448 m).    Weight as of an earlier encounter on 3/4/24: 115 lb (52.2 kg).    Temp Readings from Last 1 Encounters:   03/04/24 97  F (36.1  C)       ----------------      I spent 75 minutes with this patient today. Dr. Pitt (resident physician) was provided the recommendations above  in clinic today and Dr. Salazar is the authorizing prescriber for this visit through the pharmacist collaborative practice agreement.. A copy of the visit note was provided to the patient's provider(s).    The patient was given to the patient a summary of these recommendations. See Provider note/AVS from today.     Lelo Weathers, Pharm.D., [facility]  Phone: [phone]    For insurance/tracking purposes, [facility] Team Documentation:   Medication Therapy Recommendations  Constipation    Current Medication: senna-docusate " (SENOKOT-S/PERICOLACE) 8.6-50 MG tablet   Rationale: Does not understand instructions - Adherence - Adherence   Recommendation: Provide Education   Status: Patient Agreed - Adherence/Education         DM2 (diabetes mellitus, type 2) (H)    Current Medication: Continuous Blood Gluc Sensor (FREESTYLE LEXI 2 SENSOR) MISC   Rationale: Does not understand instructions - Adherence - Adherence   Recommendation: Provide Education   Status: Patient Agreed - Adherence/Education          Current Medication: empagliflozin (JARDIANCE) 10 MG TABS tablet (Discontinued)   Rationale: Undesirable effect - Adverse medication event - Safety   Recommendation: Discontinue Medication   Status: Accepted per CPA          Current Medication: insulin glargine (LANTUS PEN) 100 UNIT/ML pen   Rationale: Does not understand instructions - Adherence - Adherence   Recommendation: Provide Education   Status: Patient Agreed - Adherence/Education          Current Medication: metFORMIN (GLUCOPHAGE XR) 500 MG 24 hr tablet   Rationale: Patient forgets to take - Adherence - Adherence   Recommendation: Decrease Dose   Status: Accepted per CPA

## 2024-03-05 LAB — BACTERIA UR CULT: NO GROWTH

## 2024-03-20 ENCOUNTER — OFFICE VISIT (OUTPATIENT)
Dept: FAMILY MEDICINE | Facility: CLINIC | Age: 79
End: 2024-03-20
Payer: COMMERCIAL

## 2024-03-20 ENCOUNTER — OFFICE VISIT (OUTPATIENT)
Dept: PHARMACY | Facility: CLINIC | Age: 79
End: 2024-03-20
Payer: COMMERCIAL

## 2024-03-20 VITALS
RESPIRATION RATE: 16 BRPM | SYSTOLIC BLOOD PRESSURE: 129 MMHG | HEART RATE: 90 BPM | DIASTOLIC BLOOD PRESSURE: 77 MMHG | OXYGEN SATURATION: 96 %

## 2024-03-20 DIAGNOSIS — Z79.4 TYPE 2 DIABETES MELLITUS WITH OTHER NEUROLOGIC COMPLICATION, WITH LONG-TERM CURRENT USE OF INSULIN (H): Primary | ICD-10-CM

## 2024-03-20 DIAGNOSIS — E11.65 TYPE 2 DIABETES MELLITUS WITH HYPERGLYCEMIA, UNSPECIFIED WHETHER LONG TERM INSULIN USE (H): ICD-10-CM

## 2024-03-20 DIAGNOSIS — S72.001D HIP FRACTURE, RIGHT, CLOSED, WITH ROUTINE HEALING, SUBSEQUENT ENCOUNTER: ICD-10-CM

## 2024-03-20 DIAGNOSIS — R26.89 IMPAIRED GAIT AND MOBILITY: ICD-10-CM

## 2024-03-20 DIAGNOSIS — S72.001A HIP FRACTURE, RIGHT, CLOSED, INITIAL ENCOUNTER (H): ICD-10-CM

## 2024-03-20 DIAGNOSIS — E11.49 TYPE 2 DIABETES MELLITUS WITH OTHER NEUROLOGIC COMPLICATION, WITH LONG-TERM CURRENT USE OF INSULIN (H): Primary | ICD-10-CM

## 2024-03-20 DIAGNOSIS — B80 PINWORM INFECTION: ICD-10-CM
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
## 2024-03-20 PROBLEM — K21.00 CHRONIC REFLUX ESOPHAGITIS: Status: ACTIVE | Noted: 2024-03-20

## 2024-03-20 PROBLEM — M62.81 MUSCLE WEAKNESS (GENERALIZED): Status: ACTIVE | Noted: 2024-03-20

## 2024-03-20 PROBLEM — K59.00 CONSTIPATION: Status: ACTIVE | Noted: 2024-03-20

## 2024-03-20 PROBLEM — M12.9 ARTHROPATHY: Status: ACTIVE | Noted: 2024-03-20

## 2024-03-20 PROBLEM — Z91.09 OTHER ALLERGY, OTHER THAN TO MEDICINAL AGENTS: Status: ACTIVE | Noted: 2024-03-20

## 2024-03-20 PROBLEM — M17.9 OSTEOARTHRITIS OF KNEE: Status: ACTIVE | Noted: 2024-03-20

## 2024-03-20 PROBLEM — R41.3 MEMORY LOSS: Status: ACTIVE | Noted: 2024-03-20

## 2024-03-20 PROBLEM — M25.461 EFFUSION OF RIGHT KNEE: Status: ACTIVE | Noted: 2017-11-12

## 2024-03-20 PROBLEM — R32 UNSPECIFIED URINARY INCONTINENCE: Status: ACTIVE | Noted: 2024-03-20

## 2024-03-20 PROBLEM — E87.1 HYPONATREMIA: Status: ACTIVE | Noted: 2017-11-13

## 2024-03-20 PROBLEM — H35.30 MACULAR DEGENERATION: Status: ACTIVE | Noted: 2024-03-20

## 2024-03-20 PROBLEM — E55.9 VITAMIN D DEFICIENCY: Status: ACTIVE | Noted: 2024-03-20

## 2024-03-20 PROBLEM — R73.09 OTHER ABNORMAL GLUCOSE: Status: ACTIVE | Noted: 2024-03-20

## 2024-03-20 PROBLEM — H26.9 CATARACT: Status: ACTIVE | Noted: 2024-03-20

## 2024-03-20 PROCEDURE — 99606 MTMS BY PHARM EST 15 MIN: CPT | Performed by: PHARMACIST

## 2024-03-20 PROCEDURE — 99214 OFFICE O/P EST MOD 30 MIN: CPT | Mod: GC

## 2024-03-20 PROCEDURE — 99607 MTMS BY PHARM ADDL 15 MIN: CPT | Performed by: PHARMACIST

## 2024-03-20 RX ORDER — AMOXICILLIN 250 MG
1 CAPSULE ORAL DAILY PRN
Qty: 90 TABLET | Refills: 1 | Status: SHIPPED | OUTPATIENT
Start: 2024-03-20

## 2024-03-20 RX ORDER — RESPIRATORY SYNCYTIAL VIRUS VACCINE 120MCG/0.5
0.5 KIT INTRAMUSCULAR ONCE
Qty: 1 EACH | Refills: 0 | Status: CANCELLED | OUTPATIENT
Start: 2024-03-20 | End: 2024-03-20

## 2024-03-20 RX ORDER — ALBENDAZOLE 200 MG/1
400 TABLET, FILM COATED ORAL
Qty: 4 TABLET | Refills: 0 | Status: SHIPPED | OUTPATIENT
Start: 2024-03-20 | End: 2024-04-04

## 2024-03-20 NOTE — LETTER
March 28, 2024      [facility]  [address]  [place] [address]        Dear ,    We are writing to inform you of your test results.    Your test results fall within the expected range(s) or remain unchanged from previous results.  Please continue with current treatment plan.    Resulted Orders   Ova And Parasite - Stool ([facility])   Result Value Ref Range    OVA AND PARASITE EXAM Negative Negative      Comment:      A single negative specimen does not rule out parasitic infection.    Narrative    Cryptosporidium, Cyclospora and Microsporidia are not readily detected by this method.       If you have any questions or concerns, please call the clinic at the number listed above.       Sincerely,      Quincy Rodriguez MD

## 2024-03-20 NOTE — PROGRESS NOTES
Medication Therapy Management (MTM) Encounter    ASSESSMENT:                            Medication Adherence/Access: See below for considerations    Type 2 diabetes mellitus with hyperglycemia, unspecified whether long term insulin use (H)  Last A1c in December above goal <8%. However, average glucose calculates A1c of 7.5%. Likely that A1c is closer to goal than previous lab draw. Will need A1c at next visit. Fasting sugars are within goal range, and typically spikes post-prandial. Would not be appropriate to increase basal insulin due to risk of fasting hypoglycemia. Additionally, only capturing 61% of data on CGM, goal >70%. Could consider GLP-1 in future with subsequent decrease in basal insulin to decrease post-prandial insulin spike.    Hip fracture, right, closed, initial encounter (H)  Senna was causing diarrhea, family switched to using as needed, approximately twice weekly. Appropriate - no concern for PRN use.    PLAN:                            Patient Instructions   Scan your CGM sensor at the following times:   - When you wake up  - When you take your evening pills  - When you go to bed and take your insulin    You can always scan more, but we should scan at least three times    If the sensor falls off, call [phone]. Follow the prompts to the main menu and select option 2 (Technical Product Support, Training, and Replacements)    Follow-up: On 4/7/2024 for co-visit with PCP    Medication issues to be addressed at a future visit     A1c, BMP, and vitamin D levels at next visit  Follow up DXA, repeat Vitamin D level, start Calcium supplement? Or at least assess Ca intake in diet  Once basal insulin titrated and fasting blood glucose near goal <150, if post-prandial blood glucose elevated, start Victoza or weekly GLP1 over Novolog    SUBJECTIVE/OBJECTIVE:                          Claudia Mckeon is a 79 year old female coming in for pharmacist visit.  used for visit (Y/N): Yes; Leanne. Daughter and  "daughter's  were present for today's visit.    Reason for visit: diabetes follow-up.    Allergies/ADRs: Reviewed in chart  Past Medical History: Reviewed in chart  Social History     Tobacco Use    Smoking status: Never     Passive exposure: Never    Smokeless tobacco: Never     ^Reviewed today    Medication Adherence/Access: Patient's daughter and  fill pill box and manage medications. Takes oral medications once daily in the evening. Lantus at night.    Type 2 diabetes mellitus with hyperglycemia, unspecified whether long term insulin use (H)  Continues on Metformin  mg daily, atorvastatin 20 mg daily, and Lantus 10 units daily. Daughter states that compliance has been much better since the last visit. No concerns with current medications, no low blood sugars. Since last visit, urinary symptoms have resolved. Family had questions about what symptoms to watch for with highs/lows and what glucose goals Ree should have.    Hip fracture, right, closed, initial encounter (H)  Senna was causing diarrhea, so family took it out of the pill box. Now used only as needed about 2 times per week. No concerns with now using PRN.          Today's Vitals:   BP Readings from Last 1 Encounters:   03/20/24 129/77     Pulse Readings from Last 1 Encounters:   03/20/24 90     Wt Readings from Last 1 Encounters:   03/04/24 115 lb (52.2 kg)     Ht Readings from Last 1 Encounters:   02/16/24 4' 9\" (1.448 m)     Estimated body mass index is 24.89 kg/m  as calculated from the following:    Height as of 2/16/24: 4' 9\" (1.448 m).    Weight as of 3/4/24: 115 lb (52.2 kg).    Temp Readings from Last 1 Encounters:   03/04/24 97  F (36.1  C)       ----------------      I spent 30 minutes with this patient today. Dr. JB Pitt (resident physician) was provided the recommendations above  in clinic today and Dr. SUZIE Mcclure is the authorizing prescriber for this visit through the pharmacist collaborative practice agreement.. A copy of " the visit note was provided to the patient's provider(s).    The patient was given to the patient a summary of these recommendations. See Provider note/AVS from today.     Tara Smith, Pharmacy Student  [facility]    Lelo Weathers, Pharm.D., CDCES [facility]  Phone: [phone]    For insurance/tracking purposes, MTM Team Documentation:   Medication Therapy Recommendations  Constipation    Current Medication: senna-docusate (SENOKOT-S/PERICOLACE) 8.6-50 MG tablet (Discontinued)   Rationale: Dose too high - Dosage too high - Safety   Recommendation: Decrease Frequency   Status: Accepted per CPA   Note: PRN         Type 2 diabetes mellitus (H)    Current Medication: Continuous Blood Gluc Sensor (FREESTYLE LEXI 2 SENSOR) MISC   Rationale: Patient forgets to take - Adherence - Adherence   Recommendation: Provide Education   Status: Patient Agreed - Adherence/Education   Note: Scan every 8 hours to capture more data

## 2024-03-20 NOTE — PROGRESS NOTES
Preceptor Attestation:  Patient's case reviewed and discussed with Vanita Pitt MD resident and I evaluated the patient. I agree with written assessment and plan of care.  Supervising Physician:  Quincy Rodriguez MD, MD [facility]

## 2024-03-20 NOTE — PROGRESS NOTES
Assessment & Plan     (E11.49,  Z79.4) Type 2 diabetes mellitus with other neurologic complication, with long-term current use of insulin (H)  (primary encounter diagnosis)  Comment: MTSHELBI orlinit today, appreciate assistance. Will update med list to accurately reflect the 10u Lantus she is using at night, holding mealtime insulin at this time. Will continue metformin, monitor. A1c drawn.  Plan: HEMOGLOBIN A1C          (B80) Pinworm infection  Comment: Likely a pinworm infection based on history and symptoms. Will need a stool O&P test to rule out whipworm or other stool-based parasites. Plan for treatment with albendazole once today, then once again in 2 weeks, will need to adjust if positive for whipworm. Plan for follow-up as needed  Plan: albendazole (ALBENZA) 200 MG tablet, Ova And         Parasite - Stool ([employer]), CANCELED: Ova         and Parasites ([facility])          (R26.89) Impaired gait and mobility  (S72.001D) Hip fracture, right, closed, with routine healing, subsequent encounter  Comment: Per family, seems as though PT has not come by. Also having trouble getting wheelchair. Will ask staff to follow-up with these, plan for re-referrals if needed. Follow-up in 1 month  Plan: follow-up 1 month        Return in about 4 weeks (around 4/17/2024) for Follow up, with me.    Rancho   Ree is a 79 year old, presenting for the following health issues:  Diabetes (A1c, per patient family they have no concerns)        3/20/2024     9:50 AM   Additional Questions   Roomed by Ety   Accompanied by daughter and son in law         3/20/2024    Information    services provided? Yes   Chito Perdomo   Type of interpretation provided Face-to-face    name [facility]    Agency [employer]     Here for DM follow-up  Per my last note on 3/4/2024:  (E11.65) Type 2 diabetes mellitus with hyperglycemia, unspecified whether long term insulin use (H)  (primary encounter  "diagnosis)  Comment: Was given small insulin syringes rather than insulin pen, appreciate MTM's counseling and teaching on this today. Plan to only move forward with 7 units Lantus for now to start, as this is what she was tolerating previously. Will stop ASA, titrated down to 1 metformin a day, stop jardiance due to increased urinary symptoms. Should continue monitoring with CGM. Plan for follow-up in 2 weeks.  Plan: follow-up 2 weeks    Current medications include   Diabetes Medication(s)       Biguanides       metFORMIN (GLUCOPHAGE XR) 500 MG 24 hr tablet Take 1 tablet (500 mg) by mouth every morning       Insulin       insulin glargine (LANTUS PEN) 100 UNIT/ML pen Inject 10 Units Subcutaneous at bedtime        Last A1c  Lab Results   Component Value Date    A1C >15.0 12/19/2023    A1C 6.7 12/02/2014    A1C 6.9 09/02/2014    A1C 7.2 05/05/2014    A1C 7.4 01/16/2014   Covisit with MTM today, appreciate assistance  BG time in target 58%, above 42% but only 56% usage (CGM fell off a few days ago)  Average BG over last 14 days:  - AM fasting 138  - Post prandials 195-212  Uses 10u lantus at night only  Feeling good      Does complain of seeing worms in her stool and anal area  Does have itching at night as well  In the past she has taken medication for this but had to stop given she could not afford it  No diarrhea, no loose stools noted  IN the past, has had treatment, reportedly only a single dose with an additional dose after a \"little while\"  Does see some small white worms present sometimes as well  Otherwise no bowel or stool dysfunction    Family does report that they have had trouble getting the wheelchair for the patient  Patient hasn't been able to ambulate much  Also have not seen PT come through to help with home PT  Will get staff to evaluate      Review of Systems  Constitutional, neuro, ENT, endocrine, pulmonary, cardiac, gastrointestinal, genitourinary, musculoskeletal, integument and psychiatric " systems are negative, except as otherwise noted.      Objective    /77 (BP Location: Right arm, Patient Position: Sitting, Cuff Size: Adult Regular)   Pulse 90   Resp 16   SpO2 96%   There is no height or weight on file to calculate BMI.    Physical Exam   General: Alert and oriented x 3, no acute distress  Skin: clean, dry, and intact  HEENT: normocephalic, atraumatic, PERRL, EOMI, no conjunctival injection or icterus, ears and nose normal, no LAD or masses  Resp: clear to ausculation bilaterally, no rales or wheezes  Cardio: RRR, S1 and S2 present, no S3 or S4, no rubs or murmurs  Abdomen: soft, nontender, nondistended, bowel sounds present x 4, no masses, no hepatosplenomegaly  Extremities: no erythema or edema. Wheelchair bound.  Psych: normal mood, normal mentation            Signed Electronically by: Vanita Pitt MD  \Precepted patient with Dr. Quincy Rodriguez.

## 2024-03-20 NOTE — PATIENT INSTRUCTIONS
Scan your CGM sensor at the following times:   - When you wake up  - When you take your evening pills  - When you go to bed and take your insulin    You can always scan more, but we should scan at least three times    If the sensor falls off, call [phone]. Follow the prompts to the main menu and select option 2 (Technical Product Support, Training, and Replacements)

## 2024-03-20 NOTE — PROGRESS NOTES
Medication Therapy Management (MTM) Encounter    ASSESSMENT:                            Medication Adherence/Access: See below for considerations    Type 2 diabetes mellitus with hyperglycemia, unspecified whether long term insulin use (H)  Last A1c in December above goal <8%. However, average glucose calculates A1c of 7.5%. Likely that A1c is closer to goal than previous lab draw. Additionally, only capturing 61% of data on CGM, goal >70%, therefore not capturing all data - average could change. Will need A1c at next visit. Fasting sugars are within goal range, and typically spikes post-prandial. Would not be appropriate to increase basal insulin due to risk of fasting hypoglycemia. Could consider GLP-1 in future with subsequent decrease in basal insulin to decrease post-prandial insulin spike.     Hip fracture, right, closed, initial encounter (H)  Senna was causing diarrhea, family switched to using as needed, approximately twice weekly. Appropriate - no concern for PRN use.     PLAN:                            Patient Instructions   Scan your CGM sensor at the following times:   - When you wake up  - When you take your evening pills  - When you go to bed and take your insulin    You can always scan more, but we should scan at least three times    If the sensor falls off, call [phone]. Follow the prompts to the main menu and select option 2 (Technical Product Support, Training, and Replacements)    Follow-up: On 4/7/2024 for co-visit with PCP     Medication issues to be addressed at a future visit     A1c, BMP, and vitamin D levels at next visit  Follow up DXA, repeat Vitamin D level, start Calcium supplement? Or at least assess Ca intake in diet  Once basal insulin titrated and fasting blood glucose near goal <150, if post-prandial blood glucose elevated, start Victoza or weekly GLP1 over Novolog    SUBJECTIVE/OBJECTIVE:                          Claudia Mckeon is a 79 year old female coming in for pharmacist  "visit.  used for visit (Y/N): Yes; Leanne. Daughter and daughter's  were present for today's visit.     Reason for visit: diabetes follow-up.    Allergies/ADRs: Reviewed in chart  Past Medical History: Reviewed in chart  Social History     Tobacco Use    Smoking status: Never     Passive exposure: Never    Smokeless tobacco: Never     ^Reviewed today    Medication Adherence/Access: Patient's daughter and  fill pill box and manage medications. Takes oral medications once daily in the evening. Lantus at night     Type 2 diabetes mellitus with hyperglycemia, unspecified whether long term insulin use (H)  Continues on Metformin  mg daily, atorvastatin 20 mg daily, and Lantus 10 units daily. Daughter states that compliance has been much better since the last visit. No concerns with current medications, no low blood sugars. Since last visit, urinary symptoms have resolved. Family had questions about what symptoms to watch for with highs/lows and what glucose goals Ree should have.     Hip fracture, right, closed, initial encounter (H)  Senna was causing diarrhea when used daily, so family took it out of the pill box. Now used only as needed about 2 times per week. No concerns with now using PRN.               Today's Vitals:   BP Readings from Last 1 Encounters:   03/20/24 129/77     Pulse Readings from Last 1 Encounters:   03/20/24 90     Wt Readings from Last 1 Encounters:   03/04/24 115 lb (52.2 kg)     Ht Readings from Last 1 Encounters:   02/16/24 4' 9\" (1.448 m)     Estimated body mass index is 24.89 kg/m  as calculated from the following:    Height as of 2/16/24: 4' 9\" (1.448 m).    Weight as of 3/4/24: 115 lb (52.2 kg).    Temp Readings from Last 1 Encounters:   03/04/24 97  F (36.1  C)       ----------------      I spent 30 minutes with this patient today. Dr. JB Pitt (resident physician) was provided the recommendations above  in clinic today and Dr. SUZIE Mcclure is the authorizing " prescriber for this visit through the pharmacist collaborative practice agreement.. A copy of the visit note was provided to the patient's provider(s).    The patient was given to the patient a summary of these recommendations. See Provider note/AVS from today.     Tara Smith, Pharmacy Student  [facility]    Lelo Weathers, Pharm.D., CDCES [facility]  Phone: [phone]    For insurance/tracking purposes, MTM Team Documentation:   Medication Therapy Recommendations  Constipation    Current Medication: senna-docusate (SENOKOT-S/PERICOLACE) 8.6-50 MG tablet (Discontinued)   Rationale: Dose too high - Dosage too high - Safety   Recommendation: Decrease Frequency   Status: Accepted per CPA   Note: PRN         Type 2 diabetes mellitus (H)    Current Medication: Continuous Blood Gluc Sensor (FREESTYLE LEXI 2 SENSOR) MISC   Rationale: Patient forgets to take - Adherence - Adherence   Recommendation: Provide Education   Status: Patient Agreed - Adherence/Education   Note: Scan every 8 hours to capture more data

## 2024-03-22 PROCEDURE — 87209 SMEAR COMPLEX STAIN: CPT

## 2024-03-22 PROCEDURE — 87177 OVA AND PARASITES SMEARS: CPT

## 2024-03-25 LAB — O+P STL MICRO: NEGATIVE

## 2024-05-09 ENCOUNTER — OFFICE VISIT (OUTPATIENT)
Dept: FAMILY MEDICINE | Facility: CLINIC | Age: 79
End: 2024-05-09
Payer: COMMERCIAL

## 2024-05-09 VITALS
WEIGHT: 118 LBS | OXYGEN SATURATION: 95 % | HEIGHT: 57 IN | DIASTOLIC BLOOD PRESSURE: 66 MMHG | SYSTOLIC BLOOD PRESSURE: 138 MMHG | TEMPERATURE: 97.9 F | HEART RATE: 91 BPM | RESPIRATION RATE: 16 BRPM | BODY MASS INDEX: 25.46 KG/M2

## 2024-05-09 DIAGNOSIS — S72.001D HIP FRACTURE, RIGHT, CLOSED, WITH ROUTINE HEALING, SUBSEQUENT ENCOUNTER: ICD-10-CM

## 2024-05-09 DIAGNOSIS — E11.49 TYPE 2 DIABETES MELLITUS WITH OTHER NEUROLOGIC COMPLICATION, WITH LONG-TERM CURRENT USE OF INSULIN (H): Primary | ICD-10-CM

## 2024-05-09 DIAGNOSIS — Z79.4 TYPE 2 DIABETES MELLITUS WITH OTHER NEUROLOGIC COMPLICATION, WITH LONG-TERM CURRENT USE OF INSULIN (H): Primary | ICD-10-CM

## 2024-05-09 LAB — HBA1C MFR BLD: 8.9 % (ref 0–5.6)

## 2024-05-09 PROCEDURE — 83036 HEMOGLOBIN GLYCOSYLATED A1C: CPT

## 2024-05-09 PROCEDURE — 36416 COLLJ CAPILLARY BLOOD SPEC: CPT

## 2024-05-09 PROCEDURE — 99214 OFFICE O/P EST MOD 30 MIN: CPT | Mod: GC

## 2024-05-09 RX ORDER — ACETAMINOPHEN 500 MG
1000 TABLET ORAL 3 TIMES DAILY
Qty: 180 TABLET | Refills: 0 | Status: SHIPPED | OUTPATIENT
Start: 2024-05-09

## 2024-05-09 RX ORDER — RESPIRATORY SYNCYTIAL VIRUS VACCINE 120MCG/0.5
0.5 KIT INTRAMUSCULAR ONCE
Qty: 1 EACH | Refills: 0 | Status: CANCELLED | OUTPATIENT
Start: 2024-05-09 | End: 2024-05-09

## 2024-05-09 ASSESSMENT — PATIENT HEALTH QUESTIONNAIRE - PHQ9
10. IF YOU CHECKED OFF ANY PROBLEMS, HOW DIFFICULT HAVE THESE PROBLEMS MADE IT FOR YOU TO DO YOUR WORK, TAKE CARE OF THINGS AT HOME, OR GET ALONG WITH OTHER PEOPLE: SOMEWHAT DIFFICULT
SUM OF ALL RESPONSES TO PHQ QUESTIONS 1-9: 8
SUM OF ALL RESPONSES TO PHQ QUESTIONS 1-9: 8

## 2024-05-09 NOTE — PATIENT INSTRUCTIONS
Increase your Lantus to 12 units  Please START using your Novolog at 4 units with EACH meal  Remember to scan!

## 2024-05-09 NOTE — PROGRESS NOTES
Assessment & Plan     (E11.49,  Z79.4) Type 2 diabetes mellitus with other neurologic complication, with long-term current use of insulin (H)  (primary encounter diagnosis)  Comment: Discussed patient's concerns today.  Likely that her CGM, which was set to alarm for highs at 240, simply needs to be adjusted.  I did adjust her settings in office today.  Her CGM will now alarm at 300 for highs, though I did keep her alarm for lows at 70.  Reviewed previous data from CGM, last dated 3/20/2024, does appear that her blood glucose does occasionally go over 300 with meals, mostly during the day.  With this data, we will increase her nightly Lantus from 10 to 12 units nightly and start mealtime NovoLog at 4 units 3 times daily.  Discussed this with patient and her daughter, who are both agreeable to this plan, plan for follow-up in 2 weeks.  Plan: Hemoglobin A1c, insulin glargine (LANTUS PEN)         100 UNIT/ML pen, insulin aspart (NOVOLOG PEN)         100 UNIT/ML pen          (S72.001D) Hip fracture, right, closed, with routine healing, subsequent encounter  Comment: Stable, refilled.  Plan: acetaminophen (TYLENOL) 500 MG tablet            Return in about 2 weeks (around 5/23/2024) for Follow up, with me.      Rancho Taylor is a 79 year old, presenting for the following health issues:  Recheck Medication (Follow-up/No other concerns)    HPI     Here for follow-up on DM  Per MTM note from 3/20/2024:    Type 2 diabetes mellitus with hyperglycemia, unspecified whether long term insulin use (H)  Last A1c in December above goal <8%. However, average glucose calculates A1c of 7.5%. Likely that A1c is closer to goal than previous lab draw. Additionally, only capturing 61% of data on CGM, goal >70%, therefore not capturing all data - average could change. Will need A1c at next visit. Fasting sugars are within goal range, and typically spikes post-prandial. Would not be appropriate to increase basal insulin due to risk of  "fasting hypoglycemia. Could consider GLP-1 in future with subsequent decrease in basal insulin to decrease post-prandial insulin spike.     Current medications include   Diabetes Medication(s)       Biguanides       metFORMIN (GLUCOPHAGE XR) 500 MG 24 hr tablet Take 1 tablet (500 mg) by mouth every morning       Insulin       insulin glargine (LANTUS PEN) 100 UNIT/ML pen Inject 10 Units Subcutaneous at bedtime        Last A1c -   Lab Results   Component Value Date    A1C >15.0 12/19/2023    A1C 6.7 12/02/2014    A1C 6.9 09/02/2014    A1C 7.2 05/05/2014    A1C 7.4 01/16/2014   Today's BG - Unclear. Has not been using CGM lately after it fell off (does not like the stickiness)  Has not had it on for the last 14 days  Appears she doesn't like it because it's been beeping at her frequently  Likely BG highs as she does not have mealtime insulin  Due to the beeping, patient has been refusing to wear her CGM  Discussed at length with patient today, appears the patient has capacity to make decision  Appears increasingly frustrated that people are close to her with this  After discussing with patient, patient agreed to trial again  Has not had fevers, chills, Shortness of Breath, Chest Pain, Nausea, Vomiting increasing urinary frequency, abdominal pain, lightheadedness, or dizziness at this time.  Otherwise is doing well    Cognitive testing:  Recall  able to recall all words from the beginning (2 pts each)- 8/8   Language   8/8 animals named in 30 seconds    TOTAL: 16/16    Unable to perform clock draw test given inability to write          Review of Systems  Constitutional, HEENT, cardiovascular, pulmonary, gi and gu systems are negative, except as otherwise noted.      Objective    /66   Pulse 91   Temp 97.9  F (36.6  C)   Resp 16   Ht 1.443 m (4' 8.81\")   Wt 53.5 kg (118 lb)   SpO2 95%   BMI 25.71 kg/m    Body mass index is 25.71 kg/m .    Physical Exam   General: Alert and oriented x 3, no acute distress. "   Skin: clean, dry, and intact  HEENT: normocephalic, atraumatic, PERRL, EOMI, no conjunctival injection or icterus, ears and nose normal, no LAD or masses  Resp: clear to ausculation bilaterally, no rales or wheezes  Cardio: RRR, S1 and S2 present, no S3 or S4, no rubs or murmurs  Abdomen: soft, nontender, nondistended, bowel sounds present x 4, no masses  Extremities: no erythema or edema noted today.  Declined exam of her hip today  Psych: Irritated, though redirectable and pleasant in conversation once we discussed the reason for the major          Signed Electronically by: Vanita Pitt MD  Precepted patient with Dr. Purnima Mcclure.

## 2024-05-10 ENCOUNTER — DOCUMENTATION ONLY (OUTPATIENT)
Dept: FAMILY MEDICINE | Facility: CLINIC | Age: 79
End: 2024-05-10

## 2024-05-10 NOTE — PROGRESS NOTES
Forms Request:  Today's Date: May 10, 2024   Form Type:  Leanne blas [facility]     Where is the form from:   Same as above    How was form received: Fax  TEMO on file: YES - Date: 04/03/2024     How is form being returned: fax  Fax Number/Address: [phone]  PCP: Stacey Granger Team: Green Team  Form Given to: vickie     no back pain/no neck pain, no chest pain

## 2024-05-28 NOTE — PROGRESS NOTES
Assessment & Plan     (E11.43,  Z79.4) Type 2 diabetes mellitus with diabetic autonomic neuropathy, with long-term current use of insulin (H)  (primary encounter diagnosis)  Comment: Covisit with MTM today, appreciate assistance. Unclear regimen given her CGM fell off and does not have her meter with her today, suspect that her BG is still quite high postprandial. Will change tegimen to 6 units Novolog and 10 units Lantus. Given her continued neuropathy symptoms, will trial Duloxetine as well. Suspect tighter BG control will improve her neuropathy. Plan for follow-up in 2-4 weeks.  Plan: DULoxetine (CYMBALTA) 30 MG capsule          (L91.8) Skin tag  Comment: Notable areas of skin tags, most prominent skin tag under the L ear that she would like removed today. Discussed risks and benefits of removal, patient agreeable, consent form signed. Plan for follow-up as needed.  Plan: DESTRUCT BENIGN LESION, UP TO 14    Procedure note:  Under aseptic technique, the area was prepped with alcohol. Anesthesia was applied to the area in the form of liquid nitrogen. The lesion was then removed using a sterile curved iris scissors. Bleeding controlled with gauze. The area was then bandaged. Precautions given for return.             The longitudinal plan of care for the diagnosis(es)/condition(s) as documented were addressed during this visit. Due to the added complexity in care, I will continue to support Ree in the subsequent management and with ongoing continuity of care.    Follow up in 2 weeks.      Subjective   Ree is a 79 year old, presenting for the following health issues:  Skin Tags    HPI     Here for DM follow-up   Per my last note on 5/9/2024:  (E11.49,  Z79.4) Type 2 diabetes mellitus with other neurologic complication, with long-term current use of insulin (H)  (primary encounter diagnosis)  Comment: Discussed patient's concerns today.  Likely that her CGM, which was set to alarm for highs at 240, simply needs to be  adjusted.  I did adjust her settings in office today.  Her CGM will now alarm at 300 for highs, though I did keep her alarm for lows at 70.  Reviewed previous data from CGM, last dated 3/20/2024, does appear that her blood glucose does occasionally go over 300 with meals, mostly during the day.  With this data, we will increase her nightly Lantus from 10 to 12 units nightly and start mealtime NovoLog at 4 units 3 times daily.  Discussed this with patient and her daughter, who are both agreeable to this plan, plan for follow-up in 2 weeks.  Plan: Hemoglobin A1c, insulin glargine (LANTUS PEN)         100 UNIT/ML pen, insulin aspart (NOVOLOG PEN)         100 UNIT/ML pen    Covisit with MTM today, appreciate recs  Current medications include:  Diabetes Medication(s)       Biguanides       metFORMIN (GLUCOPHAGE XR) 500 MG 24 hr tablet Take 1 tablet (500 mg) by mouth 2 times daily (with meals)       Insulin       insulin aspart (NOVOLOG PEN) 100 UNIT/ML pen Inject 3 Units Subcutaneous daily (with dinner)     insulin glargine (LANTUS PEN) 100 UNIT/ML pen Inject 12 Units Subcutaneous every morning        Last A1c -   Lab Results   Component Value Date    A1C 8.9 05/09/2024    A1C >15.0 12/19/2023    A1C 6.7 12/02/2014    A1C 6.9 09/02/2014    A1C 7.2 05/05/2014   Today's BG unable to determine since her meter fell off  Mostly beeping at her in the night time  Unsure of what her readings are, does not feel symptoms  Not doing as much insulin  Is having some burning and tingling consistent with diabetic nephropathy  Has tried Tylenol for that, but is mostly helping  A friend has tried gabapentin before, thought about requesting today  Discussed using duloxetine instead, patient agreeable    Does want a skin tag removed today  Skin tag is behind the L ear  Is itchy but otherwise doing well  Not bothering her other than it looks weird and itches mostly at night    Regarding hip fracture, is feeling much better over all  Leg  "pain is doing well        Review of Systems  Constitutional, neuro, ENT, endocrine, pulmonary, cardiac, gastrointestinal, genitourinary, musculoskeletal, integument and psychiatric systems are negative, except as otherwise noted.      Objective    /68   Pulse 94   Temp 99.2  F (37.3  C)   Resp 18   Ht 1.48 m (4' 10.27\")   Wt 53.1 kg (117 lb)   SpO2 96%   BMI 24.23 kg/m    Body mass index is 24.23 kg/m .    Physical Exam   General: Alert and oriented x 3, no acute distress  Skin: clean, dry, and intact. Notable skin tags in various areas of the body, the most prominent one behind the L ear.  HEENT: normocephalic, atraumatic, PERRL, EOMI, no conjunctival injection or icterus, ears and nose normal, no LAD or masses  Resp: clear to ausculation bilaterally, no rales or wheezes  Cardio: RRR, S1 and S2 present, no S3 or S4, no rubs or murmurs  Abdomen: soft, nontender, nondistended, bowel sounds present x 4, no masses, no hepatosplenomegaly  Extremities: no erythema or edema  Psych: normal mood, normal mentation              Signed Electronically by: Vanita Pitt MD  Precepted patient with Dr. Abdirahman Poon.    "

## 2024-05-29 ENCOUNTER — OFFICE VISIT (OUTPATIENT)
Dept: FAMILY MEDICINE | Facility: CLINIC | Age: 79
End: 2024-05-29
Payer: COMMERCIAL

## 2024-05-29 ENCOUNTER — OFFICE VISIT (OUTPATIENT)
Dept: PHARMACY | Facility: CLINIC | Age: 79
End: 2024-05-29
Payer: COMMERCIAL

## 2024-05-29 VITALS
HEIGHT: 58 IN | RESPIRATION RATE: 18 BRPM | BODY MASS INDEX: 24.56 KG/M2 | OXYGEN SATURATION: 96 % | SYSTOLIC BLOOD PRESSURE: 129 MMHG | DIASTOLIC BLOOD PRESSURE: 68 MMHG | WEIGHT: 117 LBS | HEART RATE: 94 BPM | TEMPERATURE: 99.2 F

## 2024-05-29 DIAGNOSIS — E11.51 TYPE II DIABETES MELLITUS WITH PERIPHERAL CIRCULATORY DISORDER (H): Primary | ICD-10-CM

## 2024-05-29 DIAGNOSIS — Z79.4 TYPE 2 DIABETES MELLITUS WITH DIABETIC AUTONOMIC NEUROPATHY, WITH LONG-TERM CURRENT USE OF INSULIN (H): Primary | ICD-10-CM

## 2024-05-29 DIAGNOSIS — S72.001A HIP FRACTURE, RIGHT, CLOSED, INITIAL ENCOUNTER (H): ICD-10-CM

## 2024-05-29 DIAGNOSIS — Z79.4 TYPE 2 DIABETES MELLITUS WITH OTHER NEUROLOGIC COMPLICATION, WITH LONG-TERM CURRENT USE OF INSULIN (H): ICD-10-CM

## 2024-05-29 DIAGNOSIS — E11.49 TYPE 2 DIABETES MELLITUS WITH OTHER NEUROLOGIC COMPLICATION, WITH LONG-TERM CURRENT USE OF INSULIN (H): ICD-10-CM

## 2024-05-29 DIAGNOSIS — E11.65 TYPE 2 DIABETES MELLITUS WITH HYPERGLYCEMIA, WITH LONG-TERM CURRENT USE OF INSULIN (H): ICD-10-CM

## 2024-05-29 DIAGNOSIS — L91.8 SKIN TAG: ICD-10-CM

## 2024-05-29 DIAGNOSIS — Z79.4 TYPE 2 DIABETES MELLITUS WITH HYPERGLYCEMIA, WITH LONG-TERM CURRENT USE OF INSULIN (H): ICD-10-CM

## 2024-05-29 DIAGNOSIS — E11.43 TYPE 2 DIABETES MELLITUS WITH DIABETIC AUTONOMIC NEUROPATHY, WITH LONG-TERM CURRENT USE OF INSULIN (H): Primary | ICD-10-CM

## 2024-05-29 PROCEDURE — 99607 MTMS BY PHARM ADDL 15 MIN: CPT | Performed by: PHARMACIST

## 2024-05-29 PROCEDURE — 99606 MTMS BY PHARM EST 15 MIN: CPT | Performed by: PHARMACIST

## 2024-05-29 PROCEDURE — 11200 RMVL SKIN TAGS UP TO&INC 15: CPT | Mod: GC

## 2024-05-29 PROCEDURE — 99214 OFFICE O/P EST MOD 30 MIN: CPT | Mod: 25

## 2024-05-29 RX ORDER — RESPIRATORY SYNCYTIAL VIRUS VACCINE 120MCG/0.5
0.5 KIT INTRAMUSCULAR ONCE
Qty: 1 EACH | Refills: 0 | Status: CANCELLED | OUTPATIENT
Start: 2024-05-29 | End: 2024-05-29

## 2024-05-29 RX ORDER — METFORMIN HCL 500 MG
500 TABLET, EXTENDED RELEASE 24 HR ORAL 2 TIMES DAILY WITH MEALS
Qty: 180 TABLET | Refills: 3 | Status: SHIPPED | OUTPATIENT
Start: 2024-05-29 | End: 2024-06-17

## 2024-05-29 NOTE — PROGRESS NOTES
Medication Therapy Management (MTM) Encounter    ASSESSMENT:                            Medication Adherence/Access: See below for considerations    Type 2 diabetes mellitus with hyperglycemia, with long-term current use of insulin (H)  Last A1c above goal <7%. Concern for misunderstanding of insulins. Do not have any home data today, however per patient report no concerns for low blood glucose. Given current basal insulin dose, current Novolog dose, while pt misunderstanding and different than prescribed, likely too high to continue safely. Given lack of data will have pt use once daily meal time for now. In future should also consider replacing meal time insulin with GLP1 given age and lower risk of hypoglycemia and greater ease of administration.     Hip fracture, right, closed, initial encounter (H)  Pain controlled. Need DXA.     PLAN:                            Continue Semglee 10 units daily  Change Novolog to 6 units with dinner  Referred for DXA scheduling  To review neuropathic pain and skin tag concerns with Dr. Pitt    Follow-up: Return in about 4 weeks (around 6/26/2024).    SUBJECTIVE/OBJECTIVE:                          Claudia Mckeon is a 79 year old female coming in for pharmacist visit.  used for visit (Y/N): Yes; Leanne.     Reason for visit: follow up - covisit today w/ Dr Pitt.    Allergies/ADRs: Reviewed in chart  Past Medical History: Reviewed in chart  Social History     Tobacco Use    Smoking status: Never     Passive exposure: Never    Smokeless tobacco: Never   Vaping Use    Vaping status: Never Used     ^Reviewed today    Medication Adherence/Access: no issues reported. Daughter continues to help with medicine administration.     Type 2 diabetes mellitus with hyperglycemia, with long-term current use of insulin (H)  Current medicines:  Semglee 10 in the morning  Novolog 12 in the evening    Patient history on efficacy/safety:   Doesn't have CGM sensor on today, fell off yesterday.  " at home, didn't bring to clinic today. 139 is the lowest. Denies concerns for hypoglycemia.     Hip fracture, right, closed, initial encounter (H)  Also some pain remains from hip fracture. No DXA yet. APAP helpful for hip pain but not for nerve pain (see below).      Other concerns today for Dr. Pitt:   Concerns today for nerve pain. L leg. Patient brings up as a suggestion from friend. Skin burning sensation all over body. Arm, hand. Intermittent. Comes and goes. Would like to try Gabapentin. Two kinds of pain. L bra line pain. Couple months. Also, requests to have skin tag removed    Today's Vitals:   BP Readings from Last 1 Encounters:   05/29/24 129/68     Pulse Readings from Last 1 Encounters:   05/29/24 94     Wt Readings from Last 1 Encounters:   05/29/24 117 lb (53.1 kg)     Ht Readings from Last 1 Encounters:   05/29/24 4' 10.27\" (1.48 m)     Estimated body mass index is 24.23 kg/m  as calculated from the following:    Height as of an earlier encounter on 5/29/24: 4' 10.27\" (1.48 m).    Weight as of an earlier encounter on 5/29/24: 117 lb (53.1 kg).    Temp Readings from Last 1 Encounters:   05/29/24 99.2  F (37.3  C)       ----------------      I spent 40 minutes with this patient today. Dr. Morgan (resident physician) was provided the recommendations above  via routed note and Dr. Poon is the authorizing prescriber for this visit through the pharmacist collaborative practice agreement.. A copy of the visit note was provided to the patient's provider(s).    The patient was given to the patient a summary of these recommendations.     Lelo Weathers, Pharm.D., CDC[facility]  Phone: [phone]    For insurance/tracking purposes, [facility] Team Documentation:   Medication Therapy Recommendations  Type 2 diabetes mellitus with hyperglycemia, with long-term current use of insulin (H)    Current Medication: insulin aspart (NOVOLOG PEN) 100 UNIT/ML pen   Rationale: " Does not understand instructions - Adherence - Adherence   Recommendation: Provide Education   Status: Patient Agreed - Adherence/Education

## 2024-05-31 RX ORDER — DULOXETIN HYDROCHLORIDE 30 MG/1
30 CAPSULE, DELAYED RELEASE ORAL DAILY
Qty: 90 CAPSULE | Refills: 0 | Status: SHIPPED | OUTPATIENT
Start: 2024-05-31

## 2024-06-03 NOTE — PROGRESS NOTES
Preceptor Attestation:   Patient seen, evaluated and discussed with the resident. I was present for and supervised the entire procedure. I have verified the content of the note, which accurately reflects my assessment of the patient and the plan of care.    Supervising Physician:Abdirahman Poon MD  [facility]

## 2024-06-11 NOTE — PROGRESS NOTES
Date form completed: 6/5/24  Form sent via: Fax  Date faxed or mailed: 6/6/24  Fax # or Address: [phone]  Completed by: Donna Uriostegui CMA

## 2024-06-17 ENCOUNTER — OFFICE VISIT (OUTPATIENT)
Dept: PHARMACY | Facility: CLINIC | Age: 79
End: 2024-06-17
Payer: COMMERCIAL

## 2024-06-17 VITALS
RESPIRATION RATE: 14 BRPM | WEIGHT: 120 LBS | TEMPERATURE: 98.8 F | HEIGHT: 57 IN | SYSTOLIC BLOOD PRESSURE: 112 MMHG | OXYGEN SATURATION: 96 % | DIASTOLIC BLOOD PRESSURE: 70 MMHG | HEART RATE: 90 BPM | BODY MASS INDEX: 25.89 KG/M2

## 2024-06-17 DIAGNOSIS — Z79.4 TYPE 2 DIABETES MELLITUS WITH HYPERGLYCEMIA, WITH LONG-TERM CURRENT USE OF INSULIN (H): ICD-10-CM

## 2024-06-17 DIAGNOSIS — E11.65 TYPE 2 DIABETES MELLITUS WITH HYPERGLYCEMIA, WITH LONG-TERM CURRENT USE OF INSULIN (H): ICD-10-CM

## 2024-06-17 DIAGNOSIS — Z79.4 TYPE 2 DIABETES MELLITUS WITH OTHER NEUROLOGIC COMPLICATION, WITH LONG-TERM CURRENT USE OF INSULIN (H): ICD-10-CM

## 2024-06-17 DIAGNOSIS — E11.69 TYPE 2 DIABETES MELLITUS WITH OTHER SPECIFIED COMPLICATION, WITH LONG-TERM CURRENT USE OF INSULIN (H): ICD-10-CM

## 2024-06-17 DIAGNOSIS — E11.51 TYPE II DIABETES MELLITUS WITH PERIPHERAL CIRCULATORY DISORDER (H): Primary | ICD-10-CM

## 2024-06-17 DIAGNOSIS — E11.49 TYPE 2 DIABETES MELLITUS WITH OTHER NEUROLOGIC COMPLICATION, WITH LONG-TERM CURRENT USE OF INSULIN (H): ICD-10-CM

## 2024-06-17 DIAGNOSIS — Z79.4 TYPE 2 DIABETES MELLITUS WITH OTHER SPECIFIED COMPLICATION, WITH LONG-TERM CURRENT USE OF INSULIN (H): ICD-10-CM

## 2024-06-17 PROCEDURE — 99606 MTMS BY PHARM EST 15 MIN: CPT | Performed by: PHARMACIST

## 2024-06-17 PROCEDURE — 99607 MTMS BY PHARM ADDL 15 MIN: CPT | Performed by: PHARMACIST

## 2024-06-17 RX ORDER — METFORMIN HCL 500 MG
500 TABLET, EXTENDED RELEASE 24 HR ORAL
Qty: 90 TABLET | Refills: 3 | Status: SHIPPED | OUTPATIENT
Start: 2024-06-17

## 2024-06-17 NOTE — PROGRESS NOTES
"Medication Therapy Management (MTM) Encounter    ASSESSMENT:                            Medication Adherence/Access: See below for considerations    Type II diabetes mellitus with peripheral circulatory disorder (H)  Not currently at goal of A1c <7%, could also consider higher A1c goal given age <7.5-8%.  Minimal data today, needing education to improve this. Continued confusion about medication regimen. Continues to take differently than prescribed. Regimen continues to be atypical with higher bolus dose in proportion to basal dose. Difficulties implementing changes at home, despite help of family member. Anticipate this is due to language and health literacy barriers. Pt preference for in person , not available today, a limitation to today's visit. Focus today on capturing more data to better understand efficacy/safety of currently prescribed medication regimen.    PLAN:                            - Added duloxetine to pillbox   - Continue Lantus 10 units at bedtime  - Continue Novolog 12 units at supper  - Educated on how to hold CGM reader by sensor for effective scanning, reviewed need to scan sensor at least 3 times a day, and added reminder \"alarms\" to  to facilitate this    Follow-up: Return in about 4 weeks (around 7/15/2024).; to see primary care provider in 2 weeks    SUBJECTIVE/OBJECTIVE:                          Claudia Mckeon is a 79 year old female seen for pharmacist visit.  used for visit (Y/N): Yes; Leanne. Here with daughter today.    Reason for visit: T2DM management.    Allergies/ADRs: Reviewed in chart  Past Medical History: Reviewed in chart  Social History     Tobacco Use    Smoking status: Never     Passive exposure: Never    Smokeless tobacco: Never   Vaping Use    Vaping status: Never Used     ^Reviewed today    Medication Adherence/Access: Non adherent to doses for medications. Not taking metformin at prescribed dosage and not using Novolog with every meal as " "originally prescribed, but adherent to regimens.    Type II diabetes mellitus with peripheral circulatory disorder (H)  - Metformin 500 mg XR - Take 1 tablet (500 mg) by mouth daily at supper  - Atorvastatin 20 mg - Take 1 tablet (20 mg) by mouth daily every evening  - Duloxetine 30 mg - Take 1 capsule (30 mg) by mouth daily every evening  - Insulin glargine - Inject 10 units subcutaneously every evening  - Insulin aspart - Inject 12 units daily with supper   - Freestyle Luisito 2 Sensor    Currently on dual therapy with metformin and basal/mealtime insulins. Daughter is concerned that their Freestyle Luisito 2 sensor may not be accurate. No reports of any hypoglycemic episodes or symptoms such as headache, dizziness, or fatigue. No reports of GI intolerances such as diarrhea, constipation, nausea or vomiting. They have a pillbox here today which confirms that they are only taking 1 tablet of metformin (500 mg) daily. When asked about this, they report that this is what their provider told them to do. The only other medication in the pillbox is atorvastatin. She was recently started on duloxetine and have the bottle here today in clinic. She is wondering if its okay to put it in with the rest of her medications in the pillbox. Reports using Novolog with one meal which is in the evening, but usually eats anywhere from 1-3 meals a day. Last A1c was on 5/9 and was 8.9%.            Today's Vitals:   BP Readings from Last 1 Encounters:   06/17/24 112/70     Pulse Readings from Last 1 Encounters:   06/17/24 90     Wt Readings from Last 1 Encounters:   06/17/24 120 lb (54.4 kg)     Ht Readings from Last 1 Encounters:   06/17/24 4' 9.48\" (1.46 m)     Estimated body mass index is 25.54 kg/m  as calculated from the following:    Height as of this encounter: 4' 9.48\" (1.46 m).    Weight as of this encounter: 120 lb (54.4 kg).    Temp Readings from Last 1 Encounters:   06/17/24 98.8  F (37.1  C) (Tympanic) "       ----------------      I spent 45 minutes with this patient today. Dr. Morgan (resident physician) was provided the recommendations above  via routed note and Dr. Poon is the authorizing prescriber for this visit through the pharmacist collaborative practice agreement.. A copy of the visit note was provided to the patient's provider(s).    The patient was given to the patient a summary of these recommendations.     Rito Amezquita, Pharmacy Intern  4th Year PharmD Candidate  Phone: [phone]    Lelo Weathers, Pharm.D., CDC[facility]  Phone: [phone]    For insurance/tracking purposes, MTM Team Documentation:   Medication Therapy Recommendations  Type 2 diabetes mellitus with hyperglycemia, with long-term current use of insulin (H)    Current Medication: insulin aspart (NOVOLOG PEN) 100 UNIT/ML pen   Rationale: Medication requires monitoring - Needs additional monitoring   Recommendation: Self-Monitoring   Status: Patient Agreed - Adherence/Education

## 2024-11-08 ENCOUNTER — TELEPHONE (OUTPATIENT)
Dept: FAMILY MEDICINE | Facility: CLINIC | Age: 79
End: 2024-11-08

## 2024-11-08 NOTE — TELEPHONE ENCOUNTER
Calling patient to reschedule due to provider not available. If patient call back please schedule patient.

## 2024-11-14 ENCOUNTER — OFFICE VISIT (OUTPATIENT)
Dept: FAMILY MEDICINE | Facility: CLINIC | Age: 79
End: 2024-11-14
Payer: COMMERCIAL

## 2024-11-14 ENCOUNTER — DOCUMENTATION ONLY (OUTPATIENT)
Dept: CARE COORDINATION | Facility: CLINIC | Age: 79
End: 2024-11-14

## 2024-11-14 ENCOUNTER — DOCUMENTATION ONLY (OUTPATIENT)
Dept: FAMILY MEDICINE | Facility: CLINIC | Age: 79
End: 2024-11-14

## 2024-11-14 VITALS
DIASTOLIC BLOOD PRESSURE: 80 MMHG | HEART RATE: 98 BPM | BODY MASS INDEX: 25.24 KG/M2 | RESPIRATION RATE: 18 BRPM | TEMPERATURE: 98.2 F | HEIGHT: 57 IN | OXYGEN SATURATION: 94 % | WEIGHT: 117 LBS | SYSTOLIC BLOOD PRESSURE: 131 MMHG

## 2024-11-14 DIAGNOSIS — H93.12 TINNITUS, LEFT: ICD-10-CM

## 2024-11-14 DIAGNOSIS — S72.001D HIP FRACTURE, RIGHT, CLOSED, WITH ROUTINE HEALING, SUBSEQUENT ENCOUNTER: ICD-10-CM

## 2024-11-14 DIAGNOSIS — Z29.89 NEED FOR MALARIA PROPHYLAXIS: ICD-10-CM

## 2024-11-14 DIAGNOSIS — S72.001A HIP FRACTURE, RIGHT, CLOSED, INITIAL ENCOUNTER (H): ICD-10-CM

## 2024-11-14 DIAGNOSIS — Z71.84 TRAVEL ADVICE ENCOUNTER: Primary | ICD-10-CM

## 2024-11-14 DIAGNOSIS — E11.43 TYPE 2 DIABETES MELLITUS WITH DIABETIC AUTONOMIC NEUROPATHY, WITH LONG-TERM CURRENT USE OF INSULIN (H): ICD-10-CM

## 2024-11-14 DIAGNOSIS — Z79.4 TYPE 2 DIABETES MELLITUS WITH DIABETIC AUTONOMIC NEUROPATHY, WITH LONG-TERM CURRENT USE OF INSULIN (H): ICD-10-CM

## 2024-11-14 DIAGNOSIS — Z59.71 INSUFFICIENT HEALTH INSURANCE COVERAGE: Primary | ICD-10-CM

## 2024-11-14 DIAGNOSIS — A09 TRAVELER'S DIARRHEA: ICD-10-CM

## 2024-11-14 DIAGNOSIS — Z59.71 INSURANCE COVERAGE PROBLEMS: ICD-10-CM

## 2024-11-14 LAB
EST. AVERAGE GLUCOSE BLD GHB EST-MCNC: 258 MG/DL
HBA1C MFR BLD: 10.6 % (ref 0–5.6)

## 2024-11-14 RX ORDER — AMOXICILLIN 250 MG
1 CAPSULE ORAL DAILY PRN
Qty: 90 TABLET | Refills: 1 | Status: SHIPPED | OUTPATIENT
Start: 2024-11-14

## 2024-11-14 RX ORDER — DULOXETIN HYDROCHLORIDE 30 MG/1
30 CAPSULE, DELAYED RELEASE ORAL DAILY
Qty: 90 CAPSULE | Refills: 0 | Status: SHIPPED | OUTPATIENT
Start: 2024-11-14

## 2024-11-14 RX ORDER — LOPERAMIDE HYDROCHLORIDE 2 MG/1
2 TABLET ORAL 4 TIMES DAILY PRN
Qty: 20 TABLET | Refills: 0 | Status: SHIPPED | OUTPATIENT
Start: 2024-11-14

## 2024-11-14 RX ORDER — AZITHROMYCIN 500 MG/1
500 TABLET, FILM COATED ORAL DAILY PRN
Qty: 3 TABLET | Refills: 0 | Status: SHIPPED | OUTPATIENT
Start: 2024-11-14 | End: 2024-11-17

## 2024-11-14 RX ORDER — ATOVAQUONE AND PROGUANIL HYDROCHLORIDE 250; 100 MG/1; MG/1
1 TABLET, FILM COATED ORAL DAILY
Qty: 37 TABLET | Refills: 0 | Status: SHIPPED | OUTPATIENT
Start: 2024-11-14

## 2024-11-14 RX ORDER — ACETAMINOPHEN 500 MG
1000 TABLET ORAL 3 TIMES DAILY
Qty: 180 TABLET | Refills: 0 | Status: SHIPPED | OUTPATIENT
Start: 2024-11-14

## 2024-11-14 RX ORDER — METFORMIN HYDROCHLORIDE 500 MG/1
500 TABLET, EXTENDED RELEASE ORAL
Qty: 90 TABLET | Refills: 3 | Status: SHIPPED | OUTPATIENT
Start: 2024-11-14

## 2024-11-14 ASSESSMENT — PATIENT HEALTH QUESTIONNAIRE - PHQ9
10. IF YOU CHECKED OFF ANY PROBLEMS, HOW DIFFICULT HAVE THESE PROBLEMS MADE IT FOR YOU TO DO YOUR WORK, TAKE CARE OF THINGS AT HOME, OR GET ALONG WITH OTHER PEOPLE: NOT DIFFICULT AT ALL
SUM OF ALL RESPONSES TO PHQ QUESTIONS 1-9: 8
SUM OF ALL RESPONSES TO PHQ QUESTIONS 1-9: 8

## 2024-11-14 NOTE — PATIENT INSTRUCTIONS
Travel Safety Recommendations    Avoid cooked food served at room temperature.   Avoid raw food, including raw vegetables unless they can be washed thoroughly.   Drink only beverages from sealed bottles or cans.   Water is safe if it has been boiled or chemically treated.   Avoid ice unless made from bottled/disinfected water.    Cover exposed skin.   Use an appropriate insect repellent. (see below)   Use permethrin-treated clothing and gear (such as boots, pants, socks, and tents). Travelers can buy pre-treated clothing and gear or treat them at home. Treated clothing remains protective after multiple washings. Permethrin should NOT be used directly on skin.   Stay and sleep under in air-conditioned or screened rooms.   Use a bed net if the sleeping area is exposed to the outdoors.    Choose safe vehicles and avoid motorbikes when possible.   Wear a seatbelt or a helmet at all times.   Do not drive after drinking alcohol or ride with someone who has been drinking.   Avoid driving at night; street lighting in certain parts of the world may be poor.  If you will be driving, research what driving permits and insurance you may need. It is recommended to get an International Driving Permit (IDP).   Avoid using local, unscheduled aircraft, and fly on larger planes (more than 30 seats) when possible.        Take 2 tablets of loperamide with the first episode of diarrhea, then 1 tablet with every episode afterwards.

## 2024-11-14 NOTE — CONFIDENTIAL NOTE
"SW submitted [facility] referral. Patient has been receiving collections bills from from Medicare:  Collection bill from Medicare. Per patient chart \"Collection bill from Medicare - they have [employer]  Reapplied 3mo, hasn't heard back.   Has been to other clinics.\"    DESHAWN HAMLIN, KASSANDRA, LADC      "

## 2024-11-14 NOTE — PROGRESS NOTES
Patient left medical bill at clinic for care coordinator to review. However, off-site financial worker will be the one calling patient to discuss. Writer placed  bill in envelop at  under patient's last name.  Please confirm name if patient comes for the bill. Carlos ANDREA

## 2024-11-14 NOTE — PROGRESS NOTES
Preceptor Attestation:   Patient seen, evaluated and discussed with the resident. I have verified the content of the note, which accurately reflects my assessment of the patient and the plan of care.    Supervising Physician:Leanne Barillas MD    [facility]

## 2024-11-14 NOTE — LETTER
2024      Ree May  [address]  [place] [address]    : 1945        To whom it may concern    This is to certify that the following medications:    Medication   Metformin 500mg daily  Lantus 10u qhs  Aspart 12u dinner  BG monitoring equipment - continuous glucose monitor, sensor, lancets  Duloxetine 30mg daily  Glucose chews 4g tablets    Senna 1 tablet daily   Malarone 250-100mg daily       is medically necessary and appropiate for the above patient - Ree May .        Please contact this office if further information is needed.      Sincerely,      Honoree MD Eddie      [facility] [address] [place] [address]  [phone]

## 2024-11-14 NOTE — PROGRESS NOTES
Prior to immunization administration, verified patients identity using patient s name and date of birth. Please see Immunization Activity for additional information.     Screening Questionnaire for Adult Immunization    Are you sick today?   No   Do you have allergies to medications, food, a vaccine component or latex?   No   Have you ever had a serious reaction after receiving a vaccination?   No   Do you have a long-term health problem with heart, lung, kidney, or metabolic disease (e.g., diabetes), asthma, a blood disorder, no spleen, complement component deficiency, a cochlear implant, or a spinal fluid leak?  Are you on long-term aspirin therapy?   No   Do you have cancer, leukemia, HIV/AIDS, or any other immune system problem?   No   Do you have a parent, brother, or sister with an immune system problem?   No   In the past 3 months, have you taken medications that affect  your immune system, such as prednisone, other steroids, or anticancer drugs; drugs for the treatment of rheumatoid arthritis, Crohn s disease, or psoriasis; or have you had radiation treatments?   No   Have you had a seizure, or a brain or other nervous system problem?   No   During the past year, have you received a transfusion of blood or blood    products, or been given immune (gamma) globulin or antiviral drug?   No   For women: Are you pregnant or is there a chance you could become       pregnant during the next month?   No   Have you received any vaccinations in the past 4 weeks?   No     Immunization questionnaire answers were all negative.      Patient instructed to remain in clinic for 15 minutes afterwards, and to report any adverse reactions.     Screening performed by Jl Diaz MA on 11/14/2024 at 2:56 PM.

## 2024-11-14 NOTE — PROGRESS NOTES
Assessment & Plan     Travel advice encounter  Traveling to [facility] with children for ~1mo.  -Letter of medical necessity provided for medications and diabetes checking equipment. Management of chronic conditions discussed below.   - Counseled on diabetes management and how to store insulin while on vacation.   -Typhoid vaccine today   -Declined COVID, received flu at [facility]    Traveler's diarrhea  Counseled on when to use azithromycin and how to use loperamide. Encouraged hand hygiene.  - azithromycin (ZITHROMAX) 500 MG tablet; Take 1 tablet (500 mg) by mouth daily as needed (traveler's diarrhea).  - loperamide (IMODIUM A-D) 2 MG tablet; Take 1 tablet (2 mg) by mouth 4 times daily as needed for diarrhea.    Need for malaria prophylaxis  - atovaquone-proguanil (MALARONE) 250-100 MG tablet; Take 1 tablet by mouth daily. Start 2 days before travel and continue 7 days after return.    Type 2 diabetes mellitus with diabetic autonomic neuropathy, with long-term current use of insulin (H)  A1c 10.6 today. Likely non-adherent to insulin regimen as family did not seem to know dosing and doesn't appear to have CGM today, no recent scans either. No hyperglycemic/hypoglycemic sx.   -Counseled resuming insulin regimen: lantus 10u at bedtime and aspart 12u w/dinner. Plan to follow-up prior before she goes to [facility] and adjust as appropriate. Patient agreeable to use CGM again and record blood sugars for review.   - blood glucose (NO BRAND SPECIFIED) lancets standard; Use to test blood sugar 2 times daily or as directed.  - blood glucose (NO BRAND SPECIFIED) test strip; Use to test blood sugar 2 times daily or as directed.  - DULoxetine (CYMBALTA) 30 MG capsule; Take 1 capsule (30 mg) by mouth daily.  - insulin aspart (NOVOLOG PEN) 100 UNIT/ML pen; Inject 12 Units subcutaneously daily (with dinner).  - insulin glargine (LANTUS PEN) 100 UNIT/ML pen; Inject 10 Units subcutaneously every evening.  - insulin pen needle  (32G X 4 MM) 32G X 4 MM miscellaneous; Use 1 pen needles daily  - metFORMIN (GLUCOPHAGE XR) 500 MG 24 hr tablet; Take 1 tablet (500 mg) by mouth daily (with dinner).  - glucose (BD GLUCOSE) 4 g chewable tablet; Take 1 tablet by mouth every hour as needed for low blood sugar.    Hip fracture, right, closed, with routine healing, subsequent encounter  Hip fracture, right, closed, initial encounter (H)  - senna-docusate (SENOKOT-S/PERICOLACE) 8.6-50 MG tablet; Take 1 tablet by mouth daily as needed for constipation.  - acetaminophen (TYLENOL) 500 MG tablet; Take 2 tablets (1,000 mg) by mouth 3 times daily.    Insurance coverage problems  Patient reports receiving a bill from Medicare and wondering about coverage. They had applied for [employer] 3mo ago and now worried about coverage because they have gone to seen several specialists recently.   - Primary Care - Care Coordination Referral; Future    Tinnitus, left  Reports reduced hearing and ringing in L ear for 1mo. Melara test negative bilaterally. Ears clear, TM translucent and intact. No recent trauma.  -Follow-up for further eval of SN hearing loss.      Subjective   Ree is a 79 year old, presenting for the following health issues:  Travel visit  (Leaving 25th November to [facility]. Comeback Dec 25th. ) and Imm/Inj (Typhoid )        11/14/2024     2:23 PM   Additional Questions   Roomed by Jl   Accompanied by Daughter         11/14/2024   Forms   Any forms needing to be completed Yes          11/14/2024    Information    services provided? Yes   Chito Perdomo   Type of interpretation provided Face-to-face    name Maurice Cardoza    Agency [employer]      [facility]     Destination: [place] (city)  Depature: 11/25 Return: 12/25  Reason for travel: Visiting siblings, going with sons and daughter (total 5 people)       Condition Medication   T2DM w/neuropathy Metformin 500mg d  Lantus 10u qhs  Aspart 12u dinner  BG monitoring  "equipment - continuous glucose monitor, sensor, lancets  Duloxetine 30mg d   Glucose chews 4g tablets    Constipation Senna 1 tablet d     Routine Vaccines:  -Flu - reports she got it at the \"[employer] in Sept 2024  -COVID - declined, will come back   -Zoster - pharmacy      Required vaccines:  -Yellow fever not required      Recommended vaccines:  -HepA - completed 2/12/2008  -HepB - completed series 2008  -MMR - born before 1959  -Typhoid  -Rabies - need to go to travel clinic/cub  -Maltese encephalitis - need to go to travel clinic/cub  -Malaria ppx: Atovaquone-proguanil, doxycycline OK here     Diabetes   Insulin? Not sure. Taking it once per day.   Metformin - has been taking             Mood - okay. Excited to go and be away from cold. No SI/HI.     Collection bill from Medicare - they have [employer]  Reapplied 3mo, hasn't heard back.   Has been to other clinics.     Tinnitus   No pain, no vertigo/lightheadedness  Has had harder hearing - raised voices   1mo    Review of Systems  Constitutional, HEENT, cardiovascular, pulmonary, gi and gu systems are negative, except as otherwise noted.      Objective    /80 (BP Location: Right arm, Patient Position: Sitting)   Pulse 98   Temp 98.2  F (36.8  C) (Oral)   Resp 18   Ht 1.448 m (4' 8.99\")   Wt 53.1 kg (117 lb)   SpO2 94%   BMI 25.33 kg/m    Body mass index is 25.33 kg/m .    Physical Exam   GENERAL: Active, alert, in no acute distress.  EARS: Clear, TM intact, translucent.   LUNGS: Normal WOB, no respiratory distress  HEART: Appears well-perfused.  MSK: ROM of all 4 extremities grossly intact, no BLE edema on gross examination   SKIN: No obvious lesions on gross examination  NEUROLOGIC: Normal tone throughout. Normal reflexes for age  PSYCH: Mood appropriate, normal affect        Answers submitted by the patient for this visit:  Patient Health Questionnaire (Submitted on 11/14/2024)  If you checked off any problems, how difficult have these problems " made it for you to do your work, take care of things at home, or get along with other people?: Not difficult at all  PHQ9 TOTAL SCORE: 8          Signed Electronically by: Stacey Morgan MD

## 2024-11-18 ENCOUNTER — APPOINTMENT (OUTPATIENT)
Dept: INTERPRETER SERVICES | Facility: CLINIC | Age: 79
End: 2024-11-18
Payer: COMMERCIAL

## 2024-11-18 ENCOUNTER — PATIENT OUTREACH (OUTPATIENT)
Dept: CARE COORDINATION | Facility: CLINIC | Age: 79
End: 2024-11-18
Payer: COMMERCIAL

## 2024-11-18 NOTE — PROGRESS NOTES
FRW UPDATE :  11/18/24 - Established patient outreach attempted x 1 was unable to reach. Left message on voicemail with call back information and requested return call.  Plan: Current outreach date reflects FRW 's follow up within 30 days.

## 2024-11-21 ENCOUNTER — OFFICE VISIT (OUTPATIENT)
Dept: FAMILY MEDICINE | Facility: CLINIC | Age: 79
End: 2024-11-21
Payer: COMMERCIAL

## 2024-11-21 VITALS
OXYGEN SATURATION: 94 % | RESPIRATION RATE: 18 BRPM | WEIGHT: 118.04 LBS | DIASTOLIC BLOOD PRESSURE: 76 MMHG | BODY MASS INDEX: 25.46 KG/M2 | TEMPERATURE: 98.7 F | HEART RATE: 101 BPM | HEIGHT: 57 IN | SYSTOLIC BLOOD PRESSURE: 134 MMHG

## 2024-11-21 DIAGNOSIS — E11.51 TYPE II DIABETES MELLITUS WITH PERIPHERAL CIRCULATORY DISORDER (H): Primary | ICD-10-CM

## 2024-11-21 NOTE — PROGRESS NOTES
Faculty Supervision of Residents   I have examined this patient and the medical care has been evaluated and discussed with the resident. See resident note outlining our discussion. Agree with plan to revamp DM regimen post trip    Myla Prado MD

## 2024-11-21 NOTE — NURSING NOTE
Writer met with patient and the patient's daughter for novolog and lantus injection teaching. Writer discussed mechanism of action for each medication. Writer provided demonstration for administration along with appropriate administration sites, storage,and disposal of needles. The patient's daughter will be giving the patient the medication. The patient's daughter demonstrated understanding x2 with injecting lantus using a demo pad and pen. She stated they had not yet received the novolog from the pharmacy. She had no further questions or concerns, the patient had no questions or concerns either. Writer provided verbal handoff to  regarding patient without her novolog. Carlos ANDREA

## 2024-11-21 NOTE — PROGRESS NOTES
Assessment & Plan     Type II diabetes mellitus with peripheral circulatory disorder (H)  Following up on insulin regimen. Has not used yet and not monitoring BG. Presents w/different daughter who has now assumed caretaker role and doesn't know how to use insulin. Fortunately, she has been asymptomatic.   -RN Bunny assisting w/insulin teaching  -Ideally would follow-up in 2wks for BG monitoring, but will be traveling to [facility] for 1mo at the end of the week. Will plan to hold off on insulin until she returns and discuss GOC and adjust diabetes regimen to best suit her goals. Can be flexible w/A1c goal < 8.5.    -Consider GLP1? And/or SGLT2  -Resume metformin 500mg d w/dinner  -Counseled on monitoring and managing hypo- and hyperglycemia - patient and daughter expressed understanding and agreeable. Discussed if she's feeling unwell and BG >300 to go to local hospital.       Subjective   Ree is a 79 year old, presenting for the following health issues:  RECHECK (Diabetes follow up. No concern. ) and Imm/Inj (Pt decline vaccines.)        11/21/2024     2:23 PM   Additional Questions   Roomed by Hser   Accompanied by Daughter         11/21/2024    Information    services provided? Yes   Language Leanne   Type of interpretation provided Face-to-face    name Maurice Cardoza    Agency [employer]     Last seen 11/14/2024 for travel visit, had not been adherent w/insulin regimen.     DM follow up  - Last A1c: 10.6  -Current medications: Lantus 10u at bedtime, aspart 12u w/dinner, metformin 500mg d, cymbalta for nueropathy.    Any problems/side effects after medication change: Not taking, doesn't know   - BG values: CGM - none   - Hypoglycemia sx: Denies headaches, lightheadedness, confusion, diaphoresis, tremor, palpitations  - Hyperglycemia sx: Denies fever/chills, polyuria, polydipsia, polyphagia, abdominal pain          Review of Systems  Constitutional, HEENT,  "cardiovascular, pulmonary, gi and gu systems are negative, except as otherwise noted.      Objective    /76   Pulse 101   Temp 98.7  F (37.1  C) (Oral)   Resp 18   Ht 1.455 m (4' 9.28\")   Wt 53.5 kg (118 lb 0.6 oz)   SpO2 94%   BMI 25.29 kg/m    Body mass index is 25.29 kg/m .    Physical Exam   GENERAL: Active, alert, in no acute distress.  LUNGS: Normal WOB, no respiratory distress  HEART: Appears well-perfused.  MSK: ROM of all 4 extremities grossly intact, no BLE edema on gross examination   SKIN: No obvious lesions on gross examination  NEUROLOGIC: Normal tone throughout. Normal reflexes for age  PSYCH: Mood appropriate, normal affect          Signed Electronically by: Stacey Morgan MD    "

## 2024-11-25 ENCOUNTER — PATIENT OUTREACH (OUTPATIENT)
Dept: CARE COORDINATION | Facility: CLINIC | Age: 79
End: 2024-11-25
Payer: COMMERCIAL

## 2024-11-25 NOTE — PROGRESS NOTES
FRW UPDATE :  11/25/24 - Established outreach attempted x 2. Left message on voicemail indicating last outreach attempt.   Plan: FRW closed the FRW program, FAM Case, FAM Tracker and will send an unreachable letter. Patient can be referred back to FRW if needed.

## 2024-11-25 NOTE — LETTER
[facility] CARE COORDINATION        November 25, 2024      Claudia May  [address]  [place] [address]        Dear Claudia,                                                                      The Financial Resource team has attempted to reach to you to assist you with any financial barriers you may be facing in your healthcare journey.  We would like to provide any additional support you may need related to financial support for your healthcare.  Our team are Certified MNSure Navigators, and we offer support with application assistance for Medical Assistance, MNSure Applications, Energy Assistance, Emergency Assistance, Food Assistance and many other initial applications for [facility] supported benefits.     I would appreciate if you would call me at [phone] to let me know if you would like assistance.      Sincerely,                                                                         Chapis White  Financial Resource Worker  [facility] Care Coordination  [phone]

## 2025-01-10 ENCOUNTER — OFFICE VISIT (OUTPATIENT)
Dept: FAMILY MEDICINE | Facility: CLINIC | Age: 80
End: 2025-01-10
Payer: COMMERCIAL

## 2025-01-10 VITALS
SYSTOLIC BLOOD PRESSURE: 122 MMHG | TEMPERATURE: 98.9 F | HEIGHT: 57 IN | BODY MASS INDEX: 25.24 KG/M2 | WEIGHT: 117 LBS | OXYGEN SATURATION: 93 % | RESPIRATION RATE: 20 BRPM | HEART RATE: 104 BPM | DIASTOLIC BLOOD PRESSURE: 62 MMHG

## 2025-01-10 DIAGNOSIS — S72.001D HIP FRACTURE, RIGHT, CLOSED, WITH ROUTINE HEALING, SUBSEQUENT ENCOUNTER: ICD-10-CM

## 2025-01-10 DIAGNOSIS — E11.43 TYPE 2 DIABETES MELLITUS WITH DIABETIC AUTONOMIC NEUROPATHY, WITH LONG-TERM CURRENT USE OF INSULIN (H): Primary | ICD-10-CM

## 2025-01-10 DIAGNOSIS — Z23 NEED FOR VACCINATION: ICD-10-CM

## 2025-01-10 DIAGNOSIS — Z79.4 TYPE 2 DIABETES MELLITUS WITH DIABETIC AUTONOMIC NEUROPATHY, WITH LONG-TERM CURRENT USE OF INSULIN (H): Primary | ICD-10-CM

## 2025-01-10 LAB
CREAT UR-MCNC: 64.3 MG/DL
EST. AVERAGE GLUCOSE BLD GHB EST-MCNC: 246 MG/DL
HBA1C MFR BLD: 10.2 % (ref 0–5.6)
MICROALBUMIN UR-MCNC: 91.1 MG/L
MICROALBUMIN/CREAT UR: 141.68 MG/G CR (ref 0–25)

## 2025-01-10 RX ORDER — METFORMIN HYDROCHLORIDE 500 MG/1
1000 TABLET, EXTENDED RELEASE ORAL
Qty: 180 TABLET | Refills: 3 | Status: SHIPPED | OUTPATIENT
Start: 2025-01-10 | End: 2025-01-10

## 2025-01-10 RX ORDER — ATORVASTATIN CALCIUM 20 MG/1
20 TABLET, FILM COATED ORAL EVERY EVENING
Qty: 90 TABLET | Refills: 3 | Status: SHIPPED | OUTPATIENT
Start: 2025-01-10

## 2025-01-10 RX ORDER — METFORMIN HYDROCHLORIDE 500 MG/1
1000 TABLET, EXTENDED RELEASE ORAL 2 TIMES DAILY WITH MEALS
Qty: 360 TABLET | Refills: 3 | Status: SHIPPED | OUTPATIENT
Start: 2025-01-10

## 2025-01-10 RX ORDER — ACETAMINOPHEN 500 MG
1000 TABLET ORAL 3 TIMES DAILY
Qty: 180 TABLET | Refills: 0 | Status: SHIPPED | OUTPATIENT
Start: 2025-01-10

## 2025-01-10 NOTE — PATIENT INSTRUCTIONS
Please take 10 units of Lantus once daily before bed.    Inject 12 units of Novolog with dinner. This medicine should be injected no more than 5-10 minutes before meals.     Here is a suggestion for how to fill your pillbox:     Morning:   Metformin 2 tablets    Evening:  Metformin 2 tablets  Duloxetine 1 capsule  Atorvastatin 1 tablet    You need two vaccines - RSV and Shingles. Your insurance will cover these injections at your pharmacy.

## 2025-01-10 NOTE — PROGRESS NOTES
Assessment & Plan     Type 2 diabetes mellitus with diabetic autonomic neuropathy, with long-term current use of insulin (H)  A1c 10.2, not within goal < 8.5. Home fasting BG 140s - 200s, postprandials as high as 400s. Asymptomatic. Patient reports taking 2 tablets of metformin with lunch and then dinner. Ideally, would add on GLP1i and insulin, but patient was very hesitant about 2 injectables. Discussed risks/benefits of GLP1i - patient opted to just do metformin and insulin at this time. Would not be candidate for jardiance given urinary incontinence.   -Insulin regimen:   -Lantus 12u at bedtime    -Aspart 12u w/dinner    -Unclear why she was not on meal-time insulin for lunch. She was transferred from another PCP on this regimen. Will continue with this as she and daughter are most familiar with this. Follow-up in 2wks - will adjust as appropriate.   -Continue metformin 1000mg BID PO  - Albumin Random Urine Quantitative with Creat Ratio  - Lipid panel  - BASIC METABOLIC PANEL; Future  - BASIC METABOLIC PANEL  - Incontinence Supplies Order  - blood glucose (NO BRAND SPECIFIED) test strip; Use to test blood sugar 2 times daily or as directed.  - blood glucose (NO BRAND SPECIFIED) lancets standard; Use to test blood sugar 2 times daily or as directed.  - insulin pen needle (32G X 4 MM) 32G X 4 MM miscellaneous; Use 1 pen needles daily  - atorvastatin (LIPITOR) 20 MG tablet; Take 1 tablet (20 mg) by mouth every evening.    Hip fracture, right, closed, with routine healing, subsequent encounter  - acetaminophen (TYLENOL) 500 MG tablet; Take 2 tablets (1,000 mg) by mouth 3 times daily.    Need for vaccination  -RSV and shingles. Discussed w/PharmD going to local pharmacy for these as insurance will cover there.       Subjective   Ree is a 80 year old, presenting for the following health issues:  Diabetes and Refill Request        1/10/2025     2:33 PM   Additional Questions   Roomed by yudelka   Accompanied by  "deya         1/10/2025    Information    services provided? Yes   Language Leanne    name Maurice    Agency [employer]     DM follow up  - Last seen 11/21/2024  - Last A1c: 10.6 in Nov 2024  -Current medications: Metformin 500mg BID. Supposed to be on lantus 10u at bedtime and aspart 12u w/dinner - held off on restarting d/t travel to [facility]   Any problems/side effects after medication change: No.   - BG values: Doesn't like Luisito. Does fasting checks 140s - 200s. Sometimes, 400s after eating.   - Hypoglycemia sx: Denies headaches, lightheadedness, confusion, diaphoresis, tremor, palpitations  - Hyperglycemia sx: Denies fever/chills, polyuria, polydipsia, polyphagia, abdominal pain    Review of Systems  Constitutional, HEENT, cardiovascular, pulmonary, gi and gu systems are negative, except as otherwise noted.      Objective    /62   Pulse 104   Temp 98.9  F (37.2  C) (Oral)   Resp 20   Ht 1.45 m (4' 9.09\")   Wt 53.1 kg (117 lb)   SpO2 93%   BMI 25.24 kg/m    Body mass index is 25.24 kg/m .  Physical Exam     GENERAL: Active, alert, in no acute distress.  LUNGS: Normal WOB, no respiratory distress  HEART: Appears well-perfused.  MSK: ROM of all 4 extremities grossly intact, no BLE edema on gross examination   SKIN: No obvious lesions on gross examination  NEUROLOGIC: Normal tone throughout. Normal reflexes for age  PSYCH: Mood appropriate, normal affect      Signed Electronically by: Stacey Morgan MD    DME (Durable Medical Equipment) Orders and Documentation  Orders Placed This Encounter   Procedures    Incontinence Supplies Order        The patient was assessed and it was determined the patient is in need of the following listed DME Supplies/Equipment. Please complete supporting documentation below to demonstrate medical necessity.         "

## 2025-01-11 LAB
ANION GAP SERPL CALCULATED.3IONS-SCNC: 14 MMOL/L (ref 7–15)
BUN SERPL-MCNC: 13.2 MG/DL (ref 8–23)
CALCIUM SERPL-MCNC: 9.2 MG/DL (ref 8.8–10.4)
CHLORIDE SERPL-SCNC: 94 MMOL/L (ref 98–107)
CHOLEST SERPL-MCNC: 249 MG/DL
CREAT SERPL-MCNC: 1 MG/DL (ref 0.51–0.95)
EGFRCR SERPLBLD CKD-EPI 2021: 56 ML/MIN/1.73M2
FASTING STATUS PATIENT QL REPORTED: NO
FASTING STATUS PATIENT QL REPORTED: NO
GLUCOSE SERPL-MCNC: 393 MG/DL (ref 70–99)
HCO3 SERPL-SCNC: 27 MMOL/L (ref 22–29)
HDLC SERPL-MCNC: 38 MG/DL
LDLC SERPL CALC-MCNC: 160 MG/DL
NONHDLC SERPL-MCNC: 211 MG/DL
POTASSIUM SERPL-SCNC: 3.9 MMOL/L (ref 3.4–5.3)
SODIUM SERPL-SCNC: 135 MMOL/L (ref 135–145)
TRIGL SERPL-MCNC: 253 MG/DL

## 2025-01-14 ENCOUNTER — TELEPHONE (OUTPATIENT)
Dept: FAMILY MEDICINE | Facility: CLINIC | Age: 80
End: 2025-01-14
Payer: COMMERCIAL

## 2025-01-14 NOTE — TELEPHONE ENCOUNTER
I called and wanted to confirm if they were able to  Dme order, diapers.     Was Faxed over to [facility] at [address] [address].    Was faxed over on Friday 1/10/25.     Daughter is going to call and confirm if they  supplies or not

## 2025-01-24 ENCOUNTER — OFFICE VISIT (OUTPATIENT)
Dept: FAMILY MEDICINE | Facility: CLINIC | Age: 80
End: 2025-01-24
Payer: COMMERCIAL

## 2025-01-24 VITALS
HEIGHT: 57 IN | RESPIRATION RATE: 18 BRPM | SYSTOLIC BLOOD PRESSURE: 134 MMHG | DIASTOLIC BLOOD PRESSURE: 83 MMHG | WEIGHT: 117 LBS | BODY MASS INDEX: 25.24 KG/M2 | OXYGEN SATURATION: 96 % | HEART RATE: 80 BPM | TEMPERATURE: 98.1 F

## 2025-01-24 DIAGNOSIS — E11.43 TYPE 2 DIABETES MELLITUS WITH DIABETIC AUTONOMIC NEUROPATHY, WITH LONG-TERM CURRENT USE OF INSULIN (H): Primary | ICD-10-CM

## 2025-01-24 DIAGNOSIS — Z79.4 TYPE 2 DIABETES MELLITUS WITH DIABETIC AUTONOMIC NEUROPATHY, WITH LONG-TERM CURRENT USE OF INSULIN (H): Primary | ICD-10-CM

## 2025-01-24 DIAGNOSIS — F33.9 RECURRENT MAJOR DEPRESSIVE DISORDER, REMISSION STATUS UNSPECIFIED: ICD-10-CM

## 2025-01-24 DIAGNOSIS — F03.B0 MODERATE DEMENTIA WITHOUT BEHAVIORAL DISTURBANCE, PSYCHOTIC DISTURBANCE, MOOD DISTURBANCE, OR ANXIETY, UNSPECIFIED DEMENTIA TYPE (H): ICD-10-CM

## 2025-01-24 RX ORDER — DULOXETIN HYDROCHLORIDE 30 MG/1
30 CAPSULE, DELAYED RELEASE ORAL DAILY
Qty: 90 CAPSULE | Refills: 0 | Status: SHIPPED | OUTPATIENT
Start: 2025-01-24

## 2025-01-24 NOTE — PROGRESS NOTES
I have personally reviewed the history and examination as documented by Dr. Morgan.  I was present during key portions of the visit and agree with the assessment and plan as documented for 80 yr old female with DM, depression here for follow-up. Has not restarted DM meds including her insulin. Concern for dementia. Will refer for more formal eval of memory. Precautions given. Anticipatory guidance given.     Diony Ortega MD  January 24, 2025  11:23 AM

## 2025-01-24 NOTE — PROGRESS NOTES
Assessment & Plan     Type 2 diabetes mellitus with diabetic autonomic neuropathy, with long-term current use of insulin (H)  Unfortunately has not yet started insulin regimen - threw away old pens b/c they were left out when she left for [place], unable to  new ones yet d/t insurance coverage. Asymptomatic, reportedly BG's in high 100s-200s - daughter doesn't remember if these are fasting or postprandial.   -Lantus 12u at bedtime, aspart 12u w/dinner   -Recommended checking at least FBG and recording. They will bring to next visit.   -Follow-up in 4wks    Moderate dementia without behavioral disturbance, psychotic disturbance, mood disturbance, or anxiety, unspecified dementia type (H)  Recurrent major depressive disorder, remission status unspecified  Patient w/very flat affect and minimally interactive during visit. She has been very ambivalent, non-conversant, and easily irritable during my previous encounters with her as well, daughter doing most of the talking for her. Currently, lives w/daughter who is also primary caretaker. This has apparently been her baseline for several years - daughter did not seem concerned. When asked about what would bring her sterling/comfort in life, patient did not answer, daughter is not sure. Per chart rvw, there are several notes of dementia and cognitive disorder, though no formal evaluation.   - Would benefit from further eval w/geriatrician w/Dr. Rosenstein. Follow-up in 2wks.   - DULoxetine (CYMBALTA) 30 MG capsule; Take 1 capsule (30 mg) by mouth daily.      Subjective   Ree is a 80 year old, presenting for the following health issues:  Diabetes (Follow-up) and Medication Request (Depression medication)        1/24/2025    10:48 AM   Additional Questions   Roomed by Jl   Accompanied by Daughter         1/24/2025    Information    services provided? Yes   Chito Perdomo   Type of interpretation provided Face-to-face    name Saw Mon  "   Agency [employer]     [facility]     DM follow up  Last seen 1/10/2025 co-visit w/myself and PharmD KMS  - Last A1c: 10.2 in Jan 2025  -Current medications: Lantus 12u at bedtime, aspart 12u w/dinner, metformin 1000mg BID PO   Any problems/side effects after medication change: Not taking - unable to pick it up until today.   - BG values: 100s - 200s   - Hypoglycemia sx: Denies headaches, lightheadedness, confusion, diaphoresis, tremor, palpitations  - Hyperglycemia sx: Denies fever/chills, polyuria, polydipsia, polyphagia, abdominal pain    -Preventative measures:  - Last Ophthalmology visit: NA  - Last Urine Microalbumin: 141 - elevated in Jan 2025  - Preventative Meds:    ASA - No    ACEI/ARB - no, normotensive     Statin - atorvastatin 20mg d    Special ?    MDD- requesting refill on duloxetine - helpful.     Review of Systems  Constitutional, HEENT, cardiovascular, pulmonary, gi and gu systems are negative, except as otherwise noted.      Objective    /83 (BP Location: Left arm, Patient Position: Sitting)   Pulse 80   Temp 98.1  F (36.7  C) (Oral)   Resp 18   Ht 1.455 m (4' 9.28\")   Wt 53.1 kg (117 lb)   SpO2 96%   BMI 25.07 kg/m    Body mass index is 25.07 kg/m .  Physical Exam   GENERAL: Active, alert, in no acute distress, non-conversant, does nod and maintain eye contact.   LUNGS: Normal WOB, no respiratory distress  HEART: Appears well-perfused.  MSK: ROM of all 4 extremities grossly intact, no BLE edema on gross examination   SKIN: No obvious lesions on gross examination  NEUROLOGIC: Alert, oriented x4, no focal deficits. Ambulates w/1-point cane. Normal tone throughout. Normal reflexes for age  PSYCH: Flat affect.          Signed Electronically by: Stacey Morgan MD    "

## 2025-02-10 ENCOUNTER — TELEPHONE (OUTPATIENT)
Dept: FAMILY MEDICINE | Facility: CLINIC | Age: 80
End: 2025-02-10
Payer: COMMERCIAL

## 2025-02-12 NOTE — TELEPHONE ENCOUNTER
Forms/Letter Request    Type of form/letter: DME (wheelchair, hospital bed)    Type of DME requested:     - k0003 Rental w/c lightweight 16x16  -  Rental w/c accessory adjustable arm rest  -  Rental w/c accessory adjustable arm rest  -  Rental w/c accessory anti tipper  -  Rental w/c accessory anti tipper  - 0978 Rental w/c accessory seatbelt    Do we have the form/letter: Yes: Standard Written Order : [employer]    Who is the form from? [facility] [employer]    Where did/will the form come from? form was faxed in    When is form/letter needed by: asap    How would you like the form/letter returned: Fax : [phone]    Patient Notified form requests are processed in 5-7 business days:No    Okay to leave a detailed message?: No    
Faxed 2/12 [id number]  
Placed form on provider's desk.  
1.2

## 2025-02-21 ENCOUNTER — TRANSFERRED RECORDS (OUTPATIENT)
Dept: MULTI SPECIALTY CLINIC | Facility: CLINIC | Age: 80
End: 2025-02-21
Payer: COMMERCIAL

## 2025-02-21 LAB — RETINOPATHY: NORMAL

## 2025-02-25 ENCOUNTER — TELEPHONE (OUTPATIENT)
Dept: FAMILY MEDICINE | Facility: CLINIC | Age: 80
End: 2025-02-25

## 2025-02-25 NOTE — TELEPHONE ENCOUNTER
"  Forms/Letter Request    Type of form/letter: DME (wheelchair, hospital bed)    Type of DME requested:   - QTY 1  Commode STD 18\" 300LB CAP    Do we have the form/letter: Yes: SANDHYA COOK    Who is the form from? [facility]    Where did/will the form come from? form was faxed in    When is form/letter needed by: ASAP    How would you like the form/letter returned: Fax : [phone]    Patient Notified form requests are processed in 5-7 business days:No    Okay to leave a detailed message?: No    "

## 2025-03-11 ENCOUNTER — OFFICE VISIT (OUTPATIENT)
Dept: FAMILY MEDICINE | Facility: CLINIC | Age: 80
End: 2025-03-11
Payer: COMMERCIAL

## 2025-03-11 VITALS
BODY MASS INDEX: 24.14 KG/M2 | TEMPERATURE: 97.8 F | HEART RATE: 81 BPM | WEIGHT: 115 LBS | DIASTOLIC BLOOD PRESSURE: 78 MMHG | HEIGHT: 58 IN | SYSTOLIC BLOOD PRESSURE: 148 MMHG | RESPIRATION RATE: 20 BRPM | OXYGEN SATURATION: 95 %

## 2025-03-11 DIAGNOSIS — Z79.4 TYPE 2 DIABETES MELLITUS WITH DIABETIC AUTONOMIC NEUROPATHY, WITH LONG-TERM CURRENT USE OF INSULIN (H): Primary | ICD-10-CM

## 2025-03-11 DIAGNOSIS — E11.43 TYPE 2 DIABETES MELLITUS WITH DIABETIC AUTONOMIC NEUROPATHY, WITH LONG-TERM CURRENT USE OF INSULIN (H): Primary | ICD-10-CM

## 2025-03-11 DIAGNOSIS — R03.0 ELEVATED BLOOD PRESSURE READING WITHOUT DIAGNOSIS OF HYPERTENSION: ICD-10-CM

## 2025-03-11 DIAGNOSIS — R26.89 IMPAIRED GAIT AND MOBILITY: ICD-10-CM

## 2025-03-11 RX ORDER — METFORMIN HYDROCHLORIDE 500 MG/1
1000 TABLET, EXTENDED RELEASE ORAL 2 TIMES DAILY WITH MEALS
Qty: 360 TABLET | Refills: 3 | Status: SHIPPED | OUTPATIENT
Start: 2025-03-11

## 2025-03-11 NOTE — PROGRESS NOTES
Assessment & Plan     (E11.43,  Z79.4) Type 2 diabetes mellitus with diabetic autonomic neuropathy, with long-term current use of insulin (H)  (primary encounter diagnosis)  -Lantus 12 >> 14u at bedtime   -Metformin refill  -Follow-up in 2wks    (R26.89) Impaired gait and mobility  Clarified DME commode order. Will finish paperwork and fax to [facility].     (R03.0) Elevated blood pressure reading without diagnosis of hypertension  BP > 140/90 today. Asymptomatic. No previous diagnosis.   -Follow-up in 2wks         Subjective   Ree is a 80 year old, presenting for the following health issues:  Diabetes        3/11/2025    10:07 AM   Additional Questions   Roomed by Jl   Accompanied by Daughter         3/11/2025    Information    services provided? Yes   Chito Perdomo   Type of interpretation provided Face-to-face    name Maurice    Agency [employer]      DM follow up   Last A1c: 10.2 January 2025  -Current medications: Lantus 12 units nightly, aspart 15 units with dinner              Any problems/side effects after medication change: No  - BG values: 200s - 300s  postprandial, fastings 160s - 180s.   - Hypoglycemia sx: Denies headaches, lightheadedness, confusion, diaphoresis, tremor, palpitations  - Hyperglycemia sx: Fatigue. Denies fever/chills, polyuria, polydipsia, polyphagia, abdominal pain    2/25/2025 note  Type 2 diabetes mellitus with diabetic autonomic neuropathy, with long-term current use of insulin (H)  Postprandial BG > 160s consistently, typically in the 250s - 280s on review of monitor. Not recording fastings. She eats 1-2x/d, her main meal being dinner. Snacks frequently. No hyper/hypoglycemic sx.   - Aspart 12u >> 15u w/dinner  -Counseled on recording BG - fasting and 1h postprandial rather than 30min.   -Follow-up in 2wks - will likely need to titrate up lantus.       Review of Systems  Constitutional, HEENT, cardiovascular, pulmonary, gi and gu  "systems are negative, except as otherwise noted.      Objective    BP (!) 148/78 (BP Location: Right arm, Patient Position: Sitting)   Pulse 81   Temp 97.8  F (36.6  C) (Oral)   Resp 20   Ht 1.465 m (4' 9.68\")   Wt 52.2 kg (115 lb)   SpO2 95%   BMI 24.30 kg/m    Body mass index is 24.3 kg/m .  Physical Exam   GENERAL: Active, alert, in no acute distress.  LUNGS: Normal WOB, no respiratory distress  HEART: Appears well-perfused.  MSK: ROM of all 4 extremities grossly intact, no BLE edema on gross examination   SKIN: No obvious lesions on gross examination  NEUROLOGIC: Normal tone throughout. Normal reflexes for age  PSYCH: Mood appropriate, normal affect          Signed Electronically by: Stacey Morgan MD    "

## 2025-03-11 NOTE — PROGRESS NOTES
Preceptor Attestation:   Patient seen, evaluated and discussed with the resident. I have verified the content of the note, which accurately reflects my assessment of the patient and the plan of care.    Supervising Physician:Sofia Kirk MD    [facility]

## 2025-03-12 NOTE — TELEPHONE ENCOUNTER
Spoke with Dr. Morgan. Advise it is fill out. I threw away duplicate. Forms should be going to where it need to be later on the day

## 2025-03-25 ENCOUNTER — OFFICE VISIT (OUTPATIENT)
Dept: FAMILY MEDICINE | Facility: CLINIC | Age: 80
End: 2025-03-25
Payer: COMMERCIAL

## 2025-03-25 VITALS
OXYGEN SATURATION: 95 % | RESPIRATION RATE: 18 BRPM | BODY MASS INDEX: 24.14 KG/M2 | DIASTOLIC BLOOD PRESSURE: 73 MMHG | SYSTOLIC BLOOD PRESSURE: 130 MMHG | HEART RATE: 81 BPM | WEIGHT: 115 LBS | HEIGHT: 58 IN | TEMPERATURE: 97.5 F

## 2025-03-25 DIAGNOSIS — E11.43 TYPE 2 DIABETES MELLITUS WITH DIABETIC AUTONOMIC NEUROPATHY, WITH LONG-TERM CURRENT USE OF INSULIN (H): Primary | ICD-10-CM

## 2025-03-25 DIAGNOSIS — H04.123 DRY EYES: ICD-10-CM

## 2025-03-25 DIAGNOSIS — R03.0 ELEVATED BLOOD PRESSURE READING WITHOUT DIAGNOSIS OF HYPERTENSION: ICD-10-CM

## 2025-03-25 DIAGNOSIS — Z79.4 TYPE 2 DIABETES MELLITUS WITH DIABETIC AUTONOMIC NEUROPATHY, WITH LONG-TERM CURRENT USE OF INSULIN (H): Primary | ICD-10-CM

## 2025-03-25 LAB
EST. AVERAGE GLUCOSE BLD GHB EST-MCNC: 209 MG/DL
HBA1C MFR BLD: 8.9 % (ref 0–5.6)

## 2025-03-25 RX ORDER — CARBOXYMETHYLCELLULOSE SODIUM 5 MG/ML
1 SOLUTION/ DROPS OPHTHALMIC DAILY PRN
Qty: 30 ML | Refills: 1 | Status: SHIPPED | OUTPATIENT
Start: 2025-03-25

## 2025-03-25 NOTE — PROGRESS NOTES
Assessment & Plan     Type 2 diabetes mellitus with diabetic autonomic neuropathy, with long-term current use of insulin (H)  A1c 8.9, nearing goal < 8.5. FBG ~160s - 170s. Not tracking PP BG appropriately - she is worried she will forget if she waits 1-2h after.    - Lantus 14 >> 16u at bedtime, continue aspart 15u w/dinner.   - Follow-up in 2wks    Elevated blood pressure reading without diagnosis of hypertension  /77 and 130/73 on repeat. Elevated frequently at past visits. Asymptomatic.   -Risk/benefit discussion w/patient. She would prefer to lessen her pill burden at her age, which is reasonable.   -Will continue to monitor. If patient agreeable to antihypertensives in the future, consider starting low-dose ACEi/ARB.      Dry eyes  - carboxymethylcellulose (CARBOXYMETHYLCELLULOSE SODIUM) 0.5 % SOLN ophthalmic solution; Place 1 drop into both eyes daily as needed for dry eyes.                No follow-ups on file.    Subjective   Ree is a 80 year old, presenting for the following health issues:  Hypertension and Medication Request (Eye Drops )        3/25/2025    10:03 AM   Additional Questions   Roomed by Jl   Accompanied by Daughter         3/25/2025    Information    services provided? Yes   Chito Perdomo   Type of interpretation provided Face-to-face    name Maurice Alleng    [employer]      Hypertension  Last seen on 3/11/25    BP Readings from Last 6 Encounters:   03/25/25 130/73   03/11/25 (!) 148/78   02/25/25 135/77   01/24/25 134/83   01/10/25 122/62   01/10/25 122/62       Current med regimen: None   Outpatient pressures: Doesn't check.   Chest Pain: No  Dyspnea/DICKSON: No  Headache/changes in vision: No     DM follow up   Last A1c: 10.2 January 2025  -Current medications: Lantus 14 units nightly, aspart 15 units with dinner              Any problems/side effects after medication change: No  - BG values: AM sugars down to 160s? Checks PP  "BG right after she eats - can get as high as 300s. Cannot wait 2h after eating to check or she'll forget. Does get as low as 80.   - Hypoglycemia sx: Denies headaches, lightheadedness, confusion, diaphoresis, tremor, palpitations  - Hyperglycemia sx: Denies fever/chills, polyuria, polydipsia, polyphagia, abdominal pain  -\"appetite too good\" - likes to eat big dinners and sweet snacks.       Review of Systems  Constitutional, HEENT, cardiovascular, pulmonary, gi and gu systems are negative, except as otherwise noted.      Objective    /73 (BP Location: Right arm)   Pulse 81   Temp 97.5  F (36.4  C) (Oral)   Resp 18   Ht 1.465 m (4' 9.68\")   Wt 52.2 kg (115 lb)   SpO2 95%   BMI 24.30 kg/m    Body mass index is 24.3 kg/m .  Physical Exam   GENERAL: Active, alert, in no acute distress.  LUNGS: Normal WOB, no respiratory distress  HEART: Appears well-perfused.  MSK: ROM of all 4 extremities grossly intact, no BLE edema on gross examination   SKIN: No obvious lesions on gross examination  NEUROLOGIC: Normal tone throughout. Normal reflexes for age  PSYCH: Mood appropriate, normal affect              Signed Electronically by: Stacey Morgan MD    "

## 2025-04-14 NOTE — PROGRESS NOTES
Assessment & Plan     Type 2 diabetes mellitus with diabetic autonomic neuropathy, with long-term current use of insulin (H)  Tolerating increase in lantus 16u at bedtime.   - Shared decision to resume lantus 16u at bedtime and aspart 15u w/dinner until next A1c recheck. If at goal A1c < 8.5, okay to remain on this regimen. Patient open to starting second agent (could consider oral GLP1? Otherwise, could be candidate for DDP4, TZD. Sulfonuyrea - more hesitant given hypoglycemia)  - blood glucose (NO BRAND SPECIFIED) lancets standard; Use to test blood sugar 2 times daily or as directed.  - blood glucose (NO BRAND SPECIFIED) test strip; Use to test blood sugar 2 times daily or as directed.  - insulin pen needle (32G X 4 MM) 32G X 4 MM miscellaneous; Use 1 pen needles daily  - Isopropyl Alcohol (ALCOHOL WIPES) 70 % MISC; Externally apply 1 each topically daily.  - metFORMIN (GLUCOPHAGE XR) 500 MG 24 hr tablet; Take 2 tablets (1,000 mg) by mouth 2 times daily (with meals).  - atorvastatin (LIPITOR) 20 MG tablet; Take 1 tablet (20 mg) by mouth every evening.  - insulin glargine (LANTUS PEN) 100 UNIT/ML pen; Inject 16 Units subcutaneously at bedtime.  - insulin aspart (NOVOLOG PEN) 100 UNIT/ML pen; Inject 15 Units subcutaneously daily (with dinner).    Dry eyes  - carboxymethylcellulose (CARBOXYMETHYLCELLULOSE SODIUM) 0.5 % SOLN ophthalmic solution; Place 1 drop into both eyes daily as needed for dry eyes.          Return in about 3 months (around 7/15/2025) for Follow up on diabetes.    The longitudinal plan of care for the diagnosis(es)/condition(s) as documented were addressed during this visit. Due to the added complexity in care, I will continue to support Ree in the subsequent management and with ongoing continuity of care.      Subjective   Ree is a 80 year old, presenting for the following health issues:  Diabetes and Refill Request (Dm medication )        4/15/2025     9:47 AM   Additional Questions   Roomed  "by Kristal anand   Accompanied by Daughter         4/15/2025    Information    services provided? Yes   Language Leanne   Type of interpretation provided Face-to-face    name Maurice    [employer]    phone number [phone]     \[facility] [place] [facility]\""      DM follow up  - Last seen 3/25/25  - Last A1c: 8.9 Mar 2025  -Current medications: Lantus 14 >> 16u at bedtime, aspart 15u w/dinner   Any problems/side effects after medication change: No   - BG values: Mixed fasting and postprandial 150s - 250s.   - Hypoglycemia sx: Denies headaches, lightheadedness, confusion, diaphoresis, tremor, palpitations. Has not had any low BG's < 70s   - Hyperglycemia sx: Denies fever/chills, polyuria, polydipsia, polyphagia, abdominal pain      Per 1/10/25 note:  Type 2 diabetes mellitus with diabetic autonomic neuropathy, with long-term current use of insulin (H)  A1c 10.2, not within goal < 8.5. Home fasting BG 140s - 200s, postprandials as high as 400s. Asymptomatic. Patient reports taking 2 tablets of metformin with lunch and then dinner. Ideally, would add on GLP1i and insulin, but patient was very hesitant about 2 injectables. Discussed risks/benefits of GLP1i - patient opted to just do metformin and insulin at this time. Would not be candidate for jardiance given urinary incontinence.       Review of Systems  Constitutional, HEENT, cardiovascular, pulmonary, gi and gu systems are negative, except as otherwise noted.      Objective    /80   Pulse 97   Resp 16   Ht 1.448 m (4' 9\")   Wt 52.6 kg (116 lb)   SpO2 95%   BMI 25.10 kg/m    Body mass index is 25.1 kg/m .  Physical Exam   GENERAL: Active, alert, in no acute distress.  LUNGS: Normal WOB, no respiratory distress  HEART: Appears well-perfused.  MSK: ROM of all 4 extremities grossly intact, no BLE edema on gross examination   SKIN: No obvious lesions on gross examination  NEUROLOGIC: Normal tone throughout. Normal reflexes for " age  PSYCH: Mood appropriate, normal affect              Signed Electronically by: Stacey Morgan MD

## 2025-04-15 ENCOUNTER — OFFICE VISIT (OUTPATIENT)
Dept: FAMILY MEDICINE | Facility: CLINIC | Age: 80
End: 2025-04-15
Payer: COMMERCIAL

## 2025-04-15 VITALS
HEART RATE: 97 BPM | RESPIRATION RATE: 16 BRPM | HEIGHT: 57 IN | BODY MASS INDEX: 25.03 KG/M2 | OXYGEN SATURATION: 95 % | DIASTOLIC BLOOD PRESSURE: 80 MMHG | SYSTOLIC BLOOD PRESSURE: 122 MMHG | WEIGHT: 116 LBS

## 2025-04-15 DIAGNOSIS — H04.123 DRY EYES: ICD-10-CM

## 2025-04-15 DIAGNOSIS — Z79.4 TYPE 2 DIABETES MELLITUS WITH DIABETIC AUTONOMIC NEUROPATHY, WITH LONG-TERM CURRENT USE OF INSULIN (H): Primary | ICD-10-CM

## 2025-04-15 DIAGNOSIS — E11.43 TYPE 2 DIABETES MELLITUS WITH DIABETIC AUTONOMIC NEUROPATHY, WITH LONG-TERM CURRENT USE OF INSULIN (H): Primary | ICD-10-CM

## 2025-04-15 RX ORDER — CARBOXYMETHYLCELLULOSE SODIUM 5 MG/ML
1 SOLUTION/ DROPS OPHTHALMIC DAILY PRN
Qty: 30 ML | Refills: 1 | Status: SHIPPED | OUTPATIENT
Start: 2025-04-15

## 2025-04-15 RX ORDER — ISOPROPYL ALCOHOL 700 MG/ML
1 CLOTH TOPICAL DAILY
Qty: 100 EACH | Refills: 1 | Status: SHIPPED | OUTPATIENT
Start: 2025-04-15

## 2025-04-15 RX ORDER — ATORVASTATIN CALCIUM 20 MG/1
20 TABLET, FILM COATED ORAL EVERY EVENING
Qty: 90 TABLET | Refills: 3 | Status: SHIPPED | OUTPATIENT
Start: 2025-04-15

## 2025-04-15 RX ORDER — METFORMIN HYDROCHLORIDE 500 MG/1
1000 TABLET, EXTENDED RELEASE ORAL 2 TIMES DAILY WITH MEALS
Qty: 360 TABLET | Refills: 3 | Status: SHIPPED | OUTPATIENT
Start: 2025-04-15

## 2025-04-15 NOTE — PATIENT INSTRUCTIONS
No changes to your medications today. Let's keep with the Lantus 16 units at bedtime and aspart 15 units with dinner.     We will recheck your A1c in 3 months and see if we need any medication changes then.

## 2025-05-22 ENCOUNTER — TELEPHONE (OUTPATIENT)
Dept: FAMILY MEDICINE | Facility: CLINIC | Age: 80
End: 2025-05-22
Payer: COMMERCIAL

## 2025-05-22 NOTE — TELEPHONE ENCOUNTER
Patient is on the schedule foe medicare wellness 6/3 w/ Dr. Medley. Form place up front   
Patient needs a AWV to complete forms. Please advise patient to schedule an appt. Sent message via fax to Center to inform patient.        Forms/Letter Request     Type of form/letter: OTHER:      Do we have the form/letter: Yes: Health Care Summary     Who is the form from? [facility]     Where did/will the form come from? form was faxed in     When is form/letter needed by: ASAP     How would you like the form/letter returned: Fax : [phone]     Patient Notified form requests are processed in 5-7 business days:No  
[employer] dose 1 and 2

## 2025-05-22 NOTE — TELEPHONE ENCOUNTER
Patient Quality Outreach    Patient is due for the following:   Physical Annual Wellness Visit    Action(s) Taken:   Patient was scheduled for \ Medicare wellness    Type of outreach:    Called and spoke to daughter and schedule     Questions for provider review:    None         Kristal Sanchez  Chart routed to None.

## 2025-06-03 ENCOUNTER — MEDICAL CORRESPONDENCE (OUTPATIENT)
Dept: HEALTH INFORMATION MANAGEMENT | Facility: CLINIC | Age: 80
End: 2025-06-03

## 2025-06-03 ENCOUNTER — OFFICE VISIT (OUTPATIENT)
Dept: FAMILY MEDICINE | Facility: CLINIC | Age: 80
End: 2025-06-03
Payer: COMMERCIAL

## 2025-06-03 DIAGNOSIS — Z00.00 WELLNESS EXAMINATION: Primary | ICD-10-CM

## 2025-06-03 PROCEDURE — 99207 PR NO BILLABLE SERVICE THIS VISIT: CPT

## 2025-06-03 SDOH — HEALTH STABILITY: PHYSICAL HEALTH: ON AVERAGE, HOW MANY DAYS PER WEEK DO YOU ENGAGE IN MODERATE TO STRENUOUS EXERCISE (LIKE A BRISK WALK)?: 3 DAYS

## 2025-06-03 ASSESSMENT — SOCIAL DETERMINANTS OF HEALTH (SDOH): HOW OFTEN DO YOU GET TOGETHER WITH FRIENDS OR RELATIVES?: NEVER

## 2025-06-03 ASSESSMENT — PATIENT HEALTH QUESTIONNAIRE - PHQ9
SUM OF ALL RESPONSES TO PHQ QUESTIONS 1-9: 24
SUM OF ALL RESPONSES TO PHQ QUESTIONS 1-9: 24
10. IF YOU CHECKED OFF ANY PROBLEMS, HOW DIFFICULT HAVE THESE PROBLEMS MADE IT FOR YOU TO DO YOUR WORK, TAKE CARE OF THINGS AT HOME, OR GET ALONG WITH OTHER PEOPLE: SOMEWHAT DIFFICULT

## (undated) DEVICE — SU DERMABOND ADVANCED .7ML DNX12

## (undated) DEVICE — GLOVE BIOGEL PI ULTRATOUCH G SZ 7.0 42170

## (undated) DEVICE — PADDING CAST 4IN WEBRIL STRL 2502

## (undated) DEVICE — SUTURE VICRYL+ 0 27IN CT-1 UND VCP260H

## (undated) DEVICE — SUTURE VICRYL+ 2-0 CT-2 27" UND VCP269H

## (undated) DEVICE — DRAPE IOBAN INCISE 23X17" 6650EZ

## (undated) DEVICE — DRSG KERLIX FLUFFS X5

## (undated) DEVICE — PREP CHLORAPREP 26ML TINTED HI-LITE ORANGE 930815

## (undated) DEVICE — DRAPE CONVERTORS U-DRAPE 60X72" 8476

## (undated) DEVICE — DRSG ISLAND 4X4" SQUARE LF MSC3244

## (undated) DEVICE — DRESSING MEPILEX ADH 4INX10IN STRL LF 496455

## (undated) DEVICE — Device

## (undated) DEVICE — DRAPE IOBAN ISOLATION VERTICAL 320X21CM 6617

## (undated) DEVICE — DRSG MEPILEX BORDER ADHS 10CMX20CM STRL 496405

## (undated) DEVICE — MAT FLOOR SURGICAL 40X38 0702140238

## (undated) DEVICE — SOL NACL 0.9% IRRIG 1000ML BOTTLE 2F7124

## (undated) DEVICE — SOL ISOPROPYL RUBBING ALCOHOL USP 70% 4OZ HDX-20 I0020

## (undated) DEVICE — DRAPE C-ARM 60X42" 1013

## (undated) DEVICE — SUCTION MANIFOLD NEPTUNE 2 SYS 1 PORT 702-025-000

## (undated) DEVICE — GLOVE UNDER INDICATOR PI SZ 7.0 LF 41670

## (undated) DEVICE — DRAPE SHEET REV FOLD 3/4 9349

## (undated) DEVICE — ESU GROUND PAD ADULT REM W/15' CORD E7507DB

## (undated) DEVICE — REAMER SHAFT MODIFIED TRINKLE 8X510MM 0227-8510S

## (undated) DEVICE — DRSG GAUZE 2X2" 8042

## (undated) DEVICE — CUSTOM PACK GEN MAJOR SBA5BGMHEA

## (undated) DEVICE — SUTURE MONOCRYL 3-0 18 PS2 UND MCP497G

## (undated) DEVICE — GLOVE BIOGEL PI INDICATOR 8.0 LF 41680

## (undated) DEVICE — DRAPE STERI U 1015

## (undated) DEVICE — PACK MINOR SINGLE BASIN SSK3001

## (undated) DEVICE — GUIDEWIRE BALL TIP 3.0X1000MM 1806-0085S

## (undated) DEVICE — GLOVE BIOGEL PI ULTRATOUCH G SZ 8.0 42180

## (undated) DEVICE — DRSG STERI STRIP 1/2X4" R1547

## (undated) DEVICE — PACKING IODOFORM STRIP 1/4" 7831

## (undated) DEVICE — SOL WATER IRRIG 1000ML BOTTLE 2F7114

## (undated) DEVICE — DRILL BIT STRK 4.2X103MM FULL THRD 1806-4280S

## (undated) DEVICE — DRAPE C-ARMOR 5 SIDED 5523

## (undated) DEVICE — KIT PATIENT CARE HANA TABLE PROFX SUPINE 6855

## (undated) DEVICE — ESU PENCIL SMOKE EVAC W/ROCKER SWITCH 0703-047-000

## (undated) RX ORDER — LIDOCAINE HYDROCHLORIDE 10 MG/ML
INJECTION, SOLUTION EPIDURAL; INFILTRATION; INTRACAUDAL; PERINEURAL
Status: DISPENSED
Start: 2023-12-27

## (undated) RX ORDER — DEXAMETHASONE SODIUM PHOSPHATE 10 MG/ML
INJECTION, SOLUTION INTRAMUSCULAR; INTRAVENOUS
Status: DISPENSED
Start: 2023-12-27

## (undated) RX ORDER — BUPIVACAINE HYDROCHLORIDE 5 MG/ML
INJECTION, SOLUTION EPIDURAL; INTRACAUDAL
Status: DISPENSED
Start: 2023-12-27

## (undated) RX ORDER — VANCOMYCIN HYDROCHLORIDE 1 G/20ML
INJECTION, POWDER, LYOPHILIZED, FOR SOLUTION INTRAVENOUS
Status: DISPENSED
Start: 2023-12-27

## (undated) RX ORDER — ONDANSETRON 2 MG/ML
INJECTION INTRAMUSCULAR; INTRAVENOUS
Status: DISPENSED
Start: 2023-12-27

## (undated) RX ORDER — PROPOFOL 10 MG/ML
INJECTION, EMULSION INTRAVENOUS
Status: DISPENSED
Start: 2023-12-27

## (undated) RX ORDER — FENTANYL CITRATE-0.9 % NACL/PF 10 MCG/ML
PLASTIC BAG, INJECTION (ML) INTRAVENOUS
Status: DISPENSED
Start: 2023-12-27

## (undated) RX ORDER — FENTANYL CITRATE 50 UG/ML
INJECTION, SOLUTION INTRAMUSCULAR; INTRAVENOUS
Status: DISPENSED
Start: 2023-12-27